# Patient Record
Sex: FEMALE | Race: WHITE | Employment: OTHER | URBAN - METROPOLITAN AREA
[De-identification: names, ages, dates, MRNs, and addresses within clinical notes are randomized per-mention and may not be internally consistent; named-entity substitution may affect disease eponyms.]

---

## 2019-03-06 ENCOUNTER — HOSPITAL ENCOUNTER (OUTPATIENT)
Age: 79
Discharge: HOME | End: 2019-03-08
Payer: MEDICARE

## 2019-03-06 ENCOUNTER — HOSPITAL ENCOUNTER (OUTPATIENT)
Dept: GENERAL RADIOLOGY | Age: 79
Discharge: HOME | End: 2019-03-08
Payer: MEDICARE

## 2019-03-06 DIAGNOSIS — I10 BENIGN HYPERTENSION: ICD-10-CM

## 2019-03-06 PROCEDURE — 71046 X-RAY EXAM CHEST 2 VIEWS: CPT

## 2019-06-12 ENCOUNTER — HOSPITAL ENCOUNTER (OUTPATIENT)
Dept: ULTRASOUND IMAGING | Age: 79
Discharge: HOME | End: 2019-06-12
Payer: MEDICARE

## 2019-06-12 ENCOUNTER — HOSPITAL ENCOUNTER (OUTPATIENT)
Age: 79
Discharge: HOME | End: 2019-06-14
Payer: MEDICARE

## 2019-06-12 ENCOUNTER — HOSPITAL ENCOUNTER (OUTPATIENT)
Dept: GENERAL RADIOLOGY | Age: 79
Discharge: HOME | End: 2019-06-14
Payer: MEDICARE

## 2019-06-12 DIAGNOSIS — R10.9 FLANK PAIN: ICD-10-CM

## 2019-06-12 DIAGNOSIS — R10.9 ABDOMINAL PAIN, UNSPECIFIED ABDOMINAL LOCATION: ICD-10-CM

## 2019-06-12 PROCEDURE — 74018 RADEX ABDOMEN 1 VIEW: CPT

## 2019-06-12 PROCEDURE — 76775 US EXAM ABDO BACK WALL LIM: CPT

## 2019-06-24 ENCOUNTER — HOSPITAL ENCOUNTER (OUTPATIENT)
Age: 79
Discharge: HOME | End: 2019-06-26

## 2019-06-24 LAB
ANION GAP SERPL CALCULATED.3IONS-SCNC: 14 MMOL/L (ref 7–16)
BUN BLDV-MCNC: 11 MG/DL (ref 8–23)
CALCIUM SERPL-MCNC: 9.4 MG/DL (ref 8.6–10.2)
CHLORIDE BLD-SCNC: 102 MMOL/L (ref 98–107)
CO2: 22 MMOL/L (ref 22–29)
CREAT SERPL-MCNC: 0.7 MG/DL (ref 0.5–1)
GFR AFRICAN AMERICAN: >60
GFR NON-AFRICAN AMERICAN: >60 ML/MIN/1.73
GLUCOSE BLD-MCNC: 94 MG/DL (ref 74–99)
HBA1C MFR BLD: 6.2 % (ref 4–5.6)
HCT VFR BLD CALC: 30.7 % (ref 34–48)
HEMOGLOBIN: 9.9 G/DL (ref 11.5–15.5)
MCH RBC QN AUTO: 30.7 PG (ref 26–35)
MCHC RBC AUTO-ENTMCNC: 32.2 % (ref 32–34.5)
MCV RBC AUTO: 95.3 FL (ref 80–99.9)
PDW BLD-RTO: 13.7 FL (ref 11.5–15)
PLATELET # BLD: 322 E9/L (ref 130–450)
PMV BLD AUTO: 9.9 FL (ref 7–12)
POTASSIUM SERPL-SCNC: 3.9 MMOL/L (ref 3.5–5)
RBC # BLD: 3.22 E12/L (ref 3.5–5.5)
SODIUM BLD-SCNC: 138 MMOL/L (ref 132–146)
WBC # BLD: 11.7 E9/L (ref 4.5–11.5)

## 2019-06-24 PROCEDURE — 85027 COMPLETE CBC AUTOMATED: CPT

## 2019-06-24 PROCEDURE — 36415 COLL VENOUS BLD VENIPUNCTURE: CPT

## 2019-06-24 PROCEDURE — 83036 HEMOGLOBIN GLYCOSYLATED A1C: CPT

## 2019-06-24 PROCEDURE — 80048 BASIC METABOLIC PNL TOTAL CA: CPT

## 2019-07-01 ENCOUNTER — CLINICAL DOCUMENTATION (OUTPATIENT)
Dept: FAMILY MEDICINE CLINIC | Age: 79
End: 2019-07-01

## 2019-07-01 ASSESSMENT — ENCOUNTER SYMPTOMS
BACK PAIN: 1
ABDOMINAL PAIN: 0
NAUSEA: 0
ALLERGIC/IMMUNOLOGIC NEGATIVE: 1
CONSTIPATION: 0
COUGH: 0
VOICE CHANGE: 0
STRIDOR: 0
COLOR CHANGE: 0
EYE ITCHING: 0
CHEST TIGHTNESS: 0
GASTROINTESTINAL NEGATIVE: 1
WHEEZING: 0
VOMITING: 0
PHOTOPHOBIA: 0
SHORTNESS OF BREATH: 0
SINUS PAIN: 0
RHINORRHEA: 0
BLOOD IN STOOL: 0
APNEA: 0
SORE THROAT: 0
DIARRHEA: 0
CHOKING: 0
ANAL BLEEDING: 0
ABDOMINAL DISTENTION: 0
EYE DISCHARGE: 0
FACIAL SWELLING: 0
RESPIRATORY NEGATIVE: 1
SINUS PRESSURE: 0
EYE REDNESS: 0
TROUBLE SWALLOWING: 0
EYE PAIN: 0
RECTAL PAIN: 0

## 2019-07-01 NOTE — PROGRESS NOTES
Lucy Vasquez is a 69 y.o. female. HPI/Chief C/O:  No chief complaint on file. Allergies   Allergen Reactions    Statins [Statins] Swelling and Rash     Muscle aches tongue swelled    Adhesive Tape      Causes reddness    Duricef [Cefadroxil]      Doesn't remember reaction    Gabapentin      Doesn't remember reaction    Oxaprozin      Possible rash    Sulfa Antibiotics Rash   I am in to admit this new patient  We will reconcile her past care planning with our treatment goals  She has no new issues today        ROS:  Review of Systems   Constitutional: Negative. Negative for activity change, appetite change, chills, diaphoresis, fatigue, fever and unexpected weight change. HENT: Negative. Negative for congestion, dental problem, drooling, ear discharge, ear pain, facial swelling, hearing loss, mouth sores, nosebleeds, postnasal drip, rhinorrhea, sinus pressure, sinus pain, sneezing, sore throat, tinnitus, trouble swallowing and voice change. Eyes: Negative for photophobia, pain, discharge, redness, itching and visual disturbance. Respiratory: Negative. Negative for apnea, cough, choking, chest tightness, shortness of breath, wheezing and stridor. Cardiovascular: Negative. Negative for chest pain, palpitations and leg swelling. Gastrointestinal: Negative. Negative for abdominal distention, abdominal pain, anal bleeding, blood in stool, constipation, diarrhea, nausea, rectal pain and vomiting. Endocrine: Negative. Negative for cold intolerance, heat intolerance, polydipsia, polyphagia and polyuria. Genitourinary: Negative. Negative for decreased urine volume, difficulty urinating, dysuria, enuresis, flank pain, frequency, genital sores, hematuria and urgency. Musculoskeletal: Positive for arthralgias, back pain, gait problem, joint swelling, myalgias and neck pain. Negative for neck stiffness. Skin: Negative. Negative for color change, pallor, rash and wound.

## 2019-07-02 ENCOUNTER — APPOINTMENT (OUTPATIENT)
Dept: GENERAL RADIOLOGY | Age: 79
DRG: 175 | End: 2019-07-02
Payer: MEDICARE

## 2019-07-02 ENCOUNTER — HOSPITAL ENCOUNTER (INPATIENT)
Age: 79
LOS: 4 days | Discharge: NURSING, REHAB OR LONG-TERM CARE | DRG: 175 | End: 2019-07-08
Attending: EMERGENCY MEDICINE | Admitting: INTERNAL MEDICINE
Payer: MEDICARE

## 2019-07-02 DIAGNOSIS — J18.9 HCAP (HEALTHCARE-ASSOCIATED PNEUMONIA): ICD-10-CM

## 2019-07-02 DIAGNOSIS — I26.99 OTHER ACUTE PULMONARY EMBOLISM WITHOUT ACUTE COR PULMONALE (HCC): ICD-10-CM

## 2019-07-02 DIAGNOSIS — R07.9 CHEST PAIN, UNSPECIFIED TYPE: Primary | ICD-10-CM

## 2019-07-02 PROBLEM — E78.5 HYPERLIPIDEMIA LDL GOAL <100: Chronic | Status: ACTIVE | Noted: 2019-07-02

## 2019-07-02 PROBLEM — I25.10 CAD (CORONARY ARTERY DISEASE), NATIVE CORONARY ARTERY: Chronic | Status: ACTIVE | Noted: 2019-07-02

## 2019-07-02 LAB
ALBUMIN SERPL-MCNC: 3.5 G/DL (ref 3.5–5.2)
ALP BLD-CCNC: 104 U/L (ref 35–104)
ALT SERPL-CCNC: 32 U/L (ref 0–32)
ANION GAP SERPL CALCULATED.3IONS-SCNC: 10 MMOL/L (ref 7–16)
AST SERPL-CCNC: 30 U/L (ref 0–31)
BASOPHILS ABSOLUTE: 0.03 E9/L (ref 0–0.2)
BASOPHILS RELATIVE PERCENT: 0.4 % (ref 0–2)
BILIRUB SERPL-MCNC: 0.5 MG/DL (ref 0–1.2)
BUN BLDV-MCNC: 9 MG/DL (ref 8–23)
CALCIUM SERPL-MCNC: 9.3 MG/DL (ref 8.6–10.2)
CHLORIDE BLD-SCNC: 104 MMOL/L (ref 98–107)
CO2: 26 MMOL/L (ref 22–29)
CREAT SERPL-MCNC: 0.7 MG/DL (ref 0.5–1)
EKG ATRIAL RATE: 95 BPM
EKG P AXIS: 72 DEGREES
EKG P-R INTERVAL: 226 MS
EKG Q-T INTERVAL: 564 MS
EKG QRS DURATION: 82 MS
EKG QTC CALCULATION (BAZETT): 708 MS
EKG R AXIS: 19 DEGREES
EKG T AXIS: 46 DEGREES
EKG VENTRICULAR RATE: 95 BPM
EOSINOPHILS ABSOLUTE: 0.11 E9/L (ref 0.05–0.5)
EOSINOPHILS RELATIVE PERCENT: 1.6 % (ref 0–6)
GFR AFRICAN AMERICAN: >60
GFR NON-AFRICAN AMERICAN: >60 ML/MIN/1.73
GLUCOSE BLD-MCNC: 115 MG/DL (ref 74–99)
HCT VFR BLD CALC: 34.6 % (ref 34–48)
HEMOGLOBIN: 10.9 G/DL (ref 11.5–15.5)
IMMATURE GRANULOCYTES #: 0.04 E9/L
IMMATURE GRANULOCYTES %: 0.6 % (ref 0–5)
LACTIC ACID: 1.1 MMOL/L (ref 0.5–2.2)
LYMPHOCYTES ABSOLUTE: 0.62 E9/L (ref 1.5–4)
LYMPHOCYTES RELATIVE PERCENT: 8.9 % (ref 20–42)
MCH RBC QN AUTO: 30.2 PG (ref 26–35)
MCHC RBC AUTO-ENTMCNC: 31.5 % (ref 32–34.5)
MCV RBC AUTO: 95.8 FL (ref 80–99.9)
MONOCYTES ABSOLUTE: 0.4 E9/L (ref 0.1–0.95)
MONOCYTES RELATIVE PERCENT: 5.7 % (ref 2–12)
NEUTROPHILS ABSOLUTE: 5.78 E9/L (ref 1.8–7.3)
NEUTROPHILS RELATIVE PERCENT: 82.8 % (ref 43–80)
PDW BLD-RTO: 13.5 FL (ref 11.5–15)
PLATELET # BLD: 405 E9/L (ref 130–450)
PMV BLD AUTO: 8.6 FL (ref 7–12)
POTASSIUM SERPL-SCNC: 4.6 MMOL/L (ref 3.5–5)
PRO-BNP: 1426 PG/ML (ref 0–450)
RBC # BLD: 3.61 E12/L (ref 3.5–5.5)
SODIUM BLD-SCNC: 140 MMOL/L (ref 132–146)
TOTAL PROTEIN: 6.8 G/DL (ref 6.4–8.3)
TROPONIN: <0.01 NG/ML (ref 0–0.03)
WBC # BLD: 7 E9/L (ref 4.5–11.5)

## 2019-07-02 PROCEDURE — 83880 ASSAY OF NATRIURETIC PEPTIDE: CPT

## 2019-07-02 PROCEDURE — 2580000003 HC RX 258: Performed by: PHYSICIAN ASSISTANT

## 2019-07-02 PROCEDURE — 84484 ASSAY OF TROPONIN QUANT: CPT

## 2019-07-02 PROCEDURE — 96366 THER/PROPH/DIAG IV INF ADDON: CPT

## 2019-07-02 PROCEDURE — G0378 HOSPITAL OBSERVATION PER HR: HCPCS

## 2019-07-02 PROCEDURE — 87040 BLOOD CULTURE FOR BACTERIA: CPT

## 2019-07-02 PROCEDURE — 6360000002 HC RX W HCPCS: Performed by: INTERNAL MEDICINE

## 2019-07-02 PROCEDURE — 80053 COMPREHEN METABOLIC PANEL: CPT

## 2019-07-02 PROCEDURE — 99285 EMERGENCY DEPT VISIT HI MDM: CPT

## 2019-07-02 PROCEDURE — 93010 ELECTROCARDIOGRAM REPORT: CPT | Performed by: INTERNAL MEDICINE

## 2019-07-02 PROCEDURE — 36415 COLL VENOUS BLD VENIPUNCTURE: CPT

## 2019-07-02 PROCEDURE — 85025 COMPLETE CBC W/AUTO DIFF WBC: CPT

## 2019-07-02 PROCEDURE — 93005 ELECTROCARDIOGRAM TRACING: CPT | Performed by: PHYSICIAN ASSISTANT

## 2019-07-02 PROCEDURE — 96372 THER/PROPH/DIAG INJ SC/IM: CPT

## 2019-07-02 PROCEDURE — 6360000002 HC RX W HCPCS: Performed by: PHYSICIAN ASSISTANT

## 2019-07-02 PROCEDURE — 71045 X-RAY EXAM CHEST 1 VIEW: CPT

## 2019-07-02 PROCEDURE — 2580000003 HC RX 258: Performed by: INTERNAL MEDICINE

## 2019-07-02 PROCEDURE — 96365 THER/PROPH/DIAG IV INF INIT: CPT

## 2019-07-02 PROCEDURE — 83605 ASSAY OF LACTIC ACID: CPT

## 2019-07-02 PROCEDURE — 6370000000 HC RX 637 (ALT 250 FOR IP): Performed by: INTERNAL MEDICINE

## 2019-07-02 RX ORDER — ASPIRIN 81 MG/1
81 TABLET, CHEWABLE ORAL DAILY
Status: DISCONTINUED | OUTPATIENT
Start: 2019-07-03 | End: 2019-07-02 | Stop reason: SDUPTHER

## 2019-07-02 RX ORDER — CETIRIZINE HYDROCHLORIDE 10 MG/1
5 TABLET ORAL DAILY
Status: DISCONTINUED | OUTPATIENT
Start: 2019-07-02 | End: 2019-07-08 | Stop reason: HOSPADM

## 2019-07-02 RX ORDER — SENNA PLUS 8.6 MG/1
2 TABLET ORAL DAILY
Status: DISCONTINUED | OUTPATIENT
Start: 2019-07-02 | End: 2019-07-08 | Stop reason: HOSPADM

## 2019-07-02 RX ORDER — SODIUM CHLORIDE 0.9 % (FLUSH) 0.9 %
10 SYRINGE (ML) INJECTION PRN
Status: DISCONTINUED | OUTPATIENT
Start: 2019-07-02 | End: 2019-07-08 | Stop reason: HOSPADM

## 2019-07-02 RX ORDER — PANTOPRAZOLE SODIUM 40 MG/1
40 TABLET, DELAYED RELEASE ORAL
Status: DISCONTINUED | OUTPATIENT
Start: 2019-07-03 | End: 2019-07-08 | Stop reason: HOSPADM

## 2019-07-02 RX ORDER — POLYETHYLENE GLYCOL 3350 17 G/17G
17 POWDER, FOR SOLUTION ORAL DAILY
Status: DISCONTINUED | OUTPATIENT
Start: 2019-07-02 | End: 2019-07-08 | Stop reason: HOSPADM

## 2019-07-02 RX ORDER — ASPIRIN 81 MG/1
81 TABLET, CHEWABLE ORAL DAILY
Status: DISCONTINUED | OUTPATIENT
Start: 2019-07-02 | End: 2019-07-08 | Stop reason: HOSPADM

## 2019-07-02 RX ORDER — GUAIFENESIN AND DEXTROMETHORPHAN HYDROBROMIDE 100; 10 MG/5ML; MG/5ML
10 SOLUTION ORAL EVERY 4 HOURS PRN
COMMUNITY
End: 2021-05-27

## 2019-07-02 RX ORDER — CETIRIZINE HYDROCHLORIDE 10 MG/1
5 TABLET ORAL DAILY
Status: DISCONTINUED | OUTPATIENT
Start: 2019-07-03 | End: 2019-07-02

## 2019-07-02 RX ORDER — ACETAMINOPHEN 325 MG/1
650 TABLET ORAL EVERY 4 HOURS PRN
COMMUNITY
End: 2020-09-22

## 2019-07-02 RX ORDER — SENNA PLUS 8.6 MG/1
2 TABLET ORAL DAILY
COMMUNITY
End: 2022-01-03

## 2019-07-02 RX ORDER — OXYCODONE HYDROCHLORIDE AND ACETAMINOPHEN 5; 325 MG/1; MG/1
1 TABLET ORAL EVERY 6 HOURS PRN
Status: ON HOLD | COMMUNITY
End: 2019-07-08 | Stop reason: SDUPTHER

## 2019-07-02 RX ORDER — NITROGLYCERIN 0.4 MG/1
0.4 TABLET SUBLINGUAL EVERY 5 MIN PRN
COMMUNITY

## 2019-07-02 RX ORDER — BISACODYL 10 MG
10 SUPPOSITORY, RECTAL RECTAL DAILY PRN
COMMUNITY
End: 2020-09-22

## 2019-07-02 RX ORDER — OXYCODONE HYDROCHLORIDE AND ACETAMINOPHEN 5; 325 MG/1; MG/1
1 TABLET ORAL EVERY 6 HOURS PRN
Status: DISCONTINUED | OUTPATIENT
Start: 2019-07-02 | End: 2019-07-08 | Stop reason: HOSPADM

## 2019-07-02 RX ORDER — ONDANSETRON 2 MG/ML
4 INJECTION INTRAMUSCULAR; INTRAVENOUS EVERY 6 HOURS PRN
Status: DISCONTINUED | OUTPATIENT
Start: 2019-07-02 | End: 2019-07-03

## 2019-07-02 RX ORDER — ACETAMINOPHEN 325 MG/1
650 TABLET ORAL EVERY 4 HOURS PRN
Status: ON HOLD | COMMUNITY
End: 2019-07-08 | Stop reason: HOSPADM

## 2019-07-02 RX ORDER — SODIUM CHLORIDE 0.9 % (FLUSH) 0.9 %
10 SYRINGE (ML) INJECTION EVERY 12 HOURS SCHEDULED
Status: DISCONTINUED | OUTPATIENT
Start: 2019-07-02 | End: 2019-07-08 | Stop reason: HOSPADM

## 2019-07-02 RX ORDER — POLYETHYLENE GLYCOL 3350 17 G/17G
17 POWDER, FOR SOLUTION ORAL DAILY
COMMUNITY
End: 2020-09-22

## 2019-07-02 RX ORDER — ACETAMINOPHEN 325 MG/1
650 TABLET ORAL EVERY 4 HOURS PRN
Status: DISCONTINUED | OUTPATIENT
Start: 2019-07-02 | End: 2019-07-08 | Stop reason: HOSPADM

## 2019-07-02 RX ADMIN — PIPERACILLIN SODIUM,TAZOBACTAM SODIUM 3.38 G: 3; .375 INJECTION, POWDER, FOR SOLUTION INTRAVENOUS at 13:15

## 2019-07-02 RX ADMIN — Medication 10 ML: at 21:51

## 2019-07-02 RX ADMIN — METOPROLOL TARTRATE 25 MG: 25 TABLET, FILM COATED ORAL at 16:07

## 2019-07-02 RX ADMIN — ENOXAPARIN SODIUM 40 MG: 40 INJECTION SUBCUTANEOUS at 16:07

## 2019-07-02 RX ADMIN — ASPIRIN 81 MG 81 MG: 81 TABLET ORAL at 16:07

## 2019-07-02 RX ADMIN — METOPROLOL TARTRATE 25 MG: 25 TABLET, FILM COATED ORAL at 21:40

## 2019-07-02 ASSESSMENT — PAIN SCALES - GENERAL: PAINLEVEL_OUTOF10: 0

## 2019-07-02 ASSESSMENT — PAIN - FUNCTIONAL ASSESSMENT: PAIN_FUNCTIONAL_ASSESSMENT: 0-10

## 2019-07-02 NOTE — ED PROVIDER NOTES
ED Attending  CC: Carrie       Department of Emergency Medicine   ED  Provider Note  Admit Date/RoomTime: 7/2/2019  8:07 AM  ED Room: 14B/14B-14  MRN: [id number]  Chief Complaint:   Chest Pain and Shortness of Breath       History of Present Illness   Source of history provided by:  patient. History/Exam Limitations: none. Cathie Zambrano is a 69 y.o. female who has a past medical history of:   Past Medical History:   Diagnosis Date    Arthritis     shoulders, hips and knees, fingers    Bronchitis     CAD (coronary artery disease)     some blockage, being treated with aspirin    Diabetes mellitus ([facility] [address]     borderlline    GERD (gastroesophageal reflux disease)     Hyperlipidemia     Hypertension     Sinus disorder     presents to the ED by ambulance for chest pain and shortness of breath, beginning at 0300 and are unchanged since that time. The complaint has been persistent, moderate in severity. States she believes she had an allergic reaction. Denies any new foods, fragrances, lotions or detergents. Did take robitussin for sinus issues. States she believes she has taken before without reaction. At that time she developed chest pressure and shortness of breath. Patient is 2 weeks post quadruple bypass by Dr. Ledy Landrum at [place]. States she has worn oxygen intermittently since her surgery. Denies any fever, chills, nausea, vomiting, tongue or lip swelling, difficulty swallowing. Given benadryl pta with improvement. ROS   Pertinent positives and negatives are stated within HPI, all other systems reviewed and are negative. Past Surgical History:   Procedure Laterality Date    APPENDECTOMY      BLADDER SUSPENSION      CARDIAC SURGERY      CHOLECYSTECTOMY      COLONOSCOPY  11/29/12    diverticulosis, hemorrhoid    COLONOSCOPY  september 2015    CYST REMOVAL      back    DILATION AND CURETTAGE OF UTERUS      HYSTERECTOMY     Social History:  reports that she has quit smoking.  She has never 32.0 - 34.5 %    RDW 13.5 11.5 - 15.0 fL    Platelets 047 939 - 567 E9/L    MPV 8.6 7.0 - 12.0 fL    Neutrophils % 82.8 (H) 43.0 - 80.0 %    Immature Granulocytes % 0.6 0.0 - 5.0 %    Lymphocytes % 8.9 (L) 20.0 - 42.0 %    Monocytes % 5.7 2.0 - 12.0 %    Eosinophils % 1.6 0.0 - 6.0 %    Basophils % 0.4 0.0 - 2.0 %    Neutrophils # 5.78 1.80 - 7.30 E9/L    Immature Granulocytes # 0.04 E9/L    Lymphocytes # 0.62 (L) 1.50 - 4.00 E9/L    Monocytes # 0.40 0.10 - 0.95 E9/L    Eosinophils # 0.11 0.05 - 0.50 E9/L    Basophils # 0.03 0.00 - 0.20 E9/L   Comprehensive Metabolic Panel   Result Value Ref Range    Sodium 140 132 - 146 mmol/L    Potassium 4.6 3.5 - 5.0 mmol/L    Chloride 104 98 - 107 mmol/L    CO2 26 22 - 29 mmol/L    Anion Gap 10 7 - 16 mmol/L    Glucose 115 (H) 74 - 99 mg/dL    BUN 9 8 - 23 mg/dL    CREATININE 0.7 0.5 - 1.0 mg/dL    GFR Non-African American >60 >=60 mL/min/1.73    GFR African American >60     Calcium 9.3 8.6 - 10.2 mg/dL    Total Protein 6.8 6.4 - 8.3 g/dL    Alb 3.5 3.5 - 5.2 g/dL    Total Bilirubin 0.5 0.0 - 1.2 mg/dL    Alkaline Phosphatase 104 35 - 104 U/L    ALT 32 0 - 32 U/L    AST 30 0 - 31 U/L   Troponin   Result Value Ref Range    Troponin <0.01 0.00 - 0.03 ng/mL   Brain Natriuretic Peptide   Result Value Ref Range    Pro-BNP 1,426 (H) 0 - 450 pg/mL   Lactic Acid, Plasma   Result Value Ref Range    Lactic Acid 1.1 0.5 - 2.2 mmol/L   EKG 12 Lead   Result Value Ref Range    Ventricular Rate 95 BPM    Atrial Rate 95 BPM    P-R Interval 226 ms    QRS Duration 82 ms    Q-T Interval 564 ms    QTc Calculation (Bazett) 708 ms    P Axis 72 degrees    R Axis 19 degrees    T Axis 46 degrees     Imaging: All Radiology results interpreted by Radiologist unless otherwise noted. XR CHEST PORTABLE   Final Result   1. Stable, enlarged cardiomediastinal silhouette with thoracic aortic   vascular calcifications.    2. Bibasilar airspace disease, nonspecific finding, there is since   previous exam, left

## 2019-07-02 NOTE — PROGRESS NOTES
Call received from [facility] in [facility], patient stress test tomorrow  (7/3/19) at 1400 with Dr. Afsaneh Sorensen

## 2019-07-03 PROBLEM — E11.9 DIABETES MELLITUS (HCC): Status: ACTIVE | Noted: 2019-07-03

## 2019-07-03 PROBLEM — I10 HYPERTENSION: Status: ACTIVE | Noted: 2019-07-03

## 2019-07-03 PROBLEM — K21.9 GERD (GASTROESOPHAGEAL REFLUX DISEASE): Status: ACTIVE | Noted: 2019-07-03

## 2019-07-03 LAB
C-REACTIVE PROTEIN: 4.1 MG/DL (ref 0–0.4)
CHOLESTEROL, TOTAL: 155 MG/DL (ref 0–199)
D DIMER: 4229 NG/ML DDU
HDLC SERPL-MCNC: 26 MG/DL
LDL CHOLESTEROL CALCULATED: 102 MG/DL (ref 0–99)
LV EF: 67 %
LVEF MODALITY: NORMAL
SEDIMENTATION RATE, ERYTHROCYTE: 55 MM/HR (ref 0–20)
TRIGL SERPL-MCNC: 137 MG/DL (ref 0–149)
VLDLC SERPL CALC-MCNC: 27 MG/DL

## 2019-07-03 PROCEDURE — 97161 PT EVAL LOW COMPLEX 20 MIN: CPT

## 2019-07-03 PROCEDURE — G0378 HOSPITAL OBSERVATION PER HR: HCPCS

## 2019-07-03 PROCEDURE — 80061 LIPID PANEL: CPT

## 2019-07-03 PROCEDURE — 2580000003 HC RX 258: Performed by: INTERNAL MEDICINE

## 2019-07-03 PROCEDURE — 96372 THER/PROPH/DIAG INJ SC/IM: CPT

## 2019-07-03 PROCEDURE — 85378 FIBRIN DEGRADE SEMIQUANT: CPT

## 2019-07-03 PROCEDURE — 6370000000 HC RX 637 (ALT 250 FOR IP): Performed by: INTERNAL MEDICINE

## 2019-07-03 PROCEDURE — 6360000002 HC RX W HCPCS: Performed by: INTERNAL MEDICINE

## 2019-07-03 PROCEDURE — 36415 COLL VENOUS BLD VENIPUNCTURE: CPT

## 2019-07-03 PROCEDURE — 96366 THER/PROPH/DIAG IV INF ADDON: CPT

## 2019-07-03 PROCEDURE — 93306 TTE W/DOPPLER COMPLETE: CPT

## 2019-07-03 PROCEDURE — 96367 TX/PROPH/DG ADDL SEQ IV INF: CPT

## 2019-07-03 PROCEDURE — 87040 BLOOD CULTURE FOR BACTERIA: CPT

## 2019-07-03 PROCEDURE — 85651 RBC SED RATE NONAUTOMATED: CPT

## 2019-07-03 PROCEDURE — 97530 THERAPEUTIC ACTIVITIES: CPT

## 2019-07-03 PROCEDURE — 97165 OT EVAL LOW COMPLEX 30 MIN: CPT

## 2019-07-03 PROCEDURE — 97535 SELF CARE MNGMENT TRAINING: CPT

## 2019-07-03 PROCEDURE — 86140 C-REACTIVE PROTEIN: CPT

## 2019-07-03 RX ORDER — COLCHICINE 0.6 MG/1
0.6 TABLET ORAL 2 TIMES DAILY
Status: DISCONTINUED | OUTPATIENT
Start: 2019-07-03 | End: 2019-07-08 | Stop reason: HOSPADM

## 2019-07-03 RX ORDER — GUAIFENESIN 400 MG/1
400 TABLET ORAL 3 TIMES DAILY
Status: DISCONTINUED | OUTPATIENT
Start: 2019-07-03 | End: 2019-07-08 | Stop reason: HOSPADM

## 2019-07-03 RX ADMIN — METOPROLOL TARTRATE 25 MG: 25 TABLET, FILM COATED ORAL at 22:00

## 2019-07-03 RX ADMIN — METOPROLOL TARTRATE 25 MG: 25 TABLET, FILM COATED ORAL at 08:08

## 2019-07-03 RX ADMIN — VANCOMYCIN HYDROCHLORIDE 1.5 G: 10 INJECTION, POWDER, LYOPHILIZED, FOR SOLUTION INTRAVENOUS at 22:00

## 2019-07-03 RX ADMIN — SENNOSIDES 17.2 MG: 8.6 TABLET, FILM COATED ORAL at 08:08

## 2019-07-03 RX ADMIN — POLYETHYLENE GLYCOL 3350 17 G: 17 POWDER, FOR SOLUTION ORAL at 08:08

## 2019-07-03 RX ADMIN — ASPIRIN 81 MG 81 MG: 81 TABLET ORAL at 08:08

## 2019-07-03 RX ADMIN — Medication 10 ML: at 22:00

## 2019-07-03 RX ADMIN — Medication 10 ML: at 08:09

## 2019-07-03 RX ADMIN — COLCHICINE 0.6 MG: 0.6 TABLET, FILM COATED ORAL at 22:00

## 2019-07-03 RX ADMIN — GUAIFENESIN 400 MG: 400 TABLET ORAL at 22:00

## 2019-07-03 RX ADMIN — CETIRIZINE HYDROCHLORIDE 5 MG: 10 TABLET, FILM COATED ORAL at 08:08

## 2019-07-03 RX ADMIN — ENOXAPARIN SODIUM 40 MG: 40 INJECTION SUBCUTANEOUS at 08:08

## 2019-07-03 ASSESSMENT — PAIN SCALES - GENERAL
PAINLEVEL_OUTOF10: 0

## 2019-07-03 NOTE — H&P
[address] [facility]  History and Physical      CHIEF COMPLAINT: Pain      HISTORY OF PRESENT ILLNESS:      The patient is a 69 y.o. female patient of dr wilson who presents with chest pain. History of CAD diabetes GERD with chest pain and shortness of breath. Pain started at 3:00 yesterday and lasted for minutes associated with shortness of breath. Occurred at rest.  Associated with diaphoresis. He also notes a reddish splotchy rash at the time. Denies exacerbation relief. She had CABG 2 weeks ago. She denies any new medications.     Past Medical History:    Past Medical History:   Diagnosis Date    Arthritis     shoulders, hips and knees, fingers    Bronchitis     CAD (coronary artery disease)     some blockage, being treated with aspirin    Diabetes mellitus (HCC)     borderlline    GERD (gastroesophageal reflux disease)     Hyperlipidemia     Hypertension     Sinus disorder        Past Surgical History:    Past Surgical History:   Procedure Laterality Date    APPENDECTOMY      BLADDER SUSPENSION      CARDIAC SURGERY      CHOLECYSTECTOMY      COLONOSCOPY  11/29/12    diverticulosis, hemorrhoid    COLONOSCOPY  september 2015    CYST REMOVAL      back    DILATION AND CURETTAGE OF UTERUS      HYSTERECTOMY         Medications Prior to Admission:    Medications Prior to Admission: acetaminophen (TYLENOL) 325 MG tablet, Take 650 mg by mouth every 4 hours as needed for Pain  acetaminophen (TYLENOL) 325 MG tablet, Take 650 mg by mouth every 4 hours as needed for Pain or Fever  bisacodyl (DULCOLAX) 10 MG suppository, Place 10 mg rectally daily as needed for Constipation  polyethylene glycol (GLYCOLAX) powder, Take 17 g by mouth daily  senna (SENOKOT) 8.6 MG tablet, Take 2 tablets by mouth daily  magnesium hydroxide (MILK OF MAGNESIA) 400 MG/5ML suspension, Take 30 mLs by mouth daily as needed for Constipation  nitroGLYCERIN (NITROSTAT) 0.4 MG SL tablet, Place 0.4 mg under the tongue every 5 minutes as needed for Chest pain up to max of 3 total doses. If no relief after 1 dose, call 911. oxyCODONE-acetaminophen (PERCOCET) 5-325 MG per tablet, Take 1 tablet by mouth every 6 hours as needed for Pain. Dextromethorphan-guaiFENesin  MG/5ML SYRP, Take 10 mLs by mouth every 4 hours as needed for Cough  metoprolol tartrate (LOPRESSOR) 25 MG tablet, Take 25 mg by mouth 2 times daily  aspirin 81 MG tablet, Take 81 mg by mouth daily Last dose 9/4 2015  omeprazole (PRILOSEC) 10 MG capsule, Take 20 mg by mouth daily   [DISCONTINUED] metoprolol (TOPROL-XL) 25 MG XL tablet, Take 25 mg by mouth 2 times daily     Allergies:    Statins [statins]; Adhesive tape; Duricef [cefadroxil]; Gabapentin; Oxaprozin; and Sulfa antibiotics    Social History:    reports that she has quit smoking. She has never used smokeless tobacco. She reports that she does not drink alcohol or use drugs. Family History:   pt denies contributory history     REVIEW OF SYSTEMS:  As above in the HPI, otherwise negative    PHYSICAL EXAM:    Vitals:  BP (!) 123/58   Pulse 81   Temp 98.5 °F (36.9 °C) (Temporal)   Resp 18   Ht 5' 3\" (1.6 m)   Wt 220 lb (99.8 kg)   SpO2 95%   BMI 38.97 kg/m²     General:  Awake, alert, oriented X 3. Well developed, well nourished, well groomed. No apparent distress. HEENT:  Normocephalic, atraumatic. Pupils equal, round, reactive to light. No scleral icterus. No conjunctival injection. Normal lips, teeth, and gums. No nasal discharge. Neck:  Supple  Heart:  RRR, no murmurs, gallops, rubs  Lungs:  CTA bilaterally, bilat symmetrical expansion, no wheeze, rales, or rhonchi  Abdomen:   Bowel sounds present, soft, nontender, no masses, no organomegaly, no peritoneal signs  Extremities:  No clubbing, cyanosis, or edema  Skin:  Warm and dry, no open lesions or rash  Neuro:  Cranial nerves 2-12 intact, no focal deficits  Breast: deferred  Rectal: deferred  Genitalia:  deferred    LABS:    CBC with

## 2019-07-03 NOTE — PROGRESS NOTES
Visual/  Perceptual Glasses: yes                    Hand dominance: right      Strength ROM Additional Info:    RUE  Wfl, formal MMT deferred due to sternal precautions  wfl (within sternal precautions) good  and wfl FMC/dexterity noted during ADL tasks     LUE Wfl, formal MMT deferred due to sternal precautions  wfl (within sternal precautions) good  and wfl FMC/dexterity noted during ADL tasks     Sensation:wfl  Tone: wfl  Edema:none noted                              Comments/Treatment: Upon arrival, patient supine in bed and agreeable to OT Session with PT collaboration. Therapist educated pt on role of OT and sternal precautions. Therapist facilitated bed mobility (log roll technique), functional transfers (various surfaces; bed, chair and toilet), standing tolerance tasks (addressing posture, balance and activity tolerance) and functional ambulation task in room and hallway (with and without w/w: cuing on sternal precautions within w/w management; light steadying assist allowed) - skilled cuing on sternal precautions, use of sternal pillow, body mechanics and safety. Therapist facilitated self-care retraining: education regarding UB/LB self-care tasks (modified techniques and limitations with precautions), toileting task and standing grooming task at sink educating pt on modified techniques, posture, safety and energy conservation techniques. Skilled monitoring of HR, O2 sats and pts response to treatment - cuing on pursed lip breathing. Pt demonstrating fair understanding of education/techniques, requiring additional training / education. At end of session, patient seated in chair with call light and phone within reach. Pt would benefit from continued skilled OT to increase functional independence and quality of life.     Eval Complexity: Low    (Evaluation time includes thorough review of current medical information, gathering information on past medical history/social history and prior level of function, completion of standardized testing/informal observation of tasks, assessment of data, and development of POC/Goals.)      Assessment of current deficits   Functional mobility [x]  ADLs [x] Strength [x]  Cognition []  Functional transfers  [x] IADLs [x] Safety Awareness [x]  Endurance [x]  Fine Motor Coordination [] Balance [x] Vision/perception [] Sensation []   Gross Motor Coordination [] ROM [] Delirium []                  Motor Control []    Plan of Care:   ADL retraining [x]   Equipment needs [x]   Neuromuscular re-education [x] Energy Conservation Techniques [x]  Functional Transfer training [x] Patient and/or Family Education [x]  Functional Mobility training [x]  Environmental Modifications [x]  Cognitive re-training []   Compensatory techniques for ADLs [x]  Splinting Needs []   Positioning to improve overall function [x]   Therapeutic Activity [x]  Therapeutic Exercise  [x]  Visual/Perceptual: []    Delirium prevention/treatment  []   Other:  []    Rehab Potential: Good for established goals     Patient / Family Goal:  Not stated     Patient and/or family were instructed diagnosis, prognosis/goals and plan of care. Demonstrated fair understanding. [] Malnutrition indicators have been identified and nursing has been notified to ensure a dietitian consult is ordered.        AM-PAC Daily Activity Inpatient   How much help for putting on and taking off regular lower body clothing?: A Lot  How much help for Bathing?: A Lot  How much help for Toileting?: A Little  How much help for putting on and taking off regular upper body clothing?: A Little  How much help for taking care of personal grooming?: A Little  How much help for eating meals?: None  AM-[facility] Inpatient Daily Activity Raw Score: 17  AM-PAC Inpatient ADL T-Scale Score : 37.26  ADL Inpatient CMS 0-100% Score: 50.11  ADL Inpatient CMS G-Code Modifier : CK       low Evaluation + 12 timed treatment minutes  Tx Time in: 1108  Tx Time out: [address] OTR/L #[id number]

## 2019-07-03 NOTE — CONSULTS
CARDIOLOGY CONSULTATION    Patient Name:  Eden Neves    :  1940    Reason for Consultation:   Chest discomfort; shortness of breath    History of Present Illness:   Eden Neves presents to [facility] following the sudden onset of pressure-like discomfort in the right chest as well as dyspnea yesterday shortly prior to and following her taking a shower. Additionally she noted profound erythematous change in the color of her skin on her body. The past she has had rashes associated with statin drugs and does not take any presently secondary to either rash or severe myalgia. Her discomfort is not specifically related to exertion she does feel dyspneic after she exerts. He has no hemoptysis. She notes she felt similar to this she has her sinuses \"acting up\". Mucinex seems to help this. Past Medical History:   has a past medical history of Arthritis, Bronchitis, CAD (coronary artery disease), Diabetes mellitus ([facility] [address] GERD (gastroesophageal reflux disease), Hyperlipidemia, Hypertension, and Sinus disorder. Surgical History:   has a past surgical history that includes Hysterectomy; Appendectomy; Dilation and curettage of uterus; bladder suspension; Cholecystectomy; Colonoscopy (12); cyst removal; Colonoscopy (2015); and Cardiac surgery. Social History:   reports that she has quit smoking. She has never used smokeless tobacco. She reports that she does not drink alcohol or use drugs. Family History:  family history is remarkable for mother  age 54 carcinoma of the lung; father  age 61 myocardial infarction; brother  age 67 heart disease. Medications:  Prior to Admission medications    Medication Sig Start Date End Date Taking?  Authorizing Provider   acetaminophen (TYLENOL) 325 MG tablet Take 650 mg by mouth every 4 hours as needed for Pain   Yes Historical Provider, MD   acetaminophen (TYLENOL) 325 MG tablet Take 650 mg by mouth every 4 hours as needed for Pain or Fever   Yes Historical Provider, MD   bisacodyl (DULCOLAX) 10 MG suppository Place 10 mg rectally daily as needed for Constipation   Yes Historical Provider, MD   polyethylene glycol (GLYCOLAX) powder Take 17 g by mouth daily   Yes Historical Provider, MD   senna (SENOKOT) 8.6 MG tablet Take 2 tablets by mouth daily   Yes Historical Provider, MD   magnesium hydroxide (MILK OF MAGNESIA) 400 MG/5ML suspension Take 30 mLs by mouth daily as needed for Constipation   Yes Historical Provider, MD   nitroGLYCERIN (NITROSTAT) 0.4 MG SL tablet Place 0.4 mg under the tongue every 5 minutes as needed for Chest pain up to max of 3 total doses. If no relief after 1 dose, call 911. Yes Historical Provider, MD   oxyCODONE-acetaminophen (PERCOCET) 5-325 MG per tablet Take 1 tablet by mouth every 6 hours as needed for Pain. Yes Historical Provider, MD   Dextromethorphan-guaiFENesin  MG/5ML SYRP Take 10 mLs by mouth every 4 hours as needed for Cough   Yes Historical Provider, MD   metoprolol tartrate (LOPRESSOR) 25 MG tablet Take 25 mg by mouth 2 times daily   Yes Historical Provider, MD   aspirin 81 MG tablet Take 81 mg by mouth daily Last dose 9/4 2015   Yes Historical Provider, MD   omeprazole (PRILOSEC) 10 MG capsule Take 20 mg by mouth daily    Yes Historical Provider, MD       Allergies:  Statins [statins]; Adhesive tape; Duricef [cefadroxil]; Gabapentin; Oxaprozin; and Sulfa antibiotics     Review of Systems:   · Constitutional: there has been no unanticipated weight loss. There's been no significant change in energy level, sleep pattern or activity level. No fever chills or rigors. · Eyes: No visual changes or diplopia. No scleral icterus. · ENT: No Headaches, hearing loss or vertigo. No mouth sores or sore throat. No change in taste or smell.   · Cardiovascular: + Right-sided chest discomfort, dyspnea on exertion, denies palpitations, loss of consciousness, no

## 2019-07-04 ENCOUNTER — APPOINTMENT (OUTPATIENT)
Dept: CT IMAGING | Age: 79
DRG: 175 | End: 2019-07-04
Payer: MEDICARE

## 2019-07-04 PROBLEM — I26.99 PULMONARY EMBOLISM (HCC): Status: ACTIVE | Noted: 2019-07-04

## 2019-07-04 PROBLEM — I50.31 DIASTOLIC CHF, ACUTE (HCC): Status: ACTIVE | Noted: 2019-07-04

## 2019-07-04 PROCEDURE — 2700000000 HC OXYGEN THERAPY PER DAY

## 2019-07-04 PROCEDURE — 6370000000 HC RX 637 (ALT 250 FOR IP): Performed by: INTERNAL MEDICINE

## 2019-07-04 PROCEDURE — 6360000004 HC RX CONTRAST MEDICATION: Performed by: RADIOLOGY

## 2019-07-04 PROCEDURE — 6360000002 HC RX W HCPCS: Performed by: INTERNAL MEDICINE

## 2019-07-04 PROCEDURE — 2140000000 HC CCU INTERMEDIATE R&B

## 2019-07-04 PROCEDURE — 2580000003 HC RX 258: Performed by: RADIOLOGY

## 2019-07-04 PROCEDURE — 71275 CT ANGIOGRAPHY CHEST: CPT

## 2019-07-04 PROCEDURE — 2580000003 HC RX 258: Performed by: INTERNAL MEDICINE

## 2019-07-04 RX ORDER — SODIUM CHLORIDE 0.9 % (FLUSH) 0.9 %
10 SYRINGE (ML) INJECTION ONCE
Status: COMPLETED | OUTPATIENT
Start: 2019-07-04 | End: 2019-07-04

## 2019-07-04 RX ORDER — FUROSEMIDE 10 MG/ML
20 INJECTION INTRAMUSCULAR; INTRAVENOUS 2 TIMES DAILY
Status: DISCONTINUED | OUTPATIENT
Start: 2019-07-04 | End: 2019-07-08 | Stop reason: HOSPADM

## 2019-07-04 RX ADMIN — CETIRIZINE HYDROCHLORIDE 5 MG: 10 TABLET, FILM COATED ORAL at 09:06

## 2019-07-04 RX ADMIN — FUROSEMIDE 20 MG: 10 INJECTION, SOLUTION INTRAMUSCULAR; INTRAVENOUS at 12:36

## 2019-07-04 RX ADMIN — APIXABAN 10 MG: 5 TABLET, FILM COATED ORAL at 19:52

## 2019-07-04 RX ADMIN — IOPAMIDOL 75 ML: 755 INJECTION, SOLUTION INTRAVENOUS at 12:03

## 2019-07-04 RX ADMIN — ENOXAPARIN SODIUM 40 MG: 40 INJECTION SUBCUTANEOUS at 09:08

## 2019-07-04 RX ADMIN — METOPROLOL TARTRATE 25 MG: 25 TABLET, FILM COATED ORAL at 09:02

## 2019-07-04 RX ADMIN — FUROSEMIDE 20 MG: 10 INJECTION, SOLUTION INTRAMUSCULAR; INTRAVENOUS at 17:50

## 2019-07-04 RX ADMIN — COLCHICINE 0.6 MG: 0.6 TABLET, FILM COATED ORAL at 09:04

## 2019-07-04 RX ADMIN — GUAIFENESIN 400 MG: 400 TABLET ORAL at 23:19

## 2019-07-04 RX ADMIN — Medication 10 ML: at 12:04

## 2019-07-04 RX ADMIN — COLCHICINE 0.6 MG: 0.6 TABLET, FILM COATED ORAL at 23:18

## 2019-07-04 RX ADMIN — PANTOPRAZOLE SODIUM 40 MG: 40 TABLET, DELAYED RELEASE ORAL at 06:11

## 2019-07-04 RX ADMIN — ASPIRIN 81 MG 81 MG: 81 TABLET ORAL at 09:05

## 2019-07-04 RX ADMIN — GUAIFENESIN 400 MG: 400 TABLET ORAL at 09:02

## 2019-07-04 RX ADMIN — ACETAMINOPHEN 650 MG: 325 TABLET, FILM COATED ORAL at 19:52

## 2019-07-04 RX ADMIN — Medication 10 ML: at 09:08

## 2019-07-04 RX ADMIN — METOPROLOL TARTRATE 25 MG: 25 TABLET, FILM COATED ORAL at 19:52

## 2019-07-04 ASSESSMENT — PAIN SCALES - GENERAL
PAINLEVEL_OUTOF10: 4
PAINLEVEL_OUTOF10: 0
PAINLEVEL_OUTOF10: 0
PAINLEVEL_OUTOF10: 10
PAINLEVEL_OUTOF10: 0
PAINLEVEL_OUTOF10: 0
PAINLEVEL_OUTOF10: 4
PAINLEVEL_OUTOF10: 0

## 2019-07-04 NOTE — PROGRESS NOTES
anterior ascending aorta that are each occluded approximately 1 cm from the aorta. These results were communicated to the nurse taking care of the patient at 1428 hours on 2019. Result Date: 2019  Patient MRN:  [id number] : 1940 Age: 69 years Gender: Female Order Date:  2019 11:00 AM EXAM: CTA CHEST W CONTRAST NUMBER OF IMAGES:  449 INDICATION: Chest pain COMPARISON: None TECHNIQUE: Contiguous spiral images were obtained in the axial plane, following the administration of intravenous contrast using CT angiographic protocol. Sagittal and coronal images were reconstructed from the axial plane acquisition. Addition MIP reconstructions were presented to aid in the interpretation of this study. A total of 75 mL of Isovue-370 contrast was utilized. Low-dose CT  acquisition technique included one of following options; 1 . Automated exposure control, 2. Adjustment of MA and or KV according to patient's size or 3. Use of iterative reconstruction. FINDINGS: LUNGS: Multiple segmental and sulci are segmental pulmonary emboli are identified within the right lower lobe. The left lower lobe is partially collapsed. A moderate left-sided pleural effusion is present. A 0.6 cm solid nodule is identified within the right upper lobe. HEART: A trace pericardial effusion is present. AORTA: Unremarkable. MEDIASTINUM: Unremarkable. UPPER ABDOMEN: The gallbladder is surgically absent. OTHER: None. 1. Segmental and subsegmental pulmonary emboli within the right lower lobe. 2. Moderate left-sided pleural effusion. 3. Solid right upper lobe pulmonary nodule for which a 6-12 month follow-up chest CT without contrast is recommended per the Fleischner Society guidelines listed below. ALERT:  THIS IS AN ABNORMAL REPORT The findings were discussed by Dr. Breana Valenzuela with the ordering provider, Dr. Eriberto Beckett, at 1330 hours on 2019.  Guidelines for Management of Incidental Pulmonary Nodules Detected on CT

## 2019-07-05 LAB
ANION GAP SERPL CALCULATED.3IONS-SCNC: 13 MMOL/L (ref 7–16)
BUN BLDV-MCNC: 11 MG/DL (ref 8–23)
CALCIUM SERPL-MCNC: 9.5 MG/DL (ref 8.6–10.2)
CHLORIDE BLD-SCNC: 100 MMOL/L (ref 98–107)
CO2: 26 MMOL/L (ref 22–29)
CREAT SERPL-MCNC: 0.7 MG/DL (ref 0.5–1)
GFR AFRICAN AMERICAN: >60
GFR NON-AFRICAN AMERICAN: >60 ML/MIN/1.73
GLUCOSE BLD-MCNC: 116 MG/DL (ref 74–99)
HCT VFR BLD CALC: 34 % (ref 34–48)
HEMOGLOBIN: 10.8 G/DL (ref 11.5–15.5)
MCH RBC QN AUTO: 30 PG (ref 26–35)
MCHC RBC AUTO-ENTMCNC: 31.8 % (ref 32–34.5)
MCV RBC AUTO: 94.4 FL (ref 80–99.9)
PDW BLD-RTO: 14.1 FL (ref 11.5–15)
PLATELET # BLD: 354 E9/L (ref 130–450)
PMV BLD AUTO: 8.9 FL (ref 7–12)
POTASSIUM SERPL-SCNC: 3.7 MMOL/L (ref 3.5–5)
RBC # BLD: 3.6 E12/L (ref 3.5–5.5)
SODIUM BLD-SCNC: 139 MMOL/L (ref 132–146)
WBC # BLD: 6.6 E9/L (ref 4.5–11.5)

## 2019-07-05 PROCEDURE — 97535 SELF CARE MNGMENT TRAINING: CPT

## 2019-07-05 PROCEDURE — 2580000003 HC RX 258: Performed by: INTERNAL MEDICINE

## 2019-07-05 PROCEDURE — 85027 COMPLETE CBC AUTOMATED: CPT

## 2019-07-05 PROCEDURE — 6360000002 HC RX W HCPCS: Performed by: INTERNAL MEDICINE

## 2019-07-05 PROCEDURE — 6370000000 HC RX 637 (ALT 250 FOR IP): Performed by: INTERNAL MEDICINE

## 2019-07-05 PROCEDURE — 2700000000 HC OXYGEN THERAPY PER DAY

## 2019-07-05 PROCEDURE — 36415 COLL VENOUS BLD VENIPUNCTURE: CPT

## 2019-07-05 PROCEDURE — 2140000000 HC CCU INTERMEDIATE R&B

## 2019-07-05 PROCEDURE — 80048 BASIC METABOLIC PNL TOTAL CA: CPT

## 2019-07-05 PROCEDURE — 97530 THERAPEUTIC ACTIVITIES: CPT

## 2019-07-05 RX ADMIN — ACETAMINOPHEN 650 MG: 325 TABLET, FILM COATED ORAL at 21:07

## 2019-07-05 RX ADMIN — CETIRIZINE HYDROCHLORIDE 5 MG: 10 TABLET, FILM COATED ORAL at 09:50

## 2019-07-05 RX ADMIN — FUROSEMIDE 20 MG: 10 INJECTION, SOLUTION INTRAMUSCULAR; INTRAVENOUS at 19:10

## 2019-07-05 RX ADMIN — METOPROLOL TARTRATE 25 MG: 25 TABLET, FILM COATED ORAL at 21:08

## 2019-07-05 RX ADMIN — Medication 10 ML: at 09:50

## 2019-07-05 RX ADMIN — TRIMETHOBENZAMIDE HYDROCHLORIDE 200 MG: 100 INJECTION INTRAMUSCULAR at 19:09

## 2019-07-05 RX ADMIN — GUAIFENESIN 400 MG: 400 TABLET ORAL at 21:07

## 2019-07-05 RX ADMIN — APIXABAN 10 MG: 5 TABLET, FILM COATED ORAL at 21:07

## 2019-07-05 RX ADMIN — ASPIRIN 81 MG 81 MG: 81 TABLET ORAL at 09:49

## 2019-07-05 RX ADMIN — COLCHICINE 0.6 MG: 0.6 TABLET, FILM COATED ORAL at 09:49

## 2019-07-05 RX ADMIN — APIXABAN 10 MG: 5 TABLET, FILM COATED ORAL at 09:49

## 2019-07-05 RX ADMIN — METOPROLOL TARTRATE 25 MG: 25 TABLET, FILM COATED ORAL at 09:49

## 2019-07-05 RX ADMIN — GUAIFENESIN 400 MG: 400 TABLET ORAL at 09:49

## 2019-07-05 RX ADMIN — PANTOPRAZOLE SODIUM 40 MG: 40 TABLET, DELAYED RELEASE ORAL at 05:06

## 2019-07-05 RX ADMIN — Medication 10 ML: at 19:10

## 2019-07-05 RX ADMIN — COLCHICINE 0.6 MG: 0.6 TABLET, FILM COATED ORAL at 21:07

## 2019-07-05 RX ADMIN — ACETAMINOPHEN 650 MG: 325 TABLET, FILM COATED ORAL at 09:51

## 2019-07-05 RX ADMIN — GUAIFENESIN 400 MG: 400 TABLET ORAL at 15:12

## 2019-07-05 RX ADMIN — FUROSEMIDE 20 MG: 10 INJECTION, SOLUTION INTRAMUSCULAR; INTRAVENOUS at 09:50

## 2019-07-05 ASSESSMENT — PAIN DESCRIPTION - LOCATION: LOCATION: CHEST;INCISION

## 2019-07-05 ASSESSMENT — PAIN SCALES - GENERAL
PAINLEVEL_OUTOF10: 5
PAINLEVEL_OUTOF10: 5
PAINLEVEL_OUTOF10: 0
PAINLEVEL_OUTOF10: 5
PAINLEVEL_OUTOF10: 0

## 2019-07-05 ASSESSMENT — PAIN DESCRIPTION - PROGRESSION
CLINICAL_PROGRESSION: GRADUALLY WORSENING
CLINICAL_PROGRESSION: GRADUALLY WORSENING

## 2019-07-05 ASSESSMENT — PAIN DESCRIPTION - DESCRIPTORS: DESCRIPTORS: ACHING;DULL;DISCOMFORT

## 2019-07-05 ASSESSMENT — PAIN DESCRIPTION - ONSET: ONSET: GRADUAL

## 2019-07-05 ASSESSMENT — PAIN - FUNCTIONAL ASSESSMENT: PAIN_FUNCTIONAL_ASSESSMENT: PREVENTS OR INTERFERES SOME ACTIVE ACTIVITIES AND ADLS

## 2019-07-05 ASSESSMENT — PAIN DESCRIPTION - PAIN TYPE: TYPE: ACUTE PAIN;SURGICAL PAIN

## 2019-07-05 ASSESSMENT — PAIN DESCRIPTION - FREQUENCY: FREQUENCY: INTERMITTENT

## 2019-07-05 NOTE — PROGRESS NOTES
Physical Therapy    Facility/Department: [address] CD[facility]  Treatment notes    Name: Lulu Jeffery  : 1940  MRN: [id number]    Date of Service: 2019    Evaluating PT:  Cathryn Bertrand PT, DPT [id number]        Room #:  3335/8159-C  Diagnosis:  Chest Pain  PMHx:  CABG x 4 (2 weeks ago at outside hospital), HTN, DM, GERD, CAD  Precautions:  Sternal, O2, Falls  Procedures: CABG x 4 (2 weeks ago at outside hospital)  Equipment Recommended:    Pt admitted from [facility] after CABG x 4. Prior to surgery patient lived alone in a condo with 4 stairs to enter with partial rail. Pt ambulated with no AD and was independent prior to surgery. Patient reports at [facility], she was ambulating 25' at PT. Other equipment patient owns: None     Initial Evaluation  Date: 7/3/19 Treatment   Short Term/ Long Term   Goals   AM-PAC 6 Clicks  65/43    Does pt have pain?  No c/o pain no    Bed Mobility  Rolling: Lea  Supine to sit: Lea  Sit to supine: NT  Scooting: Lea Rolling: supervision  Supine to sit: sup  Sit to supine: NT  Scooting: sup Rolling: SBA  Supine to sit: SBA  Sit to supine: SBA  Scooting: SBA   Transfers Sit to stand: Lea using [employer]   Stand to sit: Lea using [employer]  Stand pivot: Lea using [employer] Sit to stand: SBA  Stand to sit: SBA  Stand pivot: Min A with no AD Sit to stand: SBA  Stand to sit: SBA  Stand pivot: SBA   Ambulation    15  feet with Lea using [employer]  25 feet with Lea using [employer]  25 feet with Lea no AD 50 ft with Min A with no AD >200 feet with SBA   Stair negotiation: ascended and descended  NT NT >4 steps with no rail SBA   ROM BLE:  WFL     Strength BLE:  4/5 grossly  Improve strength 1/3 MMT grade   Balance Sitting EOB:  SBA  Dynamic Standing:  Lea using [employer] Sitting EOB: SBA  Dynamic standing: Min a using ww Sitting EOB:  Independent    Dynamic Standing:  SBA       Patient education  Pt educated on sternal precautions    Patient response to education:   Pt verbalized understanding Pt demonstrated skill Pt requires further education in this area   x x x     Comments:  Pt  supine in bed and agreeable to treatment. Pt able to repeat and demonstrate sternal precautions. SpO2 99% seated EOB with 3L O2. PT able to sit EOB and ambulate maintaining precautions. Slightly impulsive once eob standing prior to direction given. C/o SOB with ambulation, SpO2 remained %. PT seated in bedside chair with tray and call light within reach and all needs met. Pt left with call bell in hand and all needs in reach.        Time in  1307  Time out  [address], [address] PTA [id number]

## 2019-07-05 NOTE — DISCHARGE INSTR - COC
Continuity of Care Form    Patient Name: Nela Tobin   :  1940  MRN:  [id number]    Admit date:  2019  Discharge date:  ***    Code Status Order: Full Code   Advance Directives:   [facility] Documentation     Date/Time Healthcare Directive Type of Healthcare Directive Copy in [address] Agent's Name Healthcare Agent's Phone Number    [phone]  Other (Comment) not sure son will bring in  -- -- -- -- --          Admitting Physician:  Spencer Salinas MD  PCP: Frankey Batman, MD    Discharging Nurse: [facility] Unit/Room#: 3364/3495-D  Discharging Unit Phone Number: ***    Emergency Contact:   Extended Emergency Contact Information  Primary Emergency Contact: Narinder Ulrich [facility] [address] Phone: [phone]  Work Phone: [phone]  Relation: Child  Secondary Emergency Contact: Toribio Elizabeth  [place] Phone: [phone]  Relation: Other    Past Surgical History:  Past Surgical History:   Procedure Laterality Date    APPENDECTOMY      BLADDER SUSPENSION      CARDIAC SURGERY      CHOLECYSTECTOMY      COLONOSCOPY  12    diverticulosis, hemorrhoid    COLONOSCOPY  2015    CYST REMOVAL      back    DILATION AND CURETTAGE OF UTERUS      HYSTERECTOMY         Immunization History: There is no immunization history on file for this patient.     Active Problems:  Patient Active Problem List   Diagnosis Code    Chest pain R07.9    CAD (coronary artery disease), native coronary artery I25.10    Hyperlipidemia LDL goal <100 E78.5    Diabetes mellitus ([facility] [address] E11.9    Hypertension I10    GERD (gastroesophageal reflux disease) I90.4    Diastolic CHF, acute (HCC) I50.31    Pulmonary embolism (HCC) I26.99       Isolation/Infection:   Isolation          No Isolation            Nurse Assessment:  Last Vital Signs: /72   Pulse 103   Temp 97.3 °F (36.3 °C) (Temporal) Unplanned Readmission:        10           Discharging to Facility/ Agency   · [facility]: [facility]   · Address:  · Phone:  · Fax:    Dialysis Facility (if applicable)   · Name:  · Address:  · Dialysis Schedule:  · Phone:  · Fax:    / signature: Electronically signed by JANUSZ Richardson on 7/5/2019 at 10:02 AM      PHYSICIAN SECTION    Prognosis: {Prognosis:3974563756}    Condition at Discharge: [facility] Patient Condition:620603264}    Rehab Potential (if transferring to Rehab): {Prognosis:0061203328}    Recommended Labs or Other Treatments After Discharge: ***    Physician Certification: I certify the above information and transfer of Jacqui Thomas  is necessary for the continuing treatment of the diagnosis listed and that she requires [place] for less 30 days.      Update Admission H&P: {CHP DME Changes in RHLDY:483401933}    PHYSICIAN SIGNATURE:  Electronically signed by Devika Collier MD on 7/8/19 at 2:34 PM

## 2019-07-05 NOTE — PROGRESS NOTES
pleural effusion. Assessment    Principal Problem:    Chest pain  Active Problems:    CAD (coronary artery disease), native coronary artery    Hyperlipidemia LDL goal <100    Hypertension    GERD (gastroesophageal reflux disease)    Diastolic CHF, acute (HCC)    Pulmonary embolism (HCC)  Resolved Problems:    * No resolved hospital problems. *      Plan:  Post recent CABG 2 weeks ago presents with chest pain found to have PE  Admitted to monitored bed  1 of 2 blood cultures positive for GPC's-contaminant CNS  Vanco IV x1  assess for ACS troponins negative  ASA  statin  cycle troponin negative  Fasting lipid panel  serial EKG  Echo  Cardiology consult  D-dimer elevated  CTA with suspected right segmental PE  -Also some concern for bypass graft occlusion on CT poor study to assess this  -Discussed with Dr. Ifrah Kyle started  Medications for other co morbidities cont as appropriate w dosage adjustments as necessary    DVT PPx  DC planning converted to full admit  Extensive discussion with family regarding diagnosis, prognosis and plan of care   Plan for DC back to SNF when bed available and follow-up with cardiology as an outpatient closely. Recommend at least 3 months of full anticoagulation for provoked PE.         Electronically signed by Heaven Josue MD on 7/5/2019 at 9:12 AM

## 2019-07-06 LAB
ANION GAP SERPL CALCULATED.3IONS-SCNC: 15 MMOL/L (ref 7–16)
BUN BLDV-MCNC: 11 MG/DL (ref 8–23)
CALCIUM SERPL-MCNC: 9.7 MG/DL (ref 8.6–10.2)
CHLORIDE BLD-SCNC: 98 MMOL/L (ref 98–107)
CO2: 27 MMOL/L (ref 22–29)
CREAT SERPL-MCNC: 0.7 MG/DL (ref 0.5–1)
GFR AFRICAN AMERICAN: >60
GFR NON-AFRICAN AMERICAN: >60 ML/MIN/1.73
GLUCOSE BLD-MCNC: 114 MG/DL (ref 74–99)
POTASSIUM SERPL-SCNC: 3.5 MMOL/L (ref 3.5–5)
SODIUM BLD-SCNC: 140 MMOL/L (ref 132–146)

## 2019-07-06 PROCEDURE — 6370000000 HC RX 637 (ALT 250 FOR IP): Performed by: INTERNAL MEDICINE

## 2019-07-06 PROCEDURE — 80048 BASIC METABOLIC PNL TOTAL CA: CPT

## 2019-07-06 PROCEDURE — 36415 COLL VENOUS BLD VENIPUNCTURE: CPT

## 2019-07-06 PROCEDURE — 2700000000 HC OXYGEN THERAPY PER DAY

## 2019-07-06 PROCEDURE — 2140000000 HC CCU INTERMEDIATE R&B

## 2019-07-06 PROCEDURE — 6360000002 HC RX W HCPCS: Performed by: INTERNAL MEDICINE

## 2019-07-06 PROCEDURE — 2580000003 HC RX 258: Performed by: INTERNAL MEDICINE

## 2019-07-06 RX ADMIN — GUAIFENESIN 400 MG: 400 TABLET ORAL at 14:56

## 2019-07-06 RX ADMIN — CETIRIZINE HYDROCHLORIDE 5 MG: 10 TABLET, FILM COATED ORAL at 09:07

## 2019-07-06 RX ADMIN — GUAIFENESIN 400 MG: 400 TABLET ORAL at 09:07

## 2019-07-06 RX ADMIN — Medication 10 ML: at 20:30

## 2019-07-06 RX ADMIN — COLCHICINE 0.6 MG: 0.6 TABLET, FILM COATED ORAL at 09:07

## 2019-07-06 RX ADMIN — FUROSEMIDE 20 MG: 10 INJECTION, SOLUTION INTRAMUSCULAR; INTRAVENOUS at 09:08

## 2019-07-06 RX ADMIN — APIXABAN 10 MG: 5 TABLET, FILM COATED ORAL at 20:30

## 2019-07-06 RX ADMIN — ACETAMINOPHEN 650 MG: 325 TABLET, FILM COATED ORAL at 07:10

## 2019-07-06 RX ADMIN — ACETAMINOPHEN 650 MG: 325 TABLET, FILM COATED ORAL at 20:30

## 2019-07-06 RX ADMIN — GUAIFENESIN 400 MG: 400 TABLET ORAL at 20:30

## 2019-07-06 RX ADMIN — METOPROLOL TARTRATE 25 MG: 25 TABLET, FILM COATED ORAL at 20:30

## 2019-07-06 RX ADMIN — ASPIRIN 81 MG 81 MG: 81 TABLET ORAL at 09:07

## 2019-07-06 RX ADMIN — COLCHICINE 0.6 MG: 0.6 TABLET, FILM COATED ORAL at 20:30

## 2019-07-06 RX ADMIN — Medication 10 ML: at 09:08

## 2019-07-06 RX ADMIN — ACETAMINOPHEN 650 MG: 325 TABLET, FILM COATED ORAL at 14:56

## 2019-07-06 RX ADMIN — FUROSEMIDE 20 MG: 10 INJECTION, SOLUTION INTRAMUSCULAR; INTRAVENOUS at 17:21

## 2019-07-06 RX ADMIN — PANTOPRAZOLE SODIUM 40 MG: 40 TABLET, DELAYED RELEASE ORAL at 07:10

## 2019-07-06 RX ADMIN — METOPROLOL TARTRATE 25 MG: 25 TABLET, FILM COATED ORAL at 09:06

## 2019-07-06 RX ADMIN — APIXABAN 10 MG: 5 TABLET, FILM COATED ORAL at 09:06

## 2019-07-06 ASSESSMENT — PAIN DESCRIPTION - PROGRESSION
CLINICAL_PROGRESSION: GRADUALLY WORSENING
CLINICAL_PROGRESSION: GRADUALLY WORSENING
CLINICAL_PROGRESSION: NOT CHANGED
CLINICAL_PROGRESSION: GRADUALLY WORSENING

## 2019-07-06 ASSESSMENT — PAIN DESCRIPTION - LOCATION
LOCATION: CHEST;INCISION
LOCATION: CHEST;RIB CAGE

## 2019-07-06 ASSESSMENT — PAIN SCALES - GENERAL
PAINLEVEL_OUTOF10: 6
PAINLEVEL_OUTOF10: 0
PAINLEVEL_OUTOF10: 3
PAINLEVEL_OUTOF10: 5
PAINLEVEL_OUTOF10: 0

## 2019-07-06 ASSESSMENT — PAIN DESCRIPTION - ORIENTATION: ORIENTATION: MID

## 2019-07-06 ASSESSMENT — PAIN DESCRIPTION - ONSET: ONSET: ON-GOING

## 2019-07-06 ASSESSMENT — PAIN DESCRIPTION - PAIN TYPE
TYPE: ACUTE PAIN;SURGICAL PAIN
TYPE: ACUTE PAIN;SURGICAL PAIN

## 2019-07-06 ASSESSMENT — PAIN DESCRIPTION - FREQUENCY: FREQUENCY: INTERMITTENT

## 2019-07-06 ASSESSMENT — PAIN DESCRIPTION - DESCRIPTORS
DESCRIPTORS: ACHING;DULL;DISCOMFORT
DESCRIPTORS: ACHING;SORE;DISCOMFORT

## 2019-07-07 LAB — BLOOD CULTURE, ROUTINE: NORMAL

## 2019-07-07 PROCEDURE — 6360000002 HC RX W HCPCS: Performed by: INTERNAL MEDICINE

## 2019-07-07 PROCEDURE — 2580000003 HC RX 258: Performed by: INTERNAL MEDICINE

## 2019-07-07 PROCEDURE — 2140000000 HC CCU INTERMEDIATE R&B

## 2019-07-07 PROCEDURE — 6370000000 HC RX 637 (ALT 250 FOR IP): Performed by: INTERNAL MEDICINE

## 2019-07-07 PROCEDURE — 2700000000 HC OXYGEN THERAPY PER DAY

## 2019-07-07 PROCEDURE — 97530 THERAPEUTIC ACTIVITIES: CPT

## 2019-07-07 PROCEDURE — 97535 SELF CARE MNGMENT TRAINING: CPT

## 2019-07-07 RX ADMIN — ASPIRIN 81 MG 81 MG: 81 TABLET ORAL at 09:00

## 2019-07-07 RX ADMIN — OXYCODONE HYDROCHLORIDE AND ACETAMINOPHEN 1 TABLET: 5; 325 TABLET ORAL at 20:44

## 2019-07-07 RX ADMIN — GUAIFENESIN 400 MG: 400 TABLET ORAL at 09:00

## 2019-07-07 RX ADMIN — CETIRIZINE HYDROCHLORIDE 5 MG: 10 TABLET, FILM COATED ORAL at 09:00

## 2019-07-07 RX ADMIN — FUROSEMIDE 20 MG: 10 INJECTION, SOLUTION INTRAMUSCULAR; INTRAVENOUS at 18:45

## 2019-07-07 RX ADMIN — PANTOPRAZOLE SODIUM 40 MG: 40 TABLET, DELAYED RELEASE ORAL at 06:19

## 2019-07-07 RX ADMIN — METOPROLOL TARTRATE 25 MG: 25 TABLET, FILM COATED ORAL at 09:00

## 2019-07-07 RX ADMIN — Medication 10 ML: at 20:45

## 2019-07-07 RX ADMIN — FUROSEMIDE 20 MG: 10 INJECTION, SOLUTION INTRAMUSCULAR; INTRAVENOUS at 09:00

## 2019-07-07 RX ADMIN — GUAIFENESIN 400 MG: 400 TABLET ORAL at 15:11

## 2019-07-07 RX ADMIN — METOPROLOL TARTRATE 25 MG: 25 TABLET, FILM COATED ORAL at 20:44

## 2019-07-07 RX ADMIN — APIXABAN 10 MG: 5 TABLET, FILM COATED ORAL at 20:44

## 2019-07-07 RX ADMIN — GUAIFENESIN 400 MG: 400 TABLET ORAL at 20:44

## 2019-07-07 RX ADMIN — APIXABAN 10 MG: 5 TABLET, FILM COATED ORAL at 09:02

## 2019-07-07 RX ADMIN — Medication 10 ML: at 09:00

## 2019-07-07 RX ADMIN — Medication 10 ML: at 18:45

## 2019-07-07 RX ADMIN — OXYCODONE HYDROCHLORIDE AND ACETAMINOPHEN 1 TABLET: 5; 325 TABLET ORAL at 11:07

## 2019-07-07 ASSESSMENT — PAIN SCALES - GENERAL
PAINLEVEL_OUTOF10: 0
PAINLEVEL_OUTOF10: 0
PAINLEVEL_OUTOF10: 5
PAINLEVEL_OUTOF10: 0
PAINLEVEL_OUTOF10: 0
PAINLEVEL_OUTOF10: 5
PAINLEVEL_OUTOF10: 7

## 2019-07-07 ASSESSMENT — PAIN DESCRIPTION - ONSET: ONSET: GRADUAL

## 2019-07-07 ASSESSMENT — PAIN DESCRIPTION - DESCRIPTORS
DESCRIPTORS: SORE;DISCOMFORT
DESCRIPTORS: DISCOMFORT

## 2019-07-07 ASSESSMENT — PAIN DESCRIPTION - LOCATION
LOCATION: CHEST
LOCATION: CHEST

## 2019-07-07 ASSESSMENT — PAIN DESCRIPTION - PAIN TYPE
TYPE: ACUTE PAIN
TYPE: ACUTE PAIN

## 2019-07-07 ASSESSMENT — PAIN DESCRIPTION - FREQUENCY
FREQUENCY: INTERMITTENT
FREQUENCY: INTERMITTENT

## 2019-07-07 ASSESSMENT — PAIN DESCRIPTION - PROGRESSION: CLINICAL_PROGRESSION: GRADUALLY WORSENING

## 2019-07-07 ASSESSMENT — PAIN DESCRIPTION - ORIENTATION
ORIENTATION: MID
ORIENTATION: UPPER

## 2019-07-07 NOTE — PROGRESS NOTES
OT BEDSIDE TREATMENT NOTE      Date:2019  Patient Name: Cathie Zambrano  MRN: [id number]  : 1940  Room: [address] [place], [place] [address]     Evaluating OT: Crispin Molina OTR/L #[id number]      AM-PAC Daily Activity Raw Score:      Recommended Adaptive Equipment:  TBD      Diagnosis: Chest Pain   Patient presented to ED for chest pain and SOB; 2 weeks post CABG x4     Surgery: CABG x4 (19)   Pertinent Medical History: Arthritis, CAD, DM, HTN      Precautions:  Falls, Sternal precautions, O2    Per OT Eval:   Home Living: [facility] admitted from [facility]. Pt lives alone in a 1 story condo with 4 DERRICK and 1 hand rail   Bathroom setup: walk-in shower   Equipment owned: none     Prior Level of Function: Independent with ADLs , Independent with IADLs; ambulated without AD  Driving: yes  Occupation: na    Pain: Denied any pain     Cognition: A&O: 4/4; Follows 2 step directions              Memory:  good              Sequencing:  good              Problem solving:  good              Judgement/safety:  Good- (good recall of sternal precautions)    Functional Assessment:    Initial Eval Status  Date: 7/3/19 Treatment Status  Date:  19 Short Term Goals  Treatment frequency: PRN   Feeding Indep   Independent     Grooming Minimal Assist   Standing at sink Set up seated  Standing CGA Modified [place]    UB Dressing Minimal Assist   Educated on modified techniques  Min A  Good recall of sternal precautions  Modified [place]    LB Dressing Maximal Assist   Min A  Simulated task Minimal Assist    Bathing Moderate Assist  Per Eval  Will continue to assess.  Minimal Assist    Toileting Minimal Assist   Min A  Declined need at this time  Modified [place]    Bed Mobility  Supine to sit: Minimal Assist   Sit to supine: NT     Log roll technique   NT  SBA prior level Supine to sit: Stand by Assist   Sit to supine: Stand by Assist    Functional Transfers Minimal Assist  Min A  Good recall of technique  Stand by Assist Functional Mobility Minimal Assist      Ambulated in room and hallway; trialled use of w/w for light steadying assist (cuing on sternal precautions)  Min A  Slow & steady pace for short distance mobility  Stand by Assist    Balance Sitting:     Static:  Supervision    Dynamic:SBA  Standing: min A Sitting:     Static: Independent    Dynamic:Supervision  Standing: Min A  No AD     Activity Tolerance F-  Fair  Mild SOB with exertion, O2 98% throughout treatment on  3L O2 via NC. F+   Visual/  Perceptual Glasses: yes                         Comments: Upon arrival pt was seated in chair & agreeable for therapy. Pt educated on transfer/mobility safety, precautions, maintaining sternal precautions during functional activities, safety and benefits of increasing activity. Educated pt on shoulder shrugs, rolls & B UE therapeutic exercises to complete within precautions with Good results. Pt verbalized/demonstrated Good understanding, recommend continued education to ensure follow through. At end of session pt left seated in bedside chair with all lines and tubes intact and call light within reach. · Pt has made fair+ progress towards set goals. · Continue with current plan of care    Time in: 9:10am  Time out: 9:20am  Total Tx Time: 11 Minutes    Cele ELAINE  [address] [place] [address], [address] [place] [address]

## 2019-07-07 NOTE — PROGRESS NOTES
Problem:    Chest pain  Active Problems:    CAD (coronary artery disease), native coronary artery    Hyperlipidemia LDL goal <100    Hypertension    GERD (gastroesophageal reflux disease)    Diastolic CHF, acute (HCC)    Pulmonary embolism (HCC)  Resolved Problems:    * No resolved hospital problems.  *      Plan:  Post recent CABG 2 weeks ago presents with chest pain found to have PE  Admitted to monitored bed  1 of 2 blood cultures positive for GPC's-contaminant CNS  Vanco IV x1  assess for ACS troponins negative  ASA  statin  cycle troponin negative  Fasting lipid panel  serial EKG  Echo  Cardiology consult  D-dimer elevated  CTA with suspected right segmental PE  -Also some concern for bypass graft occlusion on CT poor study to assess this  -Discussed with Dr. Longoria Scripture started  Medications for other co morbidities cont as appropriate w dosage adjustments as necessary    DVT PPx  DC planning converted to full admit  Extensive discussion with family regarding diagnosis, prognosis and plan of care     CINDY soon        Electronically signed by Franko Soto MD on 7/7/2019 at 2:16 PM

## 2019-07-07 NOTE — PROGRESS NOTES
PROGRESS NOTE       PATIENT PROBLEM LIST:  Principal Problem:    Chest pain  Active Problems:    CAD (coronary artery disease), native coronary artery    Hyperlipidemia LDL goal <100    Hypertension    GERD (gastroesophageal reflux disease)    Diastolic CHF, acute (HCC)    Pulmonary embolism (HCC)  Resolved Problems:    * No resolved hospital problems. *      SUBJECTIVE:  Raheem Elizabeth states she feels somewhat tired today and did not ambulate much at all and also complained of mild dyspnea. REVIEW OF SYSTEMS:  General ROS: positive for - fatigue. Psychological ROS: negative for - anxiety , depression  Ophthalmic ROS: negative for - decreased vision or visual distortion. ENT ROS: negative  Allergy and Immunology ROS: negative  Hematological and Lymphatic ROS: negative  Endocrine: no heat or cold intolerance and no polyphagia, polydipsia, or polyuria  Respiratory ROS: positive for - shortness of breath  Cardiovascular ROS: positive for - chest pain and dyspnea on exertion. Gastrointestinal ROS: no abdominal pain, change in bowel habits, or black or bloody stools  Genito-Urinary ROS: no nocturia, dysuria, trouble voiding, frequency or hematuria  Musculoskeletal ROS: negative for- myalgias, arthralgias, or claudication  Neurological ROS: no TIA or stroke symptoms otherwise no significant change in symptoms or problems since yesterday as documented in previous progress notes.     SCHEDULED MEDICATIONS:   furosemide  20 mg Intravenous BID    apixaban  10 mg Oral BID    colchicine  0.6 mg Oral BID    guaiFENesin  400 mg Oral TID    aspirin  81 mg Oral Daily    metoprolol tartrate  25 mg Oral BID    pantoprazole  40 mg Oral QAM AC    polyethylene glycol  17 g Oral Daily    senna  2 tablet Oral Daily    sodium chloride flush  10 mL Intravenous 2 times per day    cetirizine  5 mg Oral Daily       VITAL SIGNS: Profile: No results for input(s): CKTOTAL, CKMB, TROPONINI in the last 72 hours. Lipid Profile:   Lab Results   Component Value Date    TRIG 137 2019    HDL 26 2019    LDLCALC 102 2019    CHOL 155 2019      Hemoglobin A1C: No components found for: HGBA1C     RAD:   Xr Abdomen (kub) (single Ap View)    Result Date: 2019  Reading location: 200 HISTORY: Abdominal pain. TECHNIQUE: 2 views of the abdomen were obtained. FINDINGS: There is a nonobstructive bowel gas pattern. No abnormal air fluid levels are noted. There is a normal amount of stool seen within the colon. There are no findings to suggest constipation. The gallbladder is surgically absent. Gross free air is not appreciated. 1. There is no evidence of bowel obstruction. Xr Chest Portable    Result Date: 2019  Patient MRN: [id number] : 1940 Age:  69 years Gender: Female Order Date: 2019 8:30 AM Exam: XR CHEST PORTABLE Number of Views: 1 Indication:   Chest pain. Comparison: 3/6/2019 Findings: There is a stable, enlarged cardiomediastinal silhouette with bibasilar airspace disease, left greater the right, with left pleural effusion. Vascular calcifications thoracic aorta. . No pneumothorax. .     1. Stable, enlarged cardiomediastinal silhouette with thoracic aortic vascular calcifications. 2. Bibasilar airspace disease, nonspecific finding, there is since previous exam, left greater right, concerning for a bibasilar infiltrate/pneumonia left pleural effusion. Other etiologies are not excluded as this is a nonspecific finding. Left pleural effusion. Cta Chest W Contrast    Addendum Date: 2019    ADDENDUM: Upon further review, the patient has 2 venous bypass grafts arising from the anterior ascending aorta that are each occluded approximately 1 cm from the aorta. These results were communicated to the nurse taking care of the patient at 1428 hours on 2019.     Result Date: 2019  Patient MRN: consider CT at 6-12 months >6 mm (>0.1 ml)             Single:  Ground Glass              CT at 6-12 months                                             Single:  Part Solid              CT at 3-6 months to confirm                                                                                        persistence. If unchanged                                                                                       and solid component remains                                                                                       <6 mm, annual CT should be                                                                                       performed for 5 years                                              Multiple                                 CT at 3-6 months. Subsequent management                                                                                       based on the most                                                                                       suspicious nodule(s) __________________________________________________________________ Note - Newly detected indeterminate nodule in persons 28years of age or older. *   Average of length and width. t   Minimal or absent history of smoking and of other known risk factors. #  History of smoking or of other known risk factors. Us Retroperitoneal Limited    Result Date: 6/12/2019  Reading Location: 200 Indication: Flank pain Comparison: None available. Technique: Real-time grayscale and color Doppler ultrasound of the kidneys and bladder was obtained. Findings: The right kidney is normal in morphology with normal cortical thickness and echogenicity. The right kidney measures 12.5 cm in length. There is no renal calculus or hydronephrosis.  The left kidney is normal in morphology with normal cortical thickness and echogenicity. The left kidney measures 10.3 cm in length. There is no renal calculus or hydronephrosis. The urinary bladder is partially distended and grossly unremarkable. Unremarkable renal ultrasound. EKG: See Report  Echo: 7/3/2019  Summary   Left ventricle grossly normal in size.   Normal LV segmental wall motion.   Normal left ventricular wall thickness.   Estimated left ventricular ejection fraction is 67±5%.   <50% criteria for diastolic dysfunction.   The LAESV Index is <34ml/m2.   Normal right ventricular size and function   Physiologic and/or trace mitral regurgitation is present.   The tricuspid valve appears structurally normal.   RVSP is 30 mmHg.   Physiologic and/or trace tricuspid regurgitation.  Sruthi Hick is a small pericardial effusion.   Left pleural effusion.   Technically fair quality study. IMPRESSIONS:  Principal Problem:    Chest pain  Active Problems:    CAD (coronary artery disease), native coronary artery    Hyperlipidemia LDL goal <100    Hypertension    GERD (gastroesophageal reflux disease)    Diastolic CHF, acute (HCC)    Pulmonary embolism (HCC)  Resolved Problems:    * No resolved hospital problems. *      RECOMMENDATIONS:  Continue present drug regimen and increase ambulation as tolerated. I have urged Mrs. Elizabeth to return to her primary cardiologist Dr. Janell Ramos to review her present findings and perhaps consider repeat cardiac catheterization and potential intervention if clinically warranted. This is based on the suggestion from her pulmonary CT angiogram that two bypass grafts may be occluded. I have spent more than 6 minutes face to face with [address] [place] [address] and reviewing notes and laboratory data, with greater than 50% of this time instructing and counseling the patient face to face regarding my findings and recommendations and I have answered all questions as posed to me by Ms. Miguelina Peralta, DO FACP,FACC,[facility]      NOTE: This report was transcribed using voice recognition software.   Every effort was made to ensure accuracy; however, inadvertent computerized transcription errors may be present

## 2019-07-08 VITALS
OXYGEN SATURATION: 98 % | BODY MASS INDEX: 36.79 KG/M2 | WEIGHT: 207.6 LBS | HEIGHT: 63 IN | TEMPERATURE: 98.4 F | SYSTOLIC BLOOD PRESSURE: 123 MMHG | HEART RATE: 84 BPM | DIASTOLIC BLOOD PRESSURE: 62 MMHG | RESPIRATION RATE: 16 BRPM

## 2019-07-08 LAB
BLOOD CULTURE, ROUTINE: NORMAL
CULTURE, BLOOD 2: ABNORMAL
CULTURE, BLOOD 2: ABNORMAL
ORGANISM: ABNORMAL

## 2019-07-08 PROCEDURE — 6360000002 HC RX W HCPCS: Performed by: INTERNAL MEDICINE

## 2019-07-08 PROCEDURE — 2580000003 HC RX 258: Performed by: INTERNAL MEDICINE

## 2019-07-08 PROCEDURE — 6370000000 HC RX 637 (ALT 250 FOR IP): Performed by: INTERNAL MEDICINE

## 2019-07-08 PROCEDURE — 2700000000 HC OXYGEN THERAPY PER DAY

## 2019-07-08 RX ORDER — OXYCODONE HYDROCHLORIDE AND ACETAMINOPHEN 5; 325 MG/1; MG/1
1 TABLET ORAL EVERY 6 HOURS PRN
Qty: 3 TABLET | Refills: 0 | Status: SHIPPED | OUTPATIENT
Start: 2019-07-08 | End: 2019-07-11

## 2019-07-08 RX ORDER — FUROSEMIDE 20 MG/1
20 TABLET ORAL DAILY
Qty: 60 TABLET | Refills: 3 | DISCHARGE
Start: 2019-07-08 | End: 2021-05-11 | Stop reason: SDUPTHER

## 2019-07-08 RX ADMIN — GUAIFENESIN 400 MG: 400 TABLET ORAL at 14:15

## 2019-07-08 RX ADMIN — ASPIRIN 81 MG 81 MG: 81 TABLET ORAL at 09:11

## 2019-07-08 RX ADMIN — COLCHICINE 0.6 MG: 0.6 TABLET, FILM COATED ORAL at 09:11

## 2019-07-08 RX ADMIN — CETIRIZINE HYDROCHLORIDE 5 MG: 10 TABLET, FILM COATED ORAL at 09:11

## 2019-07-08 RX ADMIN — GUAIFENESIN 400 MG: 400 TABLET ORAL at 09:11

## 2019-07-08 RX ADMIN — PANTOPRAZOLE SODIUM 40 MG: 40 TABLET, DELAYED RELEASE ORAL at 05:48

## 2019-07-08 RX ADMIN — APIXABAN 10 MG: 5 TABLET, FILM COATED ORAL at 09:11

## 2019-07-08 RX ADMIN — METOPROLOL TARTRATE 25 MG: 25 TABLET, FILM COATED ORAL at 09:11

## 2019-07-08 RX ADMIN — FUROSEMIDE 20 MG: 10 INJECTION, SOLUTION INTRAMUSCULAR; INTRAVENOUS at 09:12

## 2019-07-08 RX ADMIN — Medication 10 ML: at 09:12

## 2019-07-08 ASSESSMENT — PAIN SCALES - GENERAL
PAINLEVEL_OUTOF10: 0

## 2019-07-08 NOTE — PLAN OF CARE
Problem: Falls - Risk of:  Goal: Will remain free from falls  Description  Will remain free from falls  7/3/2019 0930 by Maddy Joyce RN  Outcome: Met This Shift  7/2/2019 2306 by Sujey Mixon RN  Outcome: Met This Shift  Goal: Absence of physical injury  Description  Absence of physical injury  Outcome: Met This Shift     Problem: Risk for Impaired Skin Integrity  Goal: Tissue integrity - skin and mucous membranes  Description  Structural intactness and normal physiological function of skin and  mucous membranes.   7/3/2019 0930 by Maddy Joyce RN  Outcome: Met This Shift  7/2/2019 2306 by Sujey Mixon RN  Outcome: Met This Shift     Problem: Cardiac Output - Decreased:  Goal: Hemodynamic stability will improve  Description  Hemodynamic stability will improve  Outcome: Met This Shift     Problem: Tissue Perfusion - Cardiopulmonary, Altered:  Goal: Absence of angina  Description  Absence of angina  7/3/2019 0930 by Maddy Joyce RN  Outcome: Met This Shift  7/2/2019 2306 by Sujey Mixon RN  Outcome: Met This Shift
Problem: Falls - Risk of:  Goal: Will remain free from falls  Description  Will remain free from falls  7/4/2019 2216 by Merritt Hughes RN  Outcome: Met This Shift     Problem: Pain:  Description  Pain management should include both nonpharmacologic and pharmacologic interventions. Goal: Pain level will decrease  Description  Pain level will decrease  Outcome: Met This Shift     Problem: Musculor/Skeletal Functional Status  Goal: Absence of falls  Outcome: Met This Shift     Problem: Pain:  Description  Pain management should include both nonpharmacologic and pharmacologic interventions.   Goal: Pain level will decrease  Description  Pain level will decrease  Outcome: Met This Shift
Problem: Falls - Risk of:  Goal: Will remain free from falls  Description  Will remain free from falls  Outcome: Met This Shift
Problem: Falls - Risk of:  Goal: Will remain free from falls  Description  Will remain free from falls  Outcome: Met This Shift  Goal: Absence of physical injury  Description  Absence of physical injury  Outcome: Met This Shift     Problem: Risk for Impaired Skin Integrity  Goal: Tissue integrity - skin and mucous membranes  Description  Structural intactness and normal physiological function of skin and  mucous membranes.   Outcome: Met This Shift     Problem: Discharge Planning:  Goal: Discharged to appropriate level of care  Description  Discharged to appropriate level of care  Outcome: Met This Shift     Problem: Cardiac Output - Decreased:  Goal: Hemodynamic stability will improve  Description  Hemodynamic stability will improve  Outcome: Met This Shift     Problem: Serum Glucose Level - Abnormal:  Goal: Ability to maintain appropriate glucose levels will improve  Description  Ability to maintain appropriate glucose levels will improve  Outcome: Met This Shift     Problem: Pain:  Goal: Pain level will decrease  Description  Pain level will decrease  Outcome: Met This Shift  Goal: Control of acute pain  Description  Control of acute pain  Outcome: Met This Shift  Goal: Control of chronic pain  Description  Control of chronic pain  Outcome: Met This Shift     Problem: Tissue Perfusion - Cardiopulmonary, Altered:  Goal: Absence of angina  Description  Absence of angina  Outcome: Met This Shift  Goal: Circulatory function within specified parameters  Description  Circulatory function within specified parameters  Outcome: Met This Shift  Goal: Hemodynamic stability will improve  Description  Hemodynamic stability will improve  Outcome: Met This Shift     Problem: Tobacco Use:  Goal: Will participate in inpatient tobacco-use cessation counseling  Description  Will participate in inpatient tobacco-use cessation counseling  Outcome: Met This Shift     Problem: Musculor/Skeletal Functional Status  Goal: Highest
chronic pain  7/7/2019 1525 by Sukh Roberts RN  Outcome: Met This Shift     Problem: Tissue Perfusion - Cardiopulmonary, Altered:  Goal: Absence of angina  Description  Absence of angina  7/7/2019 1525 by Sukh Roberts RN  Outcome: Met This Shift  Goal: Circulatory function within specified parameters  Description  Circulatory function within specified parameters  7/7/2019 1525 by Sukh Roberts RN  Outcome: Met This Shift  Goal: Hemodynamic stability will improve  Description  Hemodynamic stability will improve  7/7/2019 1525 by Sukh oRberts RN  Outcome: Met This Shift     Problem: Tobacco Use:  Goal: Will participate in inpatient tobacco-use cessation counseling  Description  Will participate in inpatient tobacco-use cessation counseling  7/7/2019 1525 by Sukh Roberts RN  Outcome: Met This Shift     Problem: Musculor/Skeletal Functional Status  Goal: Highest potential functional level  7/7/2019 1525 by Sukh Roberts RN  Outcome: Met This Shift  Goal: Absence of falls  7/7/2019 1525 by Sukh Roberts RN  Outcome: Met This Shift     Problem: Pain:  Goal: Pain level will decrease  Description  Pain level will decrease  7/7/2019 2123 by Alexis Miles RN  Outcome: Met This Shift  7/7/2019 1525 by Sukh Roberts RN  Outcome: Met This Shift  Goal: Control of acute pain  Description  Control of acute pain  7/7/2019 1525 by Sukh Roberts RN  Outcome: Met This Shift  Goal: Control of chronic pain  Description  Control of chronic pain  7/7/2019 1525 by Sukh Roberts RN  Outcome: Met This Shift

## 2019-07-08 NOTE — PROGRESS NOTES
Subjective:  Feeling better No CP,  F, V, D, P   Still somewhat short of breath. No chest pain  Objective:    /64   Pulse 91   Temp 96.7 °F (35.9 °C) (Temporal)   Resp 16   Ht 5' 3\" (1.6 m)   Wt 207 lb 9.6 oz (94.2 kg)   SpO2 94%   BMI 36.77 kg/m²     24HR INTAKE/OUTPUT:      Intake/Output Summary (Last 24 hours) at 7/8/2019 1050  Last data filed at 7/8/2019 0600  Gross per 24 hour   Intake 730 ml   Output 1100 ml   Net -370 ml     NAD  Heart:  RRR, no murmurs, gallops, or rubs. Lungs:  CTA bilaterally, no wheeze, rales or rhonchi  Abd: bowel sounds present, nontender, nondistended, no masses  Extrem:  No clubbing, cyanosis, or edema    Most Recent Labs  Lab Results   Component Value Date    WBC 6.6 07/05/2019    HGB 10.8 (L) 07/05/2019    HCT 34.0 07/05/2019     07/05/2019     07/06/2019    K 3.5 07/06/2019    CL 98 07/06/2019    CREATININE 0.7 07/06/2019    BUN 11 07/06/2019    CO2 27 07/06/2019    GLUCOSE 114 (H) 07/06/2019    ALT 32 07/02/2019    AST 30 07/02/2019    INR 0.9 10/26/2010    TSH 1.523 10/26/2010    LABA1C 6.2 (H) 06/24/2019     No results for input(s): MG in the last 72 hours. Lab Results   Component Value Date    CALCIUM 9.7 07/06/2019        CTA CHEST W CONTRAST   Final Result   Addendum 1 of 1   ADDENDUM: Upon further review, the patient has 2 venous bypass grafts   arising from the anterior ascending aorta that are each occluded   approximately 1 cm from the aorta. These results were communicated to   the nurse taking care of the patient at 1428 hours on 7/4/2019. Final      XR CHEST PORTABLE   Final Result   1. Stable, enlarged cardiomediastinal silhouette with thoracic aortic   vascular calcifications. 2. Bibasilar airspace disease, nonspecific finding, there is since   previous exam, left greater right, concerning for a bibasilar   infiltrate/pneumonia left pleural effusion. Other etiologies are not   excluded as this is a nonspecific finding.  Left pleural

## 2019-07-08 NOTE — DISCHARGE SUMMARY
Physician Discharge Summary     Patient ID:  Eliana Black  [id number]  47 y.o.  1940    Admit date: 7/2/2019    Discharge date and time:  7/8/2019    Admission Diagnoses:   Patient Active Problem List   Diagnosis    Chest pain    CAD (coronary artery disease), native coronary artery    Hyperlipidemia LDL goal <100    Diabetes mellitus ([address]    Hypertension    GERD (gastroesophageal reflux disease)    Diastolic CHF, acute ([address]    Pulmonary embolism ([address]       Discharge Diagnoses: Principal Problem:    Chest pain  Active Problems:    CAD (coronary artery disease), native coronary artery    Hyperlipidemia LDL goal <100    Hypertension    GERD (gastroesophageal reflux disease)    Diastolic CHF, acute ([address]    Pulmonary embolism (HCC)  Resolved Problems:    * No resolved hospital problems. *      Consults: IP CONSULT TO HOSPITALIST  IP CONSULT TO CARDIOLOGY    Procedures: na    Hospital Course: Post recent CABG 2 weeks ago presents with chest pain found to have PE  Admitted to monitored bed  1 of 2 blood cultures positive for GPC's-contaminant CNS  Vanco IV x1  assess for ACS troponins negative  ASA  statin  cycle troponin negative  Treated for HFpEF with IV lasix, improved  Echo  Cardiology consult  D-dimer elevated  CTA with suspected right segmental PE  -Also some concern for bypass graft occlusion on CT-- poor study to assess this  -Discussed with Dr. Niko Stoll  -Eliquis started-- recommend 3mths for provoked PE     Plan for DC back to SNF when bed available and follow-up with cardiology as an outpatient closely. Recommend at least 3 months of full anticoagulation for provoked PE.         Discharge Exam:  See progress note from today    Condition:  Stable    Disposition: home    Patient Instructions:   Current Discharge Medication List      START taking these medications    Details   apixaban (ELIQUIS) 5 MG TABS tablet 10mgPO BID through 7/10, then 5mg BID  Qty: 60 tablet, Refills: 2      furosemide Stopping:         Omega-3 Fatty Acids (OMEGA 3 PO) Comments:   Reason for Stopping:             Activity: activity as tolerated  Diet: cardiac diet    Follow-up with PCP, Cardiology, CT surg    Note that over 30 minutes was spent in preparing discharge papers, discussing discharge with patient, staff, consultants, medication review, arranging follow up, etc.    Signed:  Eriberto Beckett MD  7/8/2019  2:38 PM

## 2019-07-09 ENCOUNTER — HOSPITAL ENCOUNTER (OUTPATIENT)
Age: 79
Discharge: HOME | End: 2019-07-11

## 2019-07-09 LAB
ANION GAP SERPL CALCULATED.3IONS-SCNC: 12 MMOL/L (ref 7–16)
BUN BLDV-MCNC: 13 MG/DL (ref 8–23)
CALCIUM SERPL-MCNC: 10.6 MG/DL (ref 8.6–10.2)
CHLORIDE BLD-SCNC: 96 MMOL/L (ref 98–107)
CO2: 31 MMOL/L (ref 22–29)
CREAT SERPL-MCNC: 0.8 MG/DL (ref 0.5–1)
GFR AFRICAN AMERICAN: >60
GFR NON-AFRICAN AMERICAN: >60 ML/MIN/1.73
GLUCOSE BLD-MCNC: 109 MG/DL (ref 74–99)
HCT VFR BLD CALC: 38.2 % (ref 34–48)
HEMOGLOBIN: 12.1 G/DL (ref 11.5–15.5)
MCH RBC QN AUTO: 30.3 PG (ref 26–35)
MCHC RBC AUTO-ENTMCNC: 31.7 % (ref 32–34.5)
MCV RBC AUTO: 95.7 FL (ref 80–99.9)
PDW BLD-RTO: 14.6 FL (ref 11.5–15)
PLATELET # BLD: 431 E9/L (ref 130–450)
PMV BLD AUTO: 9.1 FL (ref 7–12)
POTASSIUM SERPL-SCNC: 3.7 MMOL/L (ref 3.5–5)
RBC # BLD: 3.99 E12/L (ref 3.5–5.5)
SODIUM BLD-SCNC: 139 MMOL/L (ref 132–146)
WBC # BLD: 6.9 E9/L (ref 4.5–11.5)

## 2019-07-09 PROCEDURE — 80048 BASIC METABOLIC PNL TOTAL CA: CPT

## 2019-07-09 PROCEDURE — 85027 COMPLETE CBC AUTOMATED: CPT

## 2019-07-09 PROCEDURE — 36415 COLL VENOUS BLD VENIPUNCTURE: CPT

## 2019-07-12 ENCOUNTER — CLINICAL DOCUMENTATION (OUTPATIENT)
Dept: FAMILY MEDICINE CLINIC | Age: 79
End: 2019-07-12

## 2019-07-12 ASSESSMENT — ENCOUNTER SYMPTOMS
SORE THROAT: 0
ABDOMINAL DISTENTION: 0
SHORTNESS OF BREATH: 0
CHOKING: 0
RHINORRHEA: 0
RECTAL PAIN: 0
ALLERGIC/IMMUNOLOGIC NEGATIVE: 1
DIARRHEA: 0
COUGH: 0
RESPIRATORY NEGATIVE: 1
CONSTIPATION: 0
COLOR CHANGE: 0
EYE REDNESS: 0
EYE DISCHARGE: 0
NAUSEA: 0
SINUS PRESSURE: 0
BLOOD IN STOOL: 0
GASTROINTESTINAL NEGATIVE: 1
STRIDOR: 0
PHOTOPHOBIA: 0
EYE PAIN: 0
CHEST TIGHTNESS: 0
APNEA: 0
BACK PAIN: 1
EYE ITCHING: 0
VOICE CHANGE: 0
SINUS PAIN: 0
ANAL BLEEDING: 0
WHEEZING: 0
TROUBLE SWALLOWING: 0
ABDOMINAL PAIN: 0
VOMITING: 0
FACIAL SWELLING: 0

## 2019-07-12 NOTE — PROGRESS NOTES
and wound. Allergic/Immunologic: Negative. Negative for environmental allergies, food allergies and immunocompromised state. Neurological: Negative for dizziness, tremors, seizures, syncope, facial asymmetry, speech difficulty, weakness, light-headedness, numbness and headaches. Hematological: Negative for adenopathy. Bruises/bleeds easily. Psychiatric/Behavioral: Negative. Past Medical/Surgical Hx;  Reviewed with patient      Diagnosis Date    Arthritis     shoulders, hips and knees, fingers    Bronchitis     CAD (coronary artery disease)     some blockage, being treated with aspirin    Diabetes mellitus ([place])     borderlline    GERD (gastroesophageal reflux disease)     Hyperlipidemia     Hypertension     Sinus disorder      Past Surgical History:   Procedure Laterality Date    APPENDECTOMY      BLADDER SUSPENSION      CARDIAC SURGERY      CHOLECYSTECTOMY      COLONOSCOPY  11/29/12    diverticulosis, hemorrhoid    COLONOSCOPY  september 2015    CYST REMOVAL      back    DILATION AND CURETTAGE OF UTERUS      HYSTERECTOMY         Past Family Hx:  Reviewed with patient  No family history on file. Social Hx:  Reviewed with patient  Social History     Tobacco Use    Smoking status: Former Smoker    Smokeless tobacco: Never Used   Substance Use Topics    Alcohol use: No       OBJECTIVE  There were no vitals taken for this visit. Problem List:  [facility] does not have any pertinent problems on file. PHYS EX:  Physical Exam   Constitutional: She is oriented to person, place, and time. She appears well-developed and well-nourished. No distress. HENT:   Head: Normocephalic and atraumatic. Right Ear: External ear normal.   Left Ear: External ear normal.   Nose: Nose normal.   Mouth/Throat: Oropharynx is clear and moist. No oropharyngeal exudate. Eyes: Pupils are equal, round, and reactive to light. Conjunctivae and EOM are normal. Right eye exhibits no discharge.  Left eye

## 2019-08-14 ENCOUNTER — HOSPITAL ENCOUNTER (OUTPATIENT)
Dept: INTERVENTIONAL RADIOLOGY/VASCULAR | Age: 79
Discharge: HOME | End: 2019-08-14
Payer: MEDICARE

## 2019-08-14 ENCOUNTER — HOSPITAL ENCOUNTER (OUTPATIENT)
Age: 79
Discharge: HOME | End: 2019-08-14
Payer: MEDICARE

## 2019-08-14 ENCOUNTER — HOSPITAL ENCOUNTER (OUTPATIENT)
Dept: CT IMAGING | Age: 79
Discharge: HOME | End: 2019-08-14
Payer: MEDICARE

## 2019-08-14 DIAGNOSIS — R07.9 CHEST PAIN, UNSPECIFIED TYPE: ICD-10-CM

## 2019-08-14 DIAGNOSIS — I87.8 POOR VENOUS ACCESS: ICD-10-CM

## 2019-08-14 LAB
ANION GAP SERPL CALCULATED.3IONS-SCNC: 11 MMOL/L (ref 7–16)
BUN BLDV-MCNC: 16 MG/DL (ref 8–23)
CALCIUM SERPL-MCNC: 10.3 MG/DL (ref 8.6–10.2)
CHLORIDE BLD-SCNC: 102 MMOL/L (ref 98–107)
CO2: 27 MMOL/L (ref 22–29)
CREAT SERPL-MCNC: 0.7 MG/DL (ref 0.5–1)
GFR AFRICAN AMERICAN: >60
GFR NON-AFRICAN AMERICAN: >60 ML/MIN/1.73
GLUCOSE FASTING: 120 MG/DL (ref 74–99)
POTASSIUM SERPL-SCNC: 4.4 MMOL/L (ref 3.5–5)
SODIUM BLD-SCNC: 140 MMOL/L (ref 132–146)

## 2019-08-14 PROCEDURE — 80048 BASIC METABOLIC PNL TOTAL CA: CPT

## 2019-08-14 PROCEDURE — 6360000004 HC RX CONTRAST MEDICATION: Performed by: RADIOLOGY

## 2019-08-14 PROCEDURE — 36415 COLL VENOUS BLD VENIPUNCTURE: CPT

## 2019-08-14 PROCEDURE — 75574 CT ANGIO HRT W/3D IMAGE: CPT

## 2019-08-14 PROCEDURE — 36573 INSJ PICC RS&I 5 YR+: CPT | Performed by: RADIOLOGY

## 2019-08-14 RX ADMIN — IOPAMIDOL 100 ML: 755 INJECTION, SOLUTION INTRAVENOUS at 12:20

## 2019-08-27 ENCOUNTER — HOSPITAL ENCOUNTER (OUTPATIENT)
Age: 79
Discharge: HOME | End: 2019-08-27
Payer: MEDICARE

## 2019-08-27 ENCOUNTER — HOSPITAL ENCOUNTER (OUTPATIENT)
Dept: CT IMAGING | Age: 79
Discharge: HOME | End: 2019-08-27
Payer: MEDICARE

## 2019-08-27 DIAGNOSIS — R10.9 ABDOMINAL PAIN, UNSPECIFIED ABDOMINAL LOCATION: ICD-10-CM

## 2019-08-27 LAB
BUN BLDV-MCNC: 13 MG/DL (ref 8–23)
CREAT SERPL-MCNC: 0.7 MG/DL (ref 0.5–1)
GFR AFRICAN AMERICAN: >60
GFR NON-AFRICAN AMERICAN: >60 ML/MIN/1.73

## 2019-08-27 PROCEDURE — 74177 CT ABD & PELVIS W/CONTRAST: CPT

## 2019-08-27 PROCEDURE — 36415 COLL VENOUS BLD VENIPUNCTURE: CPT

## 2019-08-27 PROCEDURE — 82565 ASSAY OF CREATININE: CPT

## 2019-08-27 PROCEDURE — 6360000004 HC RX CONTRAST MEDICATION: Performed by: RADIOLOGY

## 2019-08-27 PROCEDURE — 84520 ASSAY OF UREA NITROGEN: CPT

## 2019-08-27 RX ADMIN — IOPAMIDOL 80 ML: 755 INJECTION, SOLUTION INTRAVENOUS at 14:02

## 2019-08-27 RX ADMIN — IOHEXOL 50 ML: 240 INJECTION, SOLUTION INTRATHECAL; INTRAVASCULAR; INTRAVENOUS; ORAL at 14:02

## 2019-10-09 ENCOUNTER — HOSPITAL ENCOUNTER (OUTPATIENT)
Age: 79
Discharge: HOME | End: 2019-10-11
Payer: MEDICARE

## 2019-10-09 ENCOUNTER — HOSPITAL ENCOUNTER (OUTPATIENT)
Age: 79
Discharge: HOME | End: 2019-10-09
Payer: MEDICARE

## 2019-10-09 LAB
ABO/RH: NORMAL
ANION GAP SERPL CALCULATED.3IONS-SCNC: 8 MMOL/L (ref 7–16)
ANTIBODY SCREEN: NORMAL
APTT: 37.7 SEC (ref 24.5–35.1)
BILIRUBIN URINE: NEGATIVE
BLOOD, URINE: NEGATIVE
BUN BLDV-MCNC: 13 MG/DL (ref 8–23)
CALCIUM SERPL-MCNC: 11 MG/DL (ref 8.6–10.2)
CHLORIDE BLD-SCNC: 104 MMOL/L (ref 98–107)
CLARITY: CLEAR
CO2: 31 MMOL/L (ref 22–29)
COLOR: YELLOW
CREAT SERPL-MCNC: 0.7 MG/DL (ref 0.5–1)
GFR AFRICAN AMERICAN: >60
GFR NON-AFRICAN AMERICAN: >60 ML/MIN/1.73
GLUCOSE BLD-MCNC: 116 MG/DL (ref 74–99)
GLUCOSE URINE: NEGATIVE MG/DL
HCT VFR BLD CALC: 43.8 % (ref 34–48)
HEMOGLOBIN: 13.9 G/DL (ref 11.5–15.5)
INR BLD: 1.1
KETONES, URINE: NEGATIVE MG/DL
LEUKOCYTE ESTERASE, URINE: NEGATIVE
MCH RBC QN AUTO: 30 PG (ref 26–35)
MCHC RBC AUTO-ENTMCNC: 31.7 % (ref 32–34.5)
MCV RBC AUTO: 94.6 FL (ref 80–99.9)
NITRITE, URINE: NEGATIVE
PDW BLD-RTO: 12.8 FL (ref 11.5–15)
PH UA: 7 (ref 5–9)
PLATELET # BLD: 199 E9/L (ref 130–450)
PMV BLD AUTO: 9.3 FL (ref 7–12)
POTASSIUM SERPL-SCNC: 5.1 MMOL/L (ref 3.5–5)
PROTEIN UA: NEGATIVE MG/DL
PROTHROMBIN TIME: 12.2 SEC (ref 9.3–12.4)
RBC # BLD: 4.63 E12/L (ref 3.5–5.5)
SODIUM BLD-SCNC: 143 MMOL/L (ref 132–146)
SPECIFIC GRAVITY UA: <=1.005 (ref 1–1.03)
UROBILINOGEN, URINE: 0.2 E.U./DL
WBC # BLD: 5.1 E9/L (ref 4.5–11.5)

## 2019-10-09 PROCEDURE — 81003 URINALYSIS AUTO W/O SCOPE: CPT

## 2019-10-09 PROCEDURE — 85027 COMPLETE CBC AUTOMATED: CPT

## 2019-10-09 PROCEDURE — 86900 BLOOD TYPING SEROLOGIC ABO: CPT

## 2019-10-09 PROCEDURE — 80048 BASIC METABOLIC PNL TOTAL CA: CPT

## 2019-10-09 PROCEDURE — 86901 BLOOD TYPING SEROLOGIC RH(D): CPT

## 2019-10-09 PROCEDURE — 86850 RBC ANTIBODY SCREEN: CPT

## 2019-10-09 PROCEDURE — 85610 PROTHROMBIN TIME: CPT

## 2019-10-09 PROCEDURE — 36415 COLL VENOUS BLD VENIPUNCTURE: CPT

## 2019-10-09 PROCEDURE — 85730 THROMBOPLASTIN TIME PARTIAL: CPT

## 2019-10-11 ENCOUNTER — HOSPITAL ENCOUNTER (OUTPATIENT)
Dept: CARDIAC CATH/INVASIVE PROCEDURES | Age: 79
Setting detail: OBSERVATION
Discharge: HOME | End: 2019-10-12
Attending: INTERNAL MEDICINE | Admitting: INTERNAL MEDICINE
Payer: MEDICARE

## 2019-10-11 DIAGNOSIS — I25.10 CAD IN NATIVE ARTERY: ICD-10-CM

## 2019-10-11 LAB
ABO/RH: NORMAL
ANTIBODY SCREEN: NORMAL

## 2019-10-11 PROCEDURE — G0379 DIRECT REFER HOSPITAL OBSERV: HCPCS

## 2019-10-11 PROCEDURE — 2500000003 HC RX 250 WO HCPCS

## 2019-10-11 PROCEDURE — C1769 GUIDE WIRE: HCPCS

## 2019-10-11 PROCEDURE — 6370000000 HC RX 637 (ALT 250 FOR IP): Performed by: INTERNAL MEDICINE

## 2019-10-11 PROCEDURE — 86901 BLOOD TYPING SEROLOGIC RH(D): CPT

## 2019-10-11 PROCEDURE — 86850 RBC ANTIBODY SCREEN: CPT

## 2019-10-11 PROCEDURE — 92920 PRQ TRLUML C ANGIOP 1ART&/BR: CPT | Performed by: INTERNAL MEDICINE

## 2019-10-11 PROCEDURE — C1887 CATHETER, GUIDING: HCPCS

## 2019-10-11 PROCEDURE — 2709999900 HC NON-CHARGEABLE SUPPLY

## 2019-10-11 PROCEDURE — 36415 COLL VENOUS BLD VENIPUNCTURE: CPT

## 2019-10-11 PROCEDURE — C1725 CATH, TRANSLUMIN NON-LASER: HCPCS

## 2019-10-11 PROCEDURE — 93455 CORONARY ART/GRFT ANGIO S&I: CPT | Performed by: INTERNAL MEDICINE

## 2019-10-11 PROCEDURE — C1894 INTRO/SHEATH, NON-LASER: HCPCS

## 2019-10-11 PROCEDURE — 2580000003 HC RX 258: Performed by: INTERNAL MEDICINE

## 2019-10-11 PROCEDURE — 86900 BLOOD TYPING SEROLOGIC ABO: CPT

## 2019-10-11 PROCEDURE — 6360000002 HC RX W HCPCS

## 2019-10-11 PROCEDURE — G0378 HOSPITAL OBSERVATION PER HR: HCPCS

## 2019-10-11 RX ORDER — BISACODYL 10 MG
10 SUPPOSITORY, RECTAL RECTAL DAILY PRN
Status: DISCONTINUED | OUTPATIENT
Start: 2019-10-11 | End: 2019-10-12 | Stop reason: HOSPADM

## 2019-10-11 RX ORDER — SODIUM CHLORIDE 9 MG/ML
INJECTION, SOLUTION INTRAVENOUS CONTINUOUS
Status: DISCONTINUED | OUTPATIENT
Start: 2019-10-11 | End: 2019-10-11

## 2019-10-11 RX ORDER — FUROSEMIDE 20 MG/1
20 TABLET ORAL DAILY
Status: DISCONTINUED | OUTPATIENT
Start: 2019-10-12 | End: 2019-10-12 | Stop reason: HOSPADM

## 2019-10-11 RX ORDER — SENNA PLUS 8.6 MG/1
2 TABLET ORAL DAILY
Status: DISCONTINUED | OUTPATIENT
Start: 2019-10-11 | End: 2019-10-12 | Stop reason: HOSPADM

## 2019-10-11 RX ORDER — ACETAMINOPHEN 325 MG/1
650 TABLET ORAL EVERY 4 HOURS PRN
Status: DISCONTINUED | OUTPATIENT
Start: 2019-10-11 | End: 2019-10-12 | Stop reason: HOSPADM

## 2019-10-11 RX ORDER — NITROGLYCERIN 0.4 MG/1
0.4 TABLET SUBLINGUAL EVERY 5 MIN PRN
Status: DISCONTINUED | OUTPATIENT
Start: 2019-10-11 | End: 2019-10-12 | Stop reason: HOSPADM

## 2019-10-11 RX ORDER — POLYETHYLENE GLYCOL 3350 17 G/17G
17 POWDER, FOR SOLUTION ORAL DAILY
Status: DISCONTINUED | OUTPATIENT
Start: 2019-10-11 | End: 2019-10-12 | Stop reason: HOSPADM

## 2019-10-11 RX ORDER — ACETAMINOPHEN 325 MG/1
650 TABLET ORAL EVERY 4 HOURS PRN
Status: DISCONTINUED | OUTPATIENT
Start: 2019-10-11 | End: 2019-10-11 | Stop reason: SDUPTHER

## 2019-10-11 RX ORDER — GUAIFENESIN/DEXTROMETHORPHAN 100-10MG/5
10 SYRUP ORAL EVERY 4 HOURS PRN
Status: DISCONTINUED | OUTPATIENT
Start: 2019-10-11 | End: 2019-10-12 | Stop reason: HOSPADM

## 2019-10-11 RX ORDER — LORATADINE 10 MG/1
10 TABLET ORAL DAILY
COMMUNITY
End: 2021-06-28 | Stop reason: SDUPTHER

## 2019-10-11 RX ORDER — SODIUM CHLORIDE 9 MG/ML
INJECTION, SOLUTION INTRAVENOUS CONTINUOUS
Status: DISCONTINUED | OUTPATIENT
Start: 2019-10-11 | End: 2019-10-12 | Stop reason: HOSPADM

## 2019-10-11 RX ORDER — SODIUM CHLORIDE 9 MG/ML
INJECTION, SOLUTION INTRAVENOUS CONTINUOUS
Status: DISCONTINUED | OUTPATIENT
Start: 2019-10-11 | End: 2019-10-11 | Stop reason: SDUPTHER

## 2019-10-11 RX ORDER — ASPIRIN 81 MG/1
81 TABLET ORAL DAILY
Status: DISCONTINUED | OUTPATIENT
Start: 2019-10-12 | End: 2019-10-12 | Stop reason: HOSPADM

## 2019-10-11 RX ORDER — ALPRAZOLAM 0.25 MG/1
0.5 TABLET ORAL 4 TIMES DAILY PRN
Status: DISCONTINUED | OUTPATIENT
Start: 2019-10-11 | End: 2019-10-12 | Stop reason: HOSPADM

## 2019-10-11 RX ORDER — PANTOPRAZOLE SODIUM 40 MG/1
40 TABLET, DELAYED RELEASE ORAL
Status: DISCONTINUED | OUTPATIENT
Start: 2019-10-12 | End: 2019-10-12 | Stop reason: HOSPADM

## 2019-10-11 RX ADMIN — SODIUM CHLORIDE: 9 INJECTION, SOLUTION INTRAVENOUS at 12:50

## 2019-10-11 RX ADMIN — ACETAMINOPHEN 650 MG: 325 TABLET, FILM COATED ORAL at 23:24

## 2019-10-11 RX ADMIN — METOPROLOL TARTRATE 25 MG: 25 TABLET ORAL at 23:24

## 2019-10-11 RX ADMIN — ALPRAZOLAM 0.5 MG: 0.25 TABLET ORAL at 18:17

## 2019-10-11 RX ADMIN — ACETAMINOPHEN 650 MG: 325 TABLET, FILM COATED ORAL at 18:16

## 2019-10-11 ASSESSMENT — PAIN DESCRIPTION - ONSET
ONSET: GRADUAL
ONSET: GRADUAL

## 2019-10-11 ASSESSMENT — PAIN DESCRIPTION - PAIN TYPE
TYPE: ACUTE PAIN
TYPE: ACUTE PAIN;CHRONIC PAIN

## 2019-10-11 ASSESSMENT — PAIN DESCRIPTION - ORIENTATION
ORIENTATION: RIGHT
ORIENTATION: LOWER

## 2019-10-11 ASSESSMENT — PAIN DESCRIPTION - FREQUENCY: FREQUENCY: CONTINUOUS

## 2019-10-11 ASSESSMENT — PAIN SCALES - GENERAL
PAINLEVEL_OUTOF10: 0
PAINLEVEL_OUTOF10: 6
PAINLEVEL_OUTOF10: 6
PAINLEVEL_OUTOF10: 8

## 2019-10-11 ASSESSMENT — PAIN DESCRIPTION - LOCATION
LOCATION: BACK;HIP
LOCATION: GROIN

## 2019-10-11 ASSESSMENT — PAIN DESCRIPTION - PROGRESSION: CLINICAL_PROGRESSION: GRADUALLY IMPROVING

## 2019-10-11 ASSESSMENT — PAIN DESCRIPTION - DESCRIPTORS
DESCRIPTORS: ACHING
DESCRIPTORS: ACHING

## 2019-10-12 VITALS
DIASTOLIC BLOOD PRESSURE: 61 MMHG | HEIGHT: 63 IN | OXYGEN SATURATION: 97 % | WEIGHT: 209 LBS | SYSTOLIC BLOOD PRESSURE: 105 MMHG | BODY MASS INDEX: 37.03 KG/M2 | TEMPERATURE: 98 F | RESPIRATION RATE: 16 BRPM | HEART RATE: 63 BPM

## 2019-10-12 LAB
ANION GAP SERPL CALCULATED.3IONS-SCNC: 11 MMOL/L (ref 7–16)
BUN BLDV-MCNC: 15 MG/DL (ref 8–23)
CALCIUM SERPL-MCNC: 9.3 MG/DL (ref 8.6–10.2)
CHLORIDE BLD-SCNC: 105 MMOL/L (ref 98–107)
CO2: 23 MMOL/L (ref 22–29)
CREAT SERPL-MCNC: 0.6 MG/DL (ref 0.5–1)
GFR AFRICAN AMERICAN: >60
GFR NON-AFRICAN AMERICAN: >60 ML/MIN/1.73
GLUCOSE BLD-MCNC: 110 MG/DL (ref 74–99)
HCT VFR BLD CALC: 37.9 % (ref 34–48)
HEMOGLOBIN: 12.1 G/DL (ref 11.5–15.5)
MCH RBC QN AUTO: 30.3 PG (ref 26–35)
MCHC RBC AUTO-ENTMCNC: 31.9 % (ref 32–34.5)
MCV RBC AUTO: 95 FL (ref 80–99.9)
PDW BLD-RTO: 13 FL (ref 11.5–15)
PLATELET # BLD: 185 E9/L (ref 130–450)
PMV BLD AUTO: 10.1 FL (ref 7–12)
POTASSIUM SERPL-SCNC: 4.1 MMOL/L (ref 3.5–5)
RBC # BLD: 3.99 E12/L (ref 3.5–5.5)
SODIUM BLD-SCNC: 139 MMOL/L (ref 132–146)
WBC # BLD: 6.3 E9/L (ref 4.5–11.5)

## 2019-10-12 PROCEDURE — 6370000000 HC RX 637 (ALT 250 FOR IP): Performed by: NURSE PRACTITIONER

## 2019-10-12 PROCEDURE — G0378 HOSPITAL OBSERVATION PER HR: HCPCS

## 2019-10-12 PROCEDURE — 80048 BASIC METABOLIC PNL TOTAL CA: CPT

## 2019-10-12 PROCEDURE — 85027 COMPLETE CBC AUTOMATED: CPT

## 2019-10-12 PROCEDURE — 6370000000 HC RX 637 (ALT 250 FOR IP): Performed by: INTERNAL MEDICINE

## 2019-10-12 PROCEDURE — 36415 COLL VENOUS BLD VENIPUNCTURE: CPT

## 2019-10-12 RX ORDER — ISOSORBIDE MONONITRATE 30 MG/1
30 TABLET, EXTENDED RELEASE ORAL DAILY
Status: DISCONTINUED | OUTPATIENT
Start: 2019-10-12 | End: 2019-10-12 | Stop reason: HOSPADM

## 2019-10-12 RX ORDER — ASPIRIN 81 MG/1
81 TABLET ORAL DAILY
Qty: 30 TABLET | Refills: 3 | Status: CANCELLED | OUTPATIENT
Start: 2019-10-13

## 2019-10-12 RX ORDER — ISOSORBIDE MONONITRATE 30 MG/1
30 TABLET, EXTENDED RELEASE ORAL DAILY
Qty: 30 TABLET | Refills: 3 | Status: SHIPPED | OUTPATIENT
Start: 2019-10-12 | End: 2021-05-11 | Stop reason: SDUPTHER

## 2019-10-12 RX ADMIN — ASPIRIN 81 MG: 81 TABLET ORAL at 09:03

## 2019-10-12 RX ADMIN — ACETAMINOPHEN 650 MG: 325 TABLET, FILM COATED ORAL at 11:32

## 2019-10-12 RX ADMIN — ISOSORBIDE MONONITRATE 30 MG: 30 TABLET, EXTENDED RELEASE ORAL at 13:21

## 2019-10-12 RX ADMIN — FUROSEMIDE 20 MG: 20 TABLET ORAL at 09:03

## 2019-10-12 RX ADMIN — METOPROLOL TARTRATE 25 MG: 25 TABLET ORAL at 09:03

## 2019-10-12 RX ADMIN — PANTOPRAZOLE SODIUM 40 MG: 40 TABLET, DELAYED RELEASE ORAL at 05:40

## 2019-10-12 ASSESSMENT — PAIN SCALES - GENERAL
PAINLEVEL_OUTOF10: 8
PAINLEVEL_OUTOF10: 0

## 2019-12-16 ENCOUNTER — HOSPITAL ENCOUNTER (OUTPATIENT)
Dept: CARDIAC REHAB | Age: 79
Setting detail: THERAPIES SERIES
Discharge: HOME | End: 2019-12-16
Payer: MEDICARE

## 2019-12-20 ENCOUNTER — HOSPITAL ENCOUNTER (OUTPATIENT)
Dept: CARDIAC REHAB | Age: 79
Setting detail: THERAPIES SERIES
Discharge: HOME | End: 2019-12-20
Payer: MEDICARE

## 2019-12-20 PROCEDURE — 93798 PHYS/QHP OP CAR RHAB W/ECG: CPT

## 2019-12-27 ENCOUNTER — HOSPITAL ENCOUNTER (OUTPATIENT)
Dept: CARDIAC REHAB | Age: 79
Setting detail: THERAPIES SERIES
Discharge: HOME | End: 2019-12-27
Payer: MEDICARE

## 2019-12-27 PROCEDURE — 93798 PHYS/QHP OP CAR RHAB W/ECG: CPT

## 2019-12-30 ENCOUNTER — HOSPITAL ENCOUNTER (OUTPATIENT)
Dept: CARDIAC REHAB | Age: 79
Setting detail: THERAPIES SERIES
Discharge: HOME | End: 2019-12-30
Payer: MEDICARE

## 2019-12-30 PROCEDURE — 93798 PHYS/QHP OP CAR RHAB W/ECG: CPT

## 2020-01-03 ENCOUNTER — HOSPITAL ENCOUNTER (OUTPATIENT)
Dept: CARDIAC REHAB | Age: 80
Setting detail: THERAPIES SERIES
Discharge: HOME | End: 2020-01-03
Payer: MEDICARE

## 2020-01-03 PROCEDURE — 93798 PHYS/QHP OP CAR RHAB W/ECG: CPT

## 2020-01-06 ENCOUNTER — HOSPITAL ENCOUNTER (OUTPATIENT)
Dept: CARDIAC REHAB | Age: 80
Setting detail: THERAPIES SERIES
Discharge: HOME | End: 2020-01-06
Payer: MEDICARE

## 2020-01-06 PROCEDURE — 93798 PHYS/QHP OP CAR RHAB W/ECG: CPT

## 2020-01-08 ENCOUNTER — HOSPITAL ENCOUNTER (OUTPATIENT)
Dept: CARDIAC REHAB | Age: 80
Setting detail: THERAPIES SERIES
Discharge: HOME | End: 2020-01-08
Payer: MEDICARE

## 2020-01-08 PROCEDURE — 93798 PHYS/QHP OP CAR RHAB W/ECG: CPT

## 2020-01-10 ENCOUNTER — HOSPITAL ENCOUNTER (OUTPATIENT)
Dept: CARDIAC REHAB | Age: 80
Setting detail: THERAPIES SERIES
Discharge: HOME | End: 2020-01-10
Payer: MEDICARE

## 2020-01-10 PROCEDURE — 93798 PHYS/QHP OP CAR RHAB W/ECG: CPT

## 2020-01-13 ENCOUNTER — HOSPITAL ENCOUNTER (OUTPATIENT)
Dept: CARDIAC REHAB | Age: 80
Setting detail: THERAPIES SERIES
Discharge: HOME | End: 2020-01-13
Payer: MEDICARE

## 2020-01-13 PROCEDURE — 93798 PHYS/QHP OP CAR RHAB W/ECG: CPT

## 2020-01-15 ENCOUNTER — HOSPITAL ENCOUNTER (OUTPATIENT)
Dept: CARDIAC REHAB | Age: 80
Setting detail: THERAPIES SERIES
Discharge: HOME | End: 2020-01-15
Payer: MEDICARE

## 2020-01-15 PROCEDURE — 93798 PHYS/QHP OP CAR RHAB W/ECG: CPT

## 2020-01-20 ENCOUNTER — HOSPITAL ENCOUNTER (OUTPATIENT)
Dept: CARDIAC REHAB | Age: 80
Setting detail: THERAPIES SERIES
Discharge: HOME | End: 2020-01-20
Payer: MEDICARE

## 2020-01-20 PROCEDURE — 93798 PHYS/QHP OP CAR RHAB W/ECG: CPT

## 2020-01-20 ASSESSMENT — PATIENT HEALTH QUESTIONNAIRE - PHQ9: SUM OF ALL RESPONSES TO PHQ QUESTIONS 1-9: 7

## 2020-01-22 ENCOUNTER — HOSPITAL ENCOUNTER (OUTPATIENT)
Dept: CARDIAC REHAB | Age: 80
Setting detail: THERAPIES SERIES
Discharge: HOME | End: 2020-01-22
Payer: MEDICARE

## 2020-01-22 PROCEDURE — 93798 PHYS/QHP OP CAR RHAB W/ECG: CPT

## 2020-01-24 ENCOUNTER — HOSPITAL ENCOUNTER (OUTPATIENT)
Dept: CARDIAC REHAB | Age: 80
Setting detail: THERAPIES SERIES
Discharge: HOME | End: 2020-01-24
Payer: MEDICARE

## 2020-01-24 PROCEDURE — 93798 PHYS/QHP OP CAR RHAB W/ECG: CPT

## 2020-01-27 ENCOUNTER — HOSPITAL ENCOUNTER (OUTPATIENT)
Dept: CARDIAC REHAB | Age: 80
Setting detail: THERAPIES SERIES
Discharge: HOME | End: 2020-01-27
Payer: MEDICARE

## 2020-01-27 PROCEDURE — 93798 PHYS/QHP OP CAR RHAB W/ECG: CPT

## 2020-01-29 ENCOUNTER — HOSPITAL ENCOUNTER (OUTPATIENT)
Dept: CARDIAC REHAB | Age: 80
Setting detail: THERAPIES SERIES
Discharge: HOME | End: 2020-01-29
Payer: MEDICARE

## 2020-01-29 PROCEDURE — 93798 PHYS/QHP OP CAR RHAB W/ECG: CPT

## 2020-01-31 ENCOUNTER — HOSPITAL ENCOUNTER (OUTPATIENT)
Dept: CARDIAC REHAB | Age: 80
Setting detail: THERAPIES SERIES
Discharge: HOME | End: 2020-01-31
Payer: MEDICARE

## 2020-01-31 PROCEDURE — 93798 PHYS/QHP OP CAR RHAB W/ECG: CPT

## 2020-02-03 ENCOUNTER — HOSPITAL ENCOUNTER (OUTPATIENT)
Dept: CARDIAC REHAB | Age: 80
Setting detail: THERAPIES SERIES
Discharge: HOME | End: 2020-02-03
Payer: MEDICARE

## 2020-02-03 PROCEDURE — 93798 PHYS/QHP OP CAR RHAB W/ECG: CPT

## 2020-02-05 ENCOUNTER — HOSPITAL ENCOUNTER (OUTPATIENT)
Dept: CARDIAC REHAB | Age: 80
Setting detail: THERAPIES SERIES
Discharge: HOME | End: 2020-02-05
Payer: MEDICARE

## 2020-02-05 PROCEDURE — 93798 PHYS/QHP OP CAR RHAB W/ECG: CPT

## 2020-02-07 ENCOUNTER — HOSPITAL ENCOUNTER (OUTPATIENT)
Dept: CARDIAC REHAB | Age: 80
Setting detail: THERAPIES SERIES
End: 2020-02-07
Payer: MEDICARE

## 2020-02-10 ENCOUNTER — HOSPITAL ENCOUNTER (OUTPATIENT)
Dept: CARDIAC REHAB | Age: 80
Setting detail: THERAPIES SERIES
Discharge: HOME | End: 2020-02-10
Payer: MEDICARE

## 2020-02-10 PROCEDURE — 93798 PHYS/QHP OP CAR RHAB W/ECG: CPT

## 2020-02-10 ASSESSMENT — PATIENT HEALTH QUESTIONNAIRE - PHQ9: SUM OF ALL RESPONSES TO PHQ QUESTIONS 1-9: 0

## 2020-02-12 ENCOUNTER — HOSPITAL ENCOUNTER (OUTPATIENT)
Dept: CARDIAC REHAB | Age: 80
Setting detail: THERAPIES SERIES
Discharge: HOME | End: 2020-02-12
Payer: MEDICARE

## 2020-02-12 PROCEDURE — 93798 PHYS/QHP OP CAR RHAB W/ECG: CPT

## 2020-02-13 ENCOUNTER — TELEPHONE (OUTPATIENT)
Dept: CARDIOLOGY CLINIC | Age: 80
End: 2020-02-13

## 2020-02-13 NOTE — TELEPHONE ENCOUNTER
DR [facility] (SHE ACTUALLY SEEN DR BAI IN [place] [id number] WHO RECOMMENDED CATH)    SHE IS NOT HAPPY WITH DR JOHNSON AND ASKING IF SHE CAN BE SEEN BY YOU

## 2020-02-14 ENCOUNTER — HOSPITAL ENCOUNTER (OUTPATIENT)
Dept: CARDIAC REHAB | Age: 80
Setting detail: THERAPIES SERIES
Discharge: HOME | End: 2020-02-14
Payer: MEDICARE

## 2020-02-14 PROCEDURE — 93798 PHYS/QHP OP CAR RHAB W/ECG: CPT

## 2020-02-17 ENCOUNTER — HOSPITAL ENCOUNTER (OUTPATIENT)
Dept: CARDIAC REHAB | Age: 80
Setting detail: THERAPIES SERIES
Discharge: HOME | End: 2020-02-17
Payer: MEDICARE

## 2020-02-17 PROCEDURE — 93798 PHYS/QHP OP CAR RHAB W/ECG: CPT

## 2020-02-19 ENCOUNTER — HOSPITAL ENCOUNTER (OUTPATIENT)
Dept: CARDIAC REHAB | Age: 80
Setting detail: THERAPIES SERIES
Discharge: HOME | End: 2020-02-19
Payer: MEDICARE

## 2020-02-19 PROCEDURE — 93798 PHYS/QHP OP CAR RHAB W/ECG: CPT

## 2020-02-21 ENCOUNTER — HOSPITAL ENCOUNTER (OUTPATIENT)
Dept: CARDIAC REHAB | Age: 80
Setting detail: THERAPIES SERIES
Discharge: HOME | End: 2020-02-21
Payer: MEDICARE

## 2020-02-21 PROCEDURE — 93798 PHYS/QHP OP CAR RHAB W/ECG: CPT

## 2020-02-24 ENCOUNTER — HOSPITAL ENCOUNTER (OUTPATIENT)
Dept: CARDIAC REHAB | Age: 80
Setting detail: THERAPIES SERIES
Discharge: HOME | End: 2020-02-24
Payer: MEDICARE

## 2020-02-24 PROCEDURE — 93798 PHYS/QHP OP CAR RHAB W/ECG: CPT

## 2020-02-26 ENCOUNTER — HOSPITAL ENCOUNTER (OUTPATIENT)
Dept: CARDIAC REHAB | Age: 80
Setting detail: THERAPIES SERIES
Discharge: HOME | End: 2020-02-26
Payer: MEDICARE

## 2020-02-26 PROCEDURE — 93798 PHYS/QHP OP CAR RHAB W/ECG: CPT

## 2020-02-28 ENCOUNTER — HOSPITAL ENCOUNTER (OUTPATIENT)
Dept: CARDIAC REHAB | Age: 80
Setting detail: THERAPIES SERIES
Discharge: HOME | End: 2020-02-28
Payer: MEDICARE

## 2020-02-28 PROCEDURE — 93798 PHYS/QHP OP CAR RHAB W/ECG: CPT

## 2020-03-02 ENCOUNTER — HOSPITAL ENCOUNTER (OUTPATIENT)
Dept: CARDIAC REHAB | Age: 80
Setting detail: THERAPIES SERIES
Discharge: HOME | End: 2020-03-02
Payer: MEDICARE

## 2020-03-02 PROCEDURE — 93798 PHYS/QHP OP CAR RHAB W/ECG: CPT

## 2020-03-04 ENCOUNTER — HOSPITAL ENCOUNTER (OUTPATIENT)
Dept: CARDIAC REHAB | Age: 80
Setting detail: THERAPIES SERIES
Discharge: HOME | End: 2020-03-04
Payer: MEDICARE

## 2020-03-04 PROCEDURE — 93798 PHYS/QHP OP CAR RHAB W/ECG: CPT

## 2020-03-06 ENCOUNTER — HOSPITAL ENCOUNTER (OUTPATIENT)
Dept: CARDIAC REHAB | Age: 80
Setting detail: THERAPIES SERIES
Discharge: HOME | End: 2020-03-06
Payer: MEDICARE

## 2020-03-06 PROCEDURE — 93798 PHYS/QHP OP CAR RHAB W/ECG: CPT

## 2020-03-09 ENCOUNTER — HOSPITAL ENCOUNTER (OUTPATIENT)
Dept: CARDIAC REHAB | Age: 80
Setting detail: THERAPIES SERIES
Discharge: HOME | End: 2020-03-09
Payer: MEDICARE

## 2020-03-09 VITALS
WEIGHT: 209 LBS | HEIGHT: 63 IN | HEART RATE: 65 BPM | DIASTOLIC BLOOD PRESSURE: 80 MMHG | SYSTOLIC BLOOD PRESSURE: 140 MMHG | BODY MASS INDEX: 37.03 KG/M2

## 2020-03-09 PROCEDURE — 93798 PHYS/QHP OP CAR RHAB W/ECG: CPT

## 2020-03-09 ASSESSMENT — PATIENT HEALTH QUESTIONNAIRE - PHQ9: SUM OF ALL RESPONSES TO PHQ QUESTIONS 1-9: 2

## 2020-03-11 ENCOUNTER — HOSPITAL ENCOUNTER (OUTPATIENT)
Dept: CARDIAC REHAB | Age: 80
Setting detail: THERAPIES SERIES
Discharge: HOME | End: 2020-03-11
Payer: MEDICARE

## 2020-03-11 PROCEDURE — 93798 PHYS/QHP OP CAR RHAB W/ECG: CPT

## 2020-03-11 NOTE — PROGRESS NOTES
[facility] Cardiology Progress Note  Dr. Yesenia Mariscal      Referring Physician: Sharonda Howell MD  CHIEF COMPLAINT:   Chief Complaint   Patient presents with    Coronary Artery Disease     6 month follow. Patient denies any cp sob or dizziness. HISTORY OF PRESENT ILLNESS:    Patient is 67year old female with history of coronary artery disease, hyperlipidemia, diabetes mellitus, GERD, diastolic CHF, pulmonary embolism, referred to Cardiology for evaluation prior to cath. Patient was recently admitted to the hospital and had coronary CTA that she occlusion of 2 bypasses grafts, patient's having shortness of breath with exertion, the chest pain, denies any palpitations, no pedal edema, no PND, no orthopnea, no syncope, no presyncopal episodes. Past Medical History:   Diagnosis Date    Arthritis     shoulders, hips and knees, fingers    Bronchitis     CAD (coronary artery disease)     some blockage, being treated with aspirin    GERD (gastroesophageal reflux disease)     Hyperlipidemia     Hypertension     Sinus disorder          Past Surgical History:   Procedure Laterality Date    APPENDECTOMY      APPENDECTOMY      BLADDER SURGERY      BLADDER SUSPENSION      CARDIAC SURGERY      CHOLECYSTECTOMY      CHOLECYSTECTOMY      COLONOSCOPY  11/29/12    diverticulosis, hemorrhoid    COLONOSCOPY  september 2015    CORONARY ANGIOPLASTY  10/11/2019    Angioplasty LAD by Dr Kian Corbett GRAFT  06/17/2019    Bypass x4.   H.  LIMB-LAD, SVG-OM1 SVG-OM2 SVG-RCA    CYST REMOVAL      back    DILATION AND CURETTAGE OF UTERUS      HYSTERECTOMY           Current Outpatient Medications   Medication Sig Dispense Refill    apixaban (ELIQUIS) 5 MG TABS tablet 10mgPO BID through 7/10, then 5mg BID 60 tablet 2    isosorbide mononitrate (IMDUR) 30 MG extended release tablet Take 1 tablet by mouth daily 30 tablet 3    loratadine (CLARITIN) 10 MG tablet Take 10 mg by mouth daily      furosemide (LASIX) 20 MG tablet Take 1 tablet by mouth daily 60 tablet 3    acetaminophen (TYLENOL) 325 MG tablet Take 650 mg by mouth every 4 hours as needed for Pain      bisacodyl (DULCOLAX) 10 MG suppository Place 10 mg rectally daily as needed for Constipation      polyethylene glycol (GLYCOLAX) powder Take 17 g by mouth daily      senna (SENOKOT) 8.6 MG tablet Take 2 tablets by mouth daily      magnesium hydroxide (MILK OF MAGNESIA) 400 MG/5ML suspension Take 30 mLs by mouth daily as needed for Constipation      nitroGLYCERIN (NITROSTAT) 0.4 MG SL tablet Place 0.4 mg under the tongue every 5 minutes as needed for Chest pain up to max of 3 total doses. If no relief after 1 dose, call 911.  Dextromethorphan-guaiFENesin  MG/5ML SYRP Take 10 mLs by mouth every 4 hours as needed for Cough      metoprolol tartrate (LOPRESSOR) 25 MG tablet Take 25 mg by mouth 2 times daily      aspirin 81 MG tablet Take 81 mg by mouth daily Last dose 9/4 2015      omeprazole (PRILOSEC) 10 MG capsule Take 20 mg by mouth daily       Evolocumab 140 MG/ML SOAJ 1 SC EVERY 2 WEEKS FOR CHOLESTEROL 2 mL 3     No current facility-administered medications for this visit. Allergies as of 03/13/2020 - Review Complete 03/13/2020   Allergen Reaction Noted    Statins [statins] Swelling and Rash 11/28/2012    Adhesive tape  09/09/2015    Duricef [cefadroxil]  11/28/2012    Gabapentin  11/28/2012    Oxaprozin  11/28/2012    Sulfa antibiotics Rash 09/09/2015       Social History     Socioeconomic History    Marital status:       Spouse name: Not on file    Number of children: Not on file    Years of education: Not on file    Highest education level: Not on file   Occupational History    Not on file   Social Needs    Financial resource strain: Not on file    Food insecurity     Worry: Not on file     Inability: Not on file    Transportation needs     Medical: Not on file     Non-medical: Not on file Tobacco Use    Smoking status: Former Smoker    Smokeless tobacco: Never Used   Substance and Sexual Activity    Alcohol use: No     Comment: 1 cup of coffee a day     Drug use: No    Sexual activity: Not on file   Lifestyle    Physical activity     Days per week: Not on file     Minutes per session: Not on file    Stress: Not on file   Relationships    Social connections     Talks on phone: Not on file     Gets together: Not on file     Attends Yazidism service: Not on file     Active member of club or organization: Not on file     Attends meetings of clubs or organizations: Not on file     Relationship status: Not on file    Intimate partner violence     Fear of current or ex partner: Not on file     Emotionally abused: Not on file     Physically abused: Not on file     Forced sexual activity: Not on file   Other Topics Concern    Not on file   Social History Narrative    Not on file       Family History   Problem Relation Age of Onset    Cancer Mother     Stroke Father        REVIEW OF SYSTEMS:     CONSTITUTIONAL:  negative for  fevers, chills, sweats and fatigue  HEENT:  negative for  tinnitus, earaches, nasal congestion and epistaxis  RESPIRATORY:  negative for  dry cough, cough with sputum, dyspnea, wheezing and hemoptysis  GASTROINTESTINAL:  negative for nausea, vomiting, diarrhea, constipation, pruritus and jaundice  HEMATOLOGIC/LYMPHATIC:  negative for easy bruising, bleeding, lymphadenopathy and petechiae  ENDOCRINE:  negative for heat intolerance, cold intolerance, tremor, hair loss and diabetic symptoms including neither polyuria nor polydipsia nor blurred vision  MUSCULOSKELETAL:  negative for  myalgias, arthralgias, joint swelling, stiff joints and decreased range of motion  NEUROLOGICAL:  negative for memory problems, speech problems, visual disturbance, dysphagia, weakness and numbness      PHYSICAL EXAM:   CONSTITUTIONAL:  awake, alert, cooperative, no apparent distress, and appears stated age  EYES:  Anicteric sclerae, lids and lashes normal and pupils equal, round and reactive to light  HEENT:  Moist and pink mucous membranes, normocephalic, without obvious abnormality, atraumatic  NECK:  Supple, symmetrical, trachea midline, no adenopathy, thyroid symmetric, not enlarged and no tenderness, skin normal  HEMATOLOGIC/LYMPHATICS:  no cervical lymphadenopathy and no supraclavicular lymphadenopathy  LUNGS:  No increased work of breathing, good air exchange, clear to auscultation bilaterally, no crackles or wheezing  CARDIOVASCULAR:  Normal apical impulse, regular rate and rhythm, normal S1 and S2, no S3 or S4,no murmur,and no pedal edema, no carotid bruit, no JVD, no pulsating masses. ABDOMEN:soft, nontender, no hepatomegaly, no splenomegaly, bowel sounds positive. CHEST:expands symmetrically, nontender to palpation  MUSCULOSKELETAL:  No clubbing, no cyanosis, no pedal edema,there is no redness, warmth, or swelling of the joints  full range of motion noted. NEUROLOGIC:  Alert, awake, oriented ×3. SKIN: no bruises, no bleeding, normal skin color, texture, turgor and no redness, warmth, or swelling      /64 (Site: Right Upper Arm, Position: Sitting, Cuff Size: Large Adult)   Pulse 68   Ht 5' 3\" (1.6 m)   Wt 220 lb (99.8 kg)   BMI 38.97 kg/m²     DATA:   I personally reviewed the visit EKG with the following interpretation: Sinus rhythm, old anteroseptal wall MI age undetermined    7/29/19 Sinus rhythm 1st degree AV block Leftward axis Probable inferior infarct Consider anterolateral infarct    ECHO: 7/3/19 Summary   Left ventricle grossly normal in size. Normal LV segmental wall motion. Normal left ventricular wall thickness. Estimated left ventricular ejection fraction is 67±5%. <50% criteria for diastolic dysfunction. The LAESV Index is <34ml/m2. Normal right ventricular size and function   Physiologic and/or trace mitral regurgitation is present.    The tricuspid valve 4.  Hyperlipidemia, unspecified :  not on statin due to intolerance   5. Personal history of pulmonary embolism   6. Long term (current) use of anticoagulants    RECOMMENDATIONS:   1. Continue current treatment  2. Continue cardiac rehab  3. Preventive cardiology: Low-salt, low-cholesterol diet, daily exercise, total cholesterol of less than 200, LDL of less than 70, were all advised. 4.  Follow-up with Dr. Viktoriya Childers as scheduled  5. Follow-up with Dr. Chepe Galarza in 6 months, sooner if symptomatic for any reason    I have reviewed my findings and recommendations with patient    Electronically signed by Jess Hess MD on 3/30/2020 at 7:50 PM  NOTE: This report was transcribed using voice recognition software.  Every effort was made to ensure accuracy; however, inadvertent computerized transcription errors may be present

## 2020-03-13 ENCOUNTER — OFFICE VISIT (OUTPATIENT)
Dept: CARDIOLOGY CLINIC | Age: 80
End: 2020-03-13
Payer: MEDICARE

## 2020-03-13 VITALS
WEIGHT: 220 LBS | BODY MASS INDEX: 38.98 KG/M2 | HEIGHT: 63 IN | HEART RATE: 68 BPM | DIASTOLIC BLOOD PRESSURE: 64 MMHG | SYSTOLIC BLOOD PRESSURE: 128 MMHG

## 2020-03-13 PROCEDURE — 99214 OFFICE O/P EST MOD 30 MIN: CPT | Performed by: INTERNAL MEDICINE

## 2020-03-13 PROCEDURE — 93000 ELECTROCARDIOGRAM COMPLETE: CPT | Performed by: INTERNAL MEDICINE

## 2020-03-20 ENCOUNTER — TELEPHONE (OUTPATIENT)
Dept: CARDIAC REHAB | Age: 80
End: 2020-03-20

## 2020-03-20 NOTE — TELEPHONE ENCOUNTER
Spoke with patient. Patient state she is fine. Will follow up with PT during this rehab downtime. Advised PT to call us with any questions or concerns.

## 2020-04-06 ENCOUNTER — TELEPHONE (OUTPATIENT)
Dept: CARDIAC REHAB | Age: 80
End: 2020-04-06

## 2020-04-06 NOTE — TELEPHONE ENCOUNTER
Spoke with patient. Patient state she is fine. Doing some walking and doing well. Can't wait for rehab to resume. Will follow up with PT during this rehab downtime. Advised PT to call us with any questions or concerns.

## 2020-05-28 LAB
AVERAGE GLUCOSE: NORMAL
HBA1C MFR BLD: 6 %

## 2020-06-02 ENCOUNTER — TELEPHONE (OUTPATIENT)
Dept: CARDIAC REHAB | Age: 80
End: 2020-06-02

## 2020-06-02 NOTE — TELEPHONE ENCOUNTER
Informed patient regarding the reopening of Cardiac Rehabilitation and new policies/procedures. Patient denies any symptoms of cough, SOB, and fever within the last 3 days. Plans to return to rehab on 6/8/20. Will reassess patients exercise needs upon restart. Advised to call staff at [phone] with any questions/concerns.

## 2020-06-18 ENCOUNTER — TELEPHONE (OUTPATIENT)
Dept: CARDIOLOGY CLINIC | Age: 80
End: 2020-06-18

## 2020-06-22 RX ORDER — ALIROCUMAB 75 MG/ML
75 INJECTION, SOLUTION SUBCUTANEOUS
Qty: 1.96 ML | Refills: 3 | Status: SHIPPED
Start: 2020-06-22 | End: 2020-09-22

## 2020-09-21 NOTE — PROGRESS NOTES
nostril as directed      pantoprazole (PROTONIX) 40 MG tablet Take 40 mg by mouth daily      aspirin 325 MG tablet Take 325 mg by mouth daily      ibuprofen (ADVIL;MOTRIN) 200 MG tablet Take 200 mg by mouth every 6 hours as needed for Pain      Phenylephrine-Ibuprofen (ADVIL SINUS CONGESTION & PAIN PO) Take 1 tablet by mouth as needed      Evolocumab 140 MG/ML SOAJ 1 SC EVERY 2 WEEKS FOR CHOLESTEROL 2 mL 3    isosorbide mononitrate (IMDUR) 30 MG extended release tablet Take 1 tablet by mouth daily 30 tablet 3    loratadine (CLARITIN) 10 MG tablet Take 10 mg by mouth daily      furosemide (LASIX) 20 MG tablet Take 1 tablet by mouth daily 60 tablet 3    senna (SENOKOT) 8.6 MG tablet Take 2 tablets by mouth daily      nitroGLYCERIN (NITROSTAT) 0.4 MG SL tablet Place 0.4 mg under the tongue every 5 minutes as needed for Chest pain up to max of 3 total doses. If no relief after 1 dose, call 911.  Dextromethorphan-guaiFENesin  MG/5ML SYRP Take 10 mLs by mouth every 4 hours as needed for Cough       No current facility-administered medications for this visit. Allergies as of 09/22/2020 - Review Complete 09/22/2020   Allergen Reaction Noted    Statins [statins] Swelling and Rash 11/28/2012    Adhesive tape  09/09/2015    Duricef [cefadroxil]  11/28/2012    Gabapentin  11/28/2012    Oxaprozin  11/28/2012    Sulfa antibiotics Rash 09/09/2015       Social History     Socioeconomic History    Marital status:       Spouse name: Not on file    Number of children: Not on file    Years of education: Not on file    Highest education level: Not on file   Occupational History    Not on file   Social Needs    Financial resource strain: Not on file    Food insecurity     Worry: Not on file     Inability: Not on file    Transportation needs     Medical: Not on file     Non-medical: Not on file   Tobacco Use    Smoking status: Former Smoker    Smokeless tobacco: Never Used   Substance and Sexual Activity    Alcohol use: No     Comment: 2 cup of coffee a day     Drug use: No    Sexual activity: Not on file   Lifestyle    Physical activity     Days per week: Not on file     Minutes per session: Not on file    Stress: Not on file   Relationships    Social connections     Talks on phone: Not on file     Gets together: Not on file     Attends [employer] service: Not on file     Active member of club or organization: Not on file     Attends meetings of clubs or organizations: Not on file     Relationship status: Not on file    Intimate partner violence     Fear of current or ex partner: Not on file     Emotionally abused: Not on file     Physically abused: Not on file     Forced sexual activity: Not on file   Other Topics Concern    Not on file   Social History Narrative    Not on file       Family History   Problem Relation Age of Onset    Cancer Mother     Stroke Father        REVIEW OF SYSTEMS:     CONSTITUTIONAL:  negative for  fevers, chills, sweats and fatigue  HEENT:  negative for  tinnitus, earaches, nasal congestion and epistaxis  RESPIRATORY:  negative for  dry cough, cough with sputum, wheezing and hemoptysis  GASTROINTESTINAL:  negative for nausea, vomiting, diarrhea, constipation, pruritus and jaundice  HEMATOLOGIC/LYMPHATIC:  negative for easy bruising, bleeding, lymphadenopathy and petechiae  ENDOCRINE:  negative for heat intolerance, cold intolerance, tremor, hair loss and diabetic symptoms including neither polyuria nor polydipsia nor blurred vision  MUSCULOSKELETAL:  negative for  myalgias, arthralgias, joint swelling, stiff joints and decreased range of motion  NEUROLOGICAL:  negative for memory problems, speech problems, visual disturbance, dysphagia, weakness and numbness      PHYSICAL EXAM:   CONSTITUTIONAL:  awake, alert, cooperative, no apparent distress, and appears stated age  EYES:  Anicteric sclerae, lids and lashes normal and pupils equal, round and reactive to light  HEENT:  Moist and pink mucous membranes, normocephalic, without obvious abnormality, atraumatic  NECK:  Supple, symmetrical, trachea midline, no adenopathy, thyroid symmetric, not enlarged and no tenderness, skin normal  HEMATOLOGIC/LYMPHATICS:  no cervical lymphadenopathy and no supraclavicular lymphadenopathy  LUNGS:  No increased work of breathing, good air exchange, clear to auscultation bilaterally, no crackles or wheezing  CARDIOVASCULAR:  Normal apical impulse, regular rate and rhythm, normal S1 and S2, no S3 or S4,no murmur,and no pedal edema, no carotid bruit, no JVD, no pulsating masses. ABDOMEN:soft, nontender, no hepatomegaly, no splenomegaly, bowel sounds positive. CHEST:expands symmetrically, nontender to palpation  MUSCULOSKELETAL:  No clubbing, no cyanosis, no pedal edema,there is no redness, warmth, or swelling of the joints  full range of motion noted. NEUROLOGIC:  Alert, awake, oriented ×3. SKIN: no bruises, no bleeding, normal skin color, texture, turgor and no redness, warmth, or swelling      /78 (Site: Right Upper Arm, Position: Sitting, Cuff Size: Medium Adult)   Pulse 65   Ht 5' 3\" (1.6 m)   Wt 223 lb (101.2 kg)   BMI 39.50 kg/m²     DATA:   I personally reviewed the visit EKG with the following interpretation: Sinus rhythm, low QRS voltage with a poor R wave progression nonspecific ST changes in the lateral leads    EKG 3/13/20  Sinus rhythm, old anteroseptal wall MI age undetermined    ECHO: 7/3/19 Summary   Left ventricle grossly normal in size. Normal LV segmental wall motion. Normal left ventricular wall thickness. Estimated left ventricular ejection fraction is 67±5%. <50% criteria for diastolic dysfunction. The LAESV Index is <34ml/m2. Normal right ventricular size and function   Physiologic and/or trace mitral regurgitation is present. The tricuspid valve appears structurally normal.   RVSP is 30 mmHg.    Physiologic and/or trace tricuspid regurgitation. There is a small pericardial effusion. Left pleural effusion. Technically fair quality study. No comparison study available. Suggest clinical correlation. Stress Test:5/13/2019) Clinical: Negative for Regadenoson induced angina. ECG: Negative for Regadenoson induced ischemia. Nuclear scan: a. Moderate size, severely intense, reversible ischemia involving the anterior wall and apex. b. Normal wall motion  c. Normal ejection fraction. Angiography: 10/11/19 1. Unsuccessful attempt to heavily calcified lesion of left circumflex  artery and diagonal branch. 2.  Totally occluded LAD with patent LIMA to LAD. 3.  Significant calcified lesion at the proximal first diagonal branch. 4.  Significant disease involving OM1, OM2 and borderline lesion  involving the proximal circumflex artery and significant lesion  involving the distal left circumflex artery. 5.  Totally occluded RCA. 6.  Totally occluded SVG graft to OM1, OM2.  7.  Totally occluded SVG graft to RCA. 8.  Patent LIMA to distal LAD with mildly to moderately diffusely  diseased LAD distal to LIMA to LAD anastomosis. CABG - (6/17/2019) Coronary artery bypass grafting times four, LIMA to the LAD, sequential vein bypass graft to the first and second obtuse marginal artery. Saphenous vein bypass graft to the posterior descending artery.   Cardiology Labs: BMP:    Lab Results   Component Value Date     10/12/2019    K 4.1 10/12/2019     10/12/2019    CO2 23 10/12/2019    BUN 15 10/12/2019    CREATININE 0.6 10/12/2019     CMP:    Lab Results   Component Value Date     10/12/2019    K 4.1 10/12/2019     10/12/2019    CO2 23 10/12/2019    BUN 15 10/12/2019    CREATININE 0.6 10/12/2019    PROT 6.8 07/02/2019     CBC:    Lab Results   Component Value Date    WBC 6.3 10/12/2019    RBC 3.99 10/12/2019    HGB 12.1 10/12/2019    HCT 37.9 10/12/2019    MCV 95.0 10/12/2019    RDW 13.0 10/12/2019     10/12/2019     PT/INR:  No results found for: PTINR  PT/INR Warfarin:  No components found for: PTPATWAR, PTINRWAR  PTT:    Lab Results   Component Value Date    APTT 37.7 10/09/2019     PTT Heparin:  No components found for: APTTHEP  Magnesium:  No results found for: MG  TSH:    Lab Results   Component Value Date    TSH 1.523 10/26/2010     TROPONIN:  No components found for: TROP  BNP:  No results found for: BNP  FASTING LIPID PANEL:    Lab Results   Component Value Date    CHOL 155 07/03/2019    HDL 26 07/03/2019    TRIG 137 07/03/2019     No orders to display     I have personally reviewed the laboratory, cardiac diagnostic and radiographic testing as outlined above:      IMPRESSION:  1. Atherosclerotic heart disease of native coronary artery without angina pectoris:  Status post CABG with LIMA to LAD sequential SVG to M1 and M2, SVG to right PDA, had cardiac catheterization on 10/11/2019 with the following findings:   *  Unsuccessful attempt to heavily calcified lesion of left circumflex artery and diagonal branch. *  Totally occluded LAD with patent LIMA to LAD. *  Significant calcified lesion at the proximal first diagonal branch. *  Significant disease involving OM1, OM2 and borderline lesion involving the proximal circumflex artery and significant lesion involving the distal left circumflex artery. *  Totally occluded RCA. *  Totally occluded SVG graft to OM1, OM2.  *  Totally occluded SVG graft to RCA. *  Patent LIMA to distal LAD with mildly to moderately diffusely diseased LAD distal to LIMA to LAD anastomosis. Was referred to [facility] clinic for second opinion, continuing medical therapy was advised, doing well on medical therapy and cardiac rehab. 2.  Essential (primary) hypertension: Controlled, continue current treatment. 3.  Hyperlipidemia, unspecified : On Repatha  4. Personal history of pulmonary embolism       RECOMMENDATIONS:   1. Continue current treatment  2.   Preventive cardiology: Low-salt, low-cholesterol diet, daily exercise, total cholesterol of less than 200, LDL of less than 70, were all advised. 3.  Follow-up with Dr. Kirsten Smith as scheduled  4. Follow-up with Dr. Kim Parham in 6 months, sooner if symptomatic for any reason    I have reviewed my findings and recommendations with patient    Electronically signed by Mesfin Kothari MD on 9/23/2020 at 8:10 PM  NOTE: This report was transcribed using voice recognition software.  Every effort was made to ensure accuracy; however, inadvertent computerized transcription errors may be present

## 2020-09-22 ENCOUNTER — OFFICE VISIT (OUTPATIENT)
Dept: CARDIOLOGY CLINIC | Age: 80
End: 2020-09-22
Payer: MEDICARE

## 2020-09-22 VITALS
SYSTOLIC BLOOD PRESSURE: 124 MMHG | DIASTOLIC BLOOD PRESSURE: 78 MMHG | BODY MASS INDEX: 39.51 KG/M2 | HEIGHT: 63 IN | HEART RATE: 65 BPM | WEIGHT: 223 LBS

## 2020-09-22 PROCEDURE — 99213 OFFICE O/P EST LOW 20 MIN: CPT | Performed by: INTERNAL MEDICINE

## 2020-09-22 PROCEDURE — 93000 ELECTROCARDIOGRAM COMPLETE: CPT | Performed by: INTERNAL MEDICINE

## 2020-09-22 RX ORDER — METOPROLOL SUCCINATE 50 MG/1
50 TABLET, EXTENDED RELEASE ORAL 2 TIMES DAILY
COMMUNITY
Start: 2020-08-25 | End: 2021-05-11 | Stop reason: SDUPTHER

## 2020-09-22 RX ORDER — AZELASTINE 1 MG/ML
1 SPRAY, METERED NASAL 2 TIMES DAILY
COMMUNITY

## 2020-09-22 RX ORDER — PANTOPRAZOLE SODIUM 40 MG/1
40 TABLET, DELAYED RELEASE ORAL DAILY
COMMUNITY
Start: 2020-08-30 | End: 2021-05-11 | Stop reason: SDUPTHER

## 2020-09-22 RX ORDER — IBUPROFEN 200 MG
200 TABLET ORAL EVERY 6 HOURS PRN
COMMUNITY

## 2020-09-22 RX ORDER — ASPIRIN 325 MG
325 TABLET ORAL DAILY
COMMUNITY
End: 2021-11-01

## 2020-09-22 RX ORDER — MONTELUKAST SODIUM 10 MG/1
10 TABLET ORAL DAILY
COMMUNITY
Start: 2020-08-25 | End: 2021-05-11 | Stop reason: SDUPTHER

## 2020-10-20 ENCOUNTER — HOSPITAL ENCOUNTER (OUTPATIENT)
Age: 80
Discharge: HOME | End: 2020-10-22
Payer: MEDICARE

## 2020-10-20 ENCOUNTER — HOSPITAL ENCOUNTER (OUTPATIENT)
Dept: MRI IMAGING | Age: 80
Discharge: HOME | End: 2020-10-22
Payer: MEDICARE

## 2020-10-20 ENCOUNTER — HOSPITAL ENCOUNTER (OUTPATIENT)
Dept: GENERAL RADIOLOGY | Age: 80
Discharge: HOME | End: 2020-10-22
Payer: MEDICARE

## 2020-10-20 PROCEDURE — 73521 X-RAY EXAM HIPS BI 2 VIEWS: CPT

## 2020-10-20 PROCEDURE — 72148 MRI LUMBAR SPINE W/O DYE: CPT

## 2020-11-03 PROBLEM — I25.10 CAD (CORONARY ARTERY DISEASE), NATIVE CORONARY ARTERY: Status: RESOLVED | Noted: 2019-07-02 | Resolved: 2020-11-03

## 2021-02-23 LAB
ALBUMIN SERPL-MCNC: NORMAL G/DL
ALP BLD-CCNC: NORMAL U/L
ALT SERPL-CCNC: NORMAL U/L
ANION GAP SERPL CALCULATED.3IONS-SCNC: NORMAL MMOL/L
AST SERPL-CCNC: NORMAL U/L
BASOPHILS ABSOLUTE: NORMAL
BASOPHILS RELATIVE PERCENT: NORMAL
BILIRUB SERPL-MCNC: NORMAL MG/DL
BILIRUBIN, URINE: NORMAL
BLOOD, URINE: NORMAL
BUN BLDV-MCNC: NORMAL MG/DL
CALCIUM SERPL-MCNC: NORMAL MG/DL
CHLORIDE BLD-SCNC: NORMAL MMOL/L
CHOLESTEROL, TOTAL: NORMAL
CHOLESTEROL/HDL RATIO: NORMAL
CLARITY: NORMAL
CO2: NORMAL
COLOR: NORMAL
CREAT SERPL-MCNC: NORMAL MG/DL
EOSINOPHILS ABSOLUTE: NORMAL
EOSINOPHILS RELATIVE PERCENT: NORMAL
GFR CALCULATED: NORMAL
GLUCOSE BLD-MCNC: NORMAL MG/DL
GLUCOSE URINE: NORMAL
HCT VFR BLD CALC: NORMAL %
HDLC SERPL-MCNC: NORMAL MG/DL
HEMOGLOBIN: NORMAL
KETONES, URINE: NORMAL
LDL CHOLESTEROL CALCULATED: NORMAL
LEUKOCYTE ESTERASE, URINE: NORMAL
LYMPHOCYTES ABSOLUTE: NORMAL
LYMPHOCYTES RELATIVE PERCENT: NORMAL
MCH RBC QN AUTO: NORMAL PG
MCHC RBC AUTO-ENTMCNC: NORMAL G/DL
MCV RBC AUTO: NORMAL FL
MONOCYTES ABSOLUTE: NORMAL
MONOCYTES RELATIVE PERCENT: NORMAL
NEUTROPHILS ABSOLUTE: NORMAL
NEUTROPHILS RELATIVE PERCENT: NORMAL
NITRITE, URINE: NORMAL
NONHDLC SERPL-MCNC: NORMAL MG/DL
PH UA: NORMAL
PLATELET # BLD: NORMAL 10*3/UL
PMV BLD AUTO: NORMAL FL
POTASSIUM SERPL-SCNC: NORMAL MMOL/L
PROTEIN UA: NORMAL
RBC # BLD: NORMAL 10*6/UL
SODIUM BLD-SCNC: NORMAL MMOL/L
SPECIFIC GRAVITY, URINE: NORMAL
TOTAL PROTEIN: NORMAL
TRIGL SERPL-MCNC: NORMAL MG/DL
UROBILINOGEN, URINE: NORMAL
VLDLC SERPL CALC-MCNC: NORMAL MG/DL
WBC # BLD: NORMAL 10*3/UL

## 2021-03-22 ENCOUNTER — HOSPITAL ENCOUNTER (OUTPATIENT)
Dept: ULTRASOUND IMAGING | Age: 81
Discharge: HOME | End: 2021-03-22
Payer: MEDICARE

## 2021-03-22 ENCOUNTER — HOSPITAL ENCOUNTER (OUTPATIENT)
Dept: MAMMOGRAPHY | Age: 81
Discharge: HOME | End: 2021-03-24
Payer: MEDICARE

## 2021-03-22 DIAGNOSIS — R09.89 BRUIT: ICD-10-CM

## 2021-03-22 DIAGNOSIS — M81.0 POSTMENOPAUSAL OSTEOPOROSIS: ICD-10-CM

## 2021-03-22 DIAGNOSIS — Z12.31 SCREENING MAMMOGRAM, ENCOUNTER FOR: ICD-10-CM

## 2021-03-22 PROCEDURE — 76775 US EXAM ABDO BACK WALL LIM: CPT

## 2021-03-22 PROCEDURE — 77067 SCR MAMMO BI INCL CAD: CPT

## 2021-03-22 PROCEDURE — 77080 DXA BONE DENSITY AXIAL: CPT

## 2021-04-22 VITALS — HEIGHT: 63 IN | RESPIRATION RATE: 14 BRPM | TEMPERATURE: 97 F | BODY MASS INDEX: 39.5 KG/M2 | HEART RATE: 85 BPM

## 2021-04-22 RX ORDER — MOMETASONE FUROATE 50 UG/1
1 SPRAY, METERED NASAL PRN
COMMUNITY

## 2021-05-12 RX ORDER — PANTOPRAZOLE SODIUM 40 MG/1
40 TABLET, DELAYED RELEASE ORAL DAILY
Qty: 90 TABLET | Refills: 0 | Status: SHIPPED
Start: 2021-05-12 | End: 2021-06-07 | Stop reason: SDUPTHER

## 2021-05-12 RX ORDER — METOPROLOL SUCCINATE 50 MG/1
50 TABLET, EXTENDED RELEASE ORAL 2 TIMES DAILY
Qty: 90 TABLET | Refills: 0 | Status: SHIPPED
Start: 2021-05-12 | End: 2021-06-07 | Stop reason: SDUPTHER

## 2021-05-12 RX ORDER — FUROSEMIDE 20 MG/1
20 TABLET ORAL DAILY
Qty: 90 TABLET | Refills: 0 | Status: SHIPPED
Start: 2021-05-12 | End: 2021-06-07 | Stop reason: SDUPTHER

## 2021-05-12 RX ORDER — MONTELUKAST SODIUM 10 MG/1
10 TABLET ORAL DAILY
Qty: 90 TABLET | Refills: 0 | Status: SHIPPED
Start: 2021-05-12 | End: 2021-06-07 | Stop reason: SDUPTHER

## 2021-05-12 RX ORDER — ISOSORBIDE MONONITRATE 30 MG/1
30 TABLET, EXTENDED RELEASE ORAL DAILY
Qty: 90 TABLET | Refills: 0 | Status: SHIPPED
Start: 2021-05-12 | End: 2021-06-07 | Stop reason: SDUPTHER

## 2021-05-27 ENCOUNTER — OFFICE VISIT (OUTPATIENT)
Dept: PRIMARY CARE CLINIC | Age: 81
End: 2021-05-27
Payer: MEDICARE

## 2021-05-27 VITALS
OXYGEN SATURATION: 97 % | HEIGHT: 63 IN | WEIGHT: 230.3 LBS | TEMPERATURE: 97.1 F | BODY MASS INDEX: 40.8 KG/M2 | HEART RATE: 65 BPM | SYSTOLIC BLOOD PRESSURE: 110 MMHG | DIASTOLIC BLOOD PRESSURE: 64 MMHG

## 2021-05-27 DIAGNOSIS — M67.432 GANGLION CYST OF VOLAR ASPECT OF LEFT WRIST: ICD-10-CM

## 2021-05-27 DIAGNOSIS — I10 ESSENTIAL HYPERTENSION: Primary | ICD-10-CM

## 2021-05-27 DIAGNOSIS — E78.5 HYPERLIPIDEMIA LDL GOAL <100: Chronic | ICD-10-CM

## 2021-05-27 DIAGNOSIS — K58.1 IRRITABLE BOWEL SYNDROME WITH CONSTIPATION: ICD-10-CM

## 2021-05-27 DIAGNOSIS — I25.728 CORONARY ARTERY DISEASE OF AUTOLOGOUS BYPASS GRAFT WITH STABLE ANGINA PECTORIS (HCC): ICD-10-CM

## 2021-05-27 PROBLEM — I25.10 CAD IN NATIVE ARTERY: Status: RESOLVED | Noted: 2019-10-11 | Resolved: 2021-05-27

## 2021-05-27 PROCEDURE — 99213 OFFICE O/P EST LOW 20 MIN: CPT | Performed by: INTERNAL MEDICINE

## 2021-05-27 ASSESSMENT — PATIENT HEALTH QUESTIONNAIRE - PHQ9
SUM OF ALL RESPONSES TO PHQ QUESTIONS 1-9: 1
2. FEELING DOWN, DEPRESSED OR HOPELESS: 1
SUM OF ALL RESPONSES TO PHQ9 QUESTIONS 1 & 2: 1
SUM OF ALL RESPONSES TO PHQ QUESTIONS 1-9: 1

## 2021-05-27 NOTE — PROGRESS NOTES
[facility] Cardiology Progress Note  Dr. [facility]      Referring Physician: Helen Mcgarry MD  CHIEF COMPLAINT:   Chief Complaint   Patient presents with    Coronary Artery Disease     9 month check . SOB on exertion. Patient denies any cp or dizziness. HISTORY OF PRESENT ILLNESS:    Patient is 79year old female with history of coronary artery disease, hyperlipidemia, diabetes mellitus, GERD, diastolic CHF, pulmonary embolism, is here for follow-up appointment. Shortness of breath is at baseline, patient denies any chest pain, no lightheadedness, no dizziness, no palpitations, no pedal edema, no PND, no orthopnea, no syncope, no presyncopal episodes. Functional capacity is at baseline      Past Medical History:   Diagnosis Date    Arthritis     shoulders, hips and knees, fingers    Bronchitis     CAD (coronary artery disease)     some blockage, being treated with aspirin    GERD (gastroesophageal reflux disease)     Hyperlipidemia     Hypertension     Sinus disorder          Past Surgical History:   Procedure Laterality Date    APPENDECTOMY      APPENDECTOMY      BLADDER SURGERY      BLADDER SUSPENSION      CARDIAC SURGERY      CHOLECYSTECTOMY      CHOLECYSTECTOMY      COLONOSCOPY  11/29/12    diverticulosis, hemorrhoid    COLONOSCOPY  september 2015    CORONARY ANGIOPLASTY  10/11/2019    Angioplasty LAD by Dr Chas Banegas GRAFT  06/17/2019    Bypass x4.   [facility].  LIMB-LAD, SVG-OM1 SVG-OM2 SVG-RCA    CYST REMOVAL      back    DILATION AND CURETTAGE OF UTERUS      HYSTERECTOMY           Current Outpatient Medications   Medication Sig Dispense Refill    acetaminophen (TYLENOL 8 HOUR ARTHRITIS PAIN) 650 MG extended release tablet Take 650 mg by mouth 2 times daily      pantoprazole (PROTONIX) 40 MG tablet Take 1 tablet by mouth daily 90 tablet 0    montelukast (SINGULAIR) 10 MG tablet Take 1 tablet by mouth daily 90 tablet 0    metoprolol succinate (TOPROL XL) 50 MG extended release tablet Take 1 tablet by mouth 2 times daily 90 tablet 0    furosemide (LASIX) 20 MG tablet Take 1 tablet by mouth daily 90 tablet 0    isosorbide mononitrate (IMDUR) 30 MG extended release tablet Take 1 tablet by mouth daily 90 tablet 0    Cholecalciferol (VITAMIN D3) 125 MCG (5000 UT) TABS Take 1 tablet by mouth daily 90 tablet 0    mometasone (NASONEX) 50 MCG/ACT nasal spray 1 spray by Each Nostril route daily      Albuterol Sulfate (PROAIR HFA IN) Inhale 2 puffs into the lungs 4 times daily      Evolocumab 140 MG/ML SOAJ 1 SC EVERY 2 WEEKS FOR CHOLESTEROL 2 mL 3    azelastine (ASTELIN) 0.1 % nasal spray 1 spray by Nasal route 2 times daily Use in each nostril as directed      aspirin 325 MG tablet Take 325 mg by mouth daily      ibuprofen (ADVIL;MOTRIN) 200 MG tablet Take 200 mg by mouth every 6 hours as needed for Pain      loratadine (CLARITIN) 10 MG tablet Take 10 mg by mouth daily      senna (SENOKOT) 8.6 MG tablet Take 2 tablets by mouth daily      nitroGLYCERIN (NITROSTAT) 0.4 MG SL tablet Place 0.4 mg under the tongue every 5 minutes as needed for Chest pain up to max of 3 total doses. If no relief after 1 dose, call 911. No current facility-administered medications for this visit. Allergies as of 05/28/2021 - Fully Reviewed 05/28/2021   Allergen Reaction Noted    Statins [statins] Swelling and Rash 11/28/2012    Adhesive tape  09/09/2015    Duricef [cefadroxil]  11/28/2012    Gabapentin  11/28/2012    Oxaprozin  11/28/2012    Sulfa antibiotics Rash 09/09/2015       Social History     Socioeconomic History    Marital status:       Spouse name: Not on file    Number of children: Not on file    Years of education: Not on file    Highest education level: Not on file   Occupational History    Not on file   Tobacco Use    Smoking status: Former Smoker     Packs/day: 1.00     Years: 37.00     Pack years: 37.00     Start date: 57     Quit date: 1984     Years since quittin.4    Smokeless tobacco: Never Used   Vaping Use    Vaping Use: Never used   Substance and Sexual Activity    Alcohol use: No     Comment: 2 cup of coffee a day     Drug use: No    Sexual activity: Not on file   Other Topics Concern    Not on file   Social History Narrative    Not on file     Social Determinants of Health     Financial Resource Strain:     Difficulty of Paying Living Expenses:    Food Insecurity:     Worried About Running Out of Food in the Last Year:     Ran Out of Food in the Last Year:    Transportation Needs:     Lack of Transportation (Medical):      Lack of Transportation (Non-Medical):    Physical Activity:     Days of Exercise per Week:     Minutes of Exercise per Session:    Stress:     Feeling of Stress :    Social Connections:     Frequency of Communication with Friends and Family:     Frequency of Social Gatherings with Friends and Family:     Attends Anabaptist Services:     Active Member of Clubs or Organizations:     Attends Club or Organization Meetings:     Marital Status:    Intimate Partner Violence:     Fear of Current or Ex-Partner:     Emotionally Abused:     Physically Abused:     Sexually Abused:        Family History   Problem Relation Age of Onset    Cancer Mother     Stroke Father        REVIEW OF SYSTEMS:     CONSTITUTIONAL:  negative for  fevers, chills, sweats and fatigue  HEENT:  negative for  tinnitus, earaches, nasal congestion and epistaxis  RESPIRATORY:  negative for  dry cough, cough with sputum, wheezing and hemoptysis  GASTROINTESTINAL:  negative for nausea, vomiting, diarrhea, constipation, pruritus and jaundice  HEMATOLOGIC/LYMPHATIC:  negative for easy bruising, bleeding, lymphadenopathy and petechiae  ENDOCRINE:  negative for heat intolerance, cold intolerance, tremor, hair loss and diabetic symptoms including neither polyuria nor polydipsia nor blurred vision  MUSCULOSKELETAL:  negative for myalgias, arthralgias, joint swelling, stiff joints and decreased range of motion  NEUROLOGICAL:  negative for memory problems, speech problems, visual disturbance, dysphagia, weakness and numbness      PHYSICAL EXAM:   CONSTITUTIONAL:  awake, alert, cooperative, no apparent distress, and appears stated age  HEENT:  Moist and pink mucous membranes, normocephalic, without obvious abnormality, atraumatic  NECK:  Supple, symmetrical, trachea midline, no adenopathy, thyroid symmetric, not enlarged and no tenderness, skin normal  LUNGS:  No increased work of breathing, good air exchange, clear to auscultation bilaterally, no crackles or wheezing  CARDIOVASCULAR:  Normal apical impulse, regular rate and rhythm, normal S1 and S2, no S3 or S4,no murmur,and no pedal edema, no carotid bruit, no JVD, no pulsating masses. ABDOMEN:soft, nontender, no hepatomegaly, no splenomegaly, bowel sounds positive. CHEST:expands symmetrically, nontender to palpation  MUSCULOSKELETAL:  No clubbing, no cyanosis, no pedal edema,there is no redness, warmth, or swelling of the joints  full range of motion noted. NEUROLOGIC:  Alert, awake, oriented ×3. SKIN: no bruises, no bleeding, normal skin color, texture, turgor and no redness, warmth, or swelling      /84 (Site: Right Upper Arm, Position: Sitting, Cuff Size: Large Adult)   Pulse 59   Ht 5' 3\" (1.6 m)   Wt 227 lb (103 kg)   BMI 40.21 kg/m²     DATA:   I personally reviewed the visit EKG with the following interpretation: Sinus bradycardia, low voltage QRS, possible old anteroseptal wall MI age undetermined    EKG 9/22/20 Sinus rhythm, low QRS voltage with a poor R wave progression nonspecific ST changes in the lateral leads    ECHO: 7/3/19 Summary   Left ventricle grossly normal in size. Normal LV segmental wall motion. Normal left ventricular wall thickness. Estimated left ventricular ejection fraction is 67±5%. <50% criteria for diastolic dysfunction.    The LAESV Index is <34ml/m2. Normal right ventricular size and function   Physiologic and/or trace mitral regurgitation is present. The tricuspid valve appears structurally normal.   RVSP is 30 mmHg. Physiologic and/or trace tricuspid regurgitation. There is a small pericardial effusion. Left pleural effusion. Technically fair quality study. No comparison study available. Suggest clinical correlation. Stress Test:5/13/2019) Clinical: Negative for Regadenoson induced angina. ECG: Negative for Regadenoson induced ischemia. Nuclear scan: a. Moderate size, severely intense, reversible ischemia involving the anterior wall and apex. b. Normal wall motion  c. Normal ejection fraction. Angiography: 10/11/19 1. Unsuccessful attempt to heavily calcified lesion of left circumflex  artery and diagonal branch. 2.  Totally occluded LAD with patent LIMA to LAD. 3.  Significant calcified lesion at the proximal first diagonal branch. 4.  Significant disease involving OM1, OM2 and borderline lesion  involving the proximal circumflex artery and significant lesion  involving the distal left circumflex artery. 5.  Totally occluded RCA. 6.  Totally occluded SVG graft to OM1, OM2.  7.  Totally occluded SVG graft to RCA. 8.  Patent LIMA to distal LAD with mildly to moderately diffusely  diseased LAD distal to LIMA to LAD anastomosis. CABG - (6/17/2019) Coronary artery bypass grafting times four, LIMA to the LAD, sequential vein bypass graft to the first and second obtuse marginal artery. Saphenous vein bypass graft to the posterior descending artery.   Cardiology Labs: BMP:    Lab Results   Component Value Date     10/12/2019    K 4.1 10/12/2019     10/12/2019    CO2 23 10/12/2019    BUN 15 10/12/2019    CREATININE 0.6 10/12/2019     CMP:    Lab Results   Component Value Date     10/12/2019    K 4.1 10/12/2019     10/12/2019    CO2 23 10/12/2019    BUN 15 10/12/2019    CREATININE 0.6 of pulmonary embolism       RECOMMENDATIONS:   1. Continue current treatment  2. Preventive cardiology: Low-salt, low-cholesterol diet, daily exercise, total cholesterol of less than 200, LDL of less than 70, were all advised. 3.  Follow-up with Dr. Kishan Dooley as scheduled  4. Follow-up with Dr. Yannick Barcenas in 6 months, sooner if symptomatic for any reason    I have reviewed my findings and recommendations with patient    Electronically signed by Joleen Elmore MD on 5/28/2021 at 5:35 PM  NOTE: This report was transcribed using voice recognition software.  Every effort was made to ensure accuracy; however, inadvertent computerized transcription errors may be present

## 2021-05-27 NOTE — PROGRESS NOTES
Chief Complaint   Patient presents with    Hypertension     f/u visit with no labs or tests       HPI:  Patient is here for follow-up. Patient feels generally well gets short of breath with exertion or hot weather no chest pain  Patient complains of intermittent left leg swelling same leg that she had the venous autograft for the CABG surgery denied any pain or redness  Patient also complained of left wrist lumps 2 of them where she wears her watch. Past Medical History, Surgical History, and Family History has been reviewed and updated. Review of Systems:  Constitutional:  No fever, no fatigue, no chills, no headaches, no weight change  Dermatology:  No rash, no mole, no dry or sensitive skin  ENT:  No cough, no sore throat, no sinus pain, no runny nose, no ear pain  Cardiology:  No chest pain, no palpitations, no leg edema, no shortness of breath, no PND  Gastroenterology:  No dysphagia, no abdominal pain, no nausea, no vomiting, no constipation, no diarrhea, no heartburn  Musculoskeletal:  No joint pain, no leg cramps, no back pain, no muscle aches  Respiratory:  No shortness of breath, no orthopnea, no wheezing, no PINTO, no hemoptysis  Urology:  No blood in the urine, no urinary frequency, no urinary incontinence, no urinary urgency, no nocturia, no dysuria    Vitals:    05/27/21 1251   BP: 110/64   Site: Right Upper Arm   Position: Sitting   Cuff Size: Medium Adult   Pulse: 65   Temp: 97.1 °F (36.2 °C)   SpO2: 97%   Weight: 230 lb 4.8 oz (104.5 kg)   Height: 5' 3\" (1.6 m)       General:  Patient alert and oriented x 3, NAD, pleasant  HEENT:  Atraumatic, normocephalic, PERRLA, EOMI, clear conjunctiva, TMs clear, nose-clear, throat - no erythema  Neck:  Supple, no goiter, no carotid bruits, no LAD  Lungs:  CTA   Heart:  RRR, no murmurs, gallops or rubs.   Midline anterior chest wall scar  Abdomen:  Soft/nt/nd, + bowel sounds  Extremities:  No clubbing, cyanosis + left leg non pitting edema, no calf tenderness Homans' sign negative  Skin: unremarkable    Hemoglobin A1C   Date Value Ref Range Status   05/27/2020 6.0 % Final     Cholesterol, Total   Date Value Ref Range Status   07/03/2019 155 0 - 199 mg/dL Final     Triglycerides   Date Value Ref Range Status   07/03/2019 137 0 - 149 mg/dL Final     HDL   Date Value Ref Range Status   07/03/2019 26 >40 mg/dL Final     LDL Calculated   Date Value Ref Range Status   07/03/2019 102 (H) 0 - 99 mg/dL Final     VLDL Cholesterol Calculated   Date Value Ref Range Status   07/03/2019 27 mg/dL Final     Sodium   Date Value Ref Range Status   10/12/2019 139 132 - 146 mmol/L Final     Potassium   Date Value Ref Range Status   10/12/2019 4.1 3.5 - 5.0 mmol/L Final     Chloride   Date Value Ref Range Status   10/12/2019 105 98 - 107 mmol/L Final     CO2   Date Value Ref Range Status   10/12/2019 23 22 - 29 mmol/L Final     BUN   Date Value Ref Range Status   10/12/2019 15 8 - 23 mg/dL Final     CREATININE   Date Value Ref Range Status   10/12/2019 0.6 0.5 - 1.0 mg/dL Final     Glucose   Date Value Ref Range Status   10/12/2019 110 (H) 74 - 99 mg/dL Final     Calcium   Date Value Ref Range Status   10/12/2019 9.3 8.6 - 10.2 mg/dL Final     Total Protein   Date Value Ref Range Status   07/02/2019 6.8 6.4 - 8.3 g/dL Final     Albumin   Date Value Ref Range Status   07/02/2019 3.5 3.5 - 5.2 g/dL Final     Total Bilirubin   Date Value Ref Range Status   07/02/2019 0.5 0.0 - 1.2 mg/dL Final     Alkaline Phosphatase   Date Value Ref Range Status   07/02/2019 104 35 - 104 U/L Final     AST   Date Value Ref Range Status   07/02/2019 30 0 - 31 U/L Final     Comment:     Specimen is slightly Hemolyzed. Result may be artificially increased. ALT   Date Value Ref Range Status   07/02/2019 32 0 - 32 U/L Final     GFR Non-   Date Value Ref Range Status   10/12/2019 >60 >=60 mL/min/1.73 Final     Comment:     Chronic Kidney Disease: less than 60 ml/min/1.73 sq.m. Kidney Failure: less than 15 ml/min/1.73 sq.m. Results valid for patients 18 years and older. GFR    Date Value Ref Range Status   10/12/2019 >60  Final        No results found. Assessment/Plan:      Outpatient Encounter Medications as of 5/27/2021   Medication Sig Dispense Refill    pantoprazole (PROTONIX) 40 MG tablet Take 1 tablet by mouth daily 90 tablet 0    montelukast (SINGULAIR) 10 MG tablet Take 1 tablet by mouth daily 90 tablet 0    metoprolol succinate (TOPROL XL) 50 MG extended release tablet Take 1 tablet by mouth 2 times daily 90 tablet 0    furosemide (LASIX) 20 MG tablet Take 1 tablet by mouth daily 90 tablet 0    isosorbide mononitrate (IMDUR) 30 MG extended release tablet Take 1 tablet by mouth daily 90 tablet 0    Cholecalciferol (VITAMIN D3) 125 MCG (5000 UT) TABS Take 1 tablet by mouth daily 90 tablet 0    mometasone (NASONEX) 50 MCG/ACT nasal spray 1 spray by Each Nostril route daily      Albuterol Sulfate (PROAIR HFA IN) Inhale 2 puffs into the lungs 4 times daily      Evolocumab 140 MG/ML SOAJ 1 SC EVERY 2 WEEKS FOR CHOLESTEROL 2 mL 3    azelastine (ASTELIN) 0.1 % nasal spray 1 spray by Nasal route 2 times daily Use in each nostril as directed      aspirin 325 MG tablet Take 325 mg by mouth daily      ibuprofen (ADVIL;MOTRIN) 200 MG tablet Take 200 mg by mouth every 6 hours as needed for Pain      loratadine (CLARITIN) 10 MG tablet Take 10 mg by mouth daily      senna (SENOKOT) 8.6 MG tablet Take 2 tablets by mouth daily      nitroGLYCERIN (NITROSTAT) 0.4 MG SL tablet Place 0.4 mg under the tongue every 5 minutes as needed for Chest pain up to max of 3 total doses. If no relief after 1 dose, call 911.       [DISCONTINUED] Phenylephrine-Ibuprofen (ADVIL SINUS CONGESTION & PAIN PO) Take 1 tablet by mouth as needed      [DISCONTINUED] Dextromethorphan-guaiFENesin  MG/5ML SYRP Take 10 mLs by mouth every 4 hours as needed for Cough (Patient not taking: Reported on 5/27/2021)       No facility-administered encounter medications on file as of 5/27/2021. Pam Dolan was seen today for hypertension. 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

## 2021-05-27 NOTE — PATIENT INSTRUCTIONS
Patient was advised to wear left leg below knee support stocking  Continue on all current medications  Patient declined surgical referral regarding left wrist ganglion cysts  Get lab work before next visit in 3 months

## 2021-05-28 ENCOUNTER — OFFICE VISIT (OUTPATIENT)
Dept: CARDIOLOGY CLINIC | Age: 81
End: 2021-05-28
Payer: MEDICARE

## 2021-05-28 VITALS
DIASTOLIC BLOOD PRESSURE: 84 MMHG | HEIGHT: 63 IN | BODY MASS INDEX: 40.22 KG/M2 | SYSTOLIC BLOOD PRESSURE: 122 MMHG | HEART RATE: 59 BPM | WEIGHT: 227 LBS

## 2021-05-28 DIAGNOSIS — I25.10 CORONARY ARTERY DISEASE INVOLVING NATIVE CORONARY ARTERY OF NATIVE HEART WITHOUT ANGINA PECTORIS: Primary | ICD-10-CM

## 2021-05-28 PROCEDURE — 99213 OFFICE O/P EST LOW 20 MIN: CPT | Performed by: INTERNAL MEDICINE

## 2021-05-28 PROCEDURE — 93000 ELECTROCARDIOGRAM COMPLETE: CPT | Performed by: INTERNAL MEDICINE

## 2021-05-28 RX ORDER — SENNOSIDES 8.6 MG
650 CAPSULE ORAL 2 TIMES DAILY PRN
COMMUNITY

## 2021-06-07 RX ORDER — METOPROLOL SUCCINATE 50 MG/1
50 TABLET, EXTENDED RELEASE ORAL 2 TIMES DAILY
Qty: 90 TABLET | Refills: 0 | Status: SHIPPED
Start: 2021-06-07 | End: 2021-08-31 | Stop reason: SDUPTHER

## 2021-06-07 RX ORDER — MONTELUKAST SODIUM 10 MG/1
10 TABLET ORAL DAILY
Qty: 90 TABLET | Refills: 0 | Status: SHIPPED
Start: 2021-06-07 | End: 2021-08-31 | Stop reason: SDUPTHER

## 2021-06-07 RX ORDER — ISOSORBIDE MONONITRATE 30 MG/1
30 TABLET, EXTENDED RELEASE ORAL DAILY
Qty: 90 TABLET | Refills: 0 | Status: SHIPPED
Start: 2021-06-07 | End: 2021-08-31 | Stop reason: SDUPTHER

## 2021-06-07 RX ORDER — PANTOPRAZOLE SODIUM 40 MG/1
40 TABLET, DELAYED RELEASE ORAL DAILY
Qty: 90 TABLET | Refills: 0 | Status: SHIPPED
Start: 2021-06-07 | End: 2021-08-31 | Stop reason: SDUPTHER

## 2021-06-07 RX ORDER — FUROSEMIDE 20 MG/1
20 TABLET ORAL DAILY
Qty: 90 TABLET | Refills: 0 | Status: SHIPPED
Start: 2021-06-07 | End: 2021-08-31 | Stop reason: SDUPTHER

## 2021-06-28 RX ORDER — LORATADINE 10 MG/1
10 TABLET ORAL DAILY
Qty: 90 TABLET | Refills: 0 | Status: SHIPPED
Start: 2021-06-28 | End: 2021-08-31 | Stop reason: SDUPTHER

## 2021-08-31 ENCOUNTER — OFFICE VISIT (OUTPATIENT)
Dept: PRIMARY CARE CLINIC | Age: 81
End: 2021-08-31
Payer: MEDICARE

## 2021-08-31 VITALS
SYSTOLIC BLOOD PRESSURE: 130 MMHG | OXYGEN SATURATION: 96 % | TEMPERATURE: 97 F | HEART RATE: 60 BPM | BODY MASS INDEX: 40.06 KG/M2 | DIASTOLIC BLOOD PRESSURE: 70 MMHG | WEIGHT: 226.1 LBS | HEIGHT: 63 IN

## 2021-08-31 DIAGNOSIS — I10 ESSENTIAL HYPERTENSION: ICD-10-CM

## 2021-08-31 DIAGNOSIS — E78.5 HYPERLIPIDEMIA LDL GOAL <100: Chronic | ICD-10-CM

## 2021-08-31 DIAGNOSIS — I25.728 CORONARY ARTERY DISEASE OF AUTOLOGOUS BYPASS GRAFT WITH STABLE ANGINA PECTORIS (HCC): ICD-10-CM

## 2021-08-31 DIAGNOSIS — I50.31 DIASTOLIC CHF, ACUTE (HCC): ICD-10-CM

## 2021-08-31 DIAGNOSIS — K58.1 IRRITABLE BOWEL SYNDROME WITH CONSTIPATION: Primary | ICD-10-CM

## 2021-08-31 PROCEDURE — 99213 OFFICE O/P EST LOW 20 MIN: CPT | Performed by: INTERNAL MEDICINE

## 2021-08-31 RX ORDER — GUAIFENESIN 400 MG/1
400 TABLET ORAL 4 TIMES DAILY PRN
COMMUNITY
End: 2022-04-19

## 2021-08-31 RX ORDER — LUBIPROSTONE 8 UG/1
8 CAPSULE, GELATIN COATED ORAL 2 TIMES DAILY WITH MEALS
Qty: 60 CAPSULE | Refills: 1 | Status: SHIPPED
Start: 2021-08-31 | End: 2021-11-01 | Stop reason: SDUPTHER

## 2021-08-31 RX ORDER — ISOSORBIDE MONONITRATE 30 MG/1
30 TABLET, EXTENDED RELEASE ORAL DAILY
Qty: 90 TABLET | Refills: 0 | Status: SHIPPED
Start: 2021-08-31 | End: 2021-11-01 | Stop reason: SDUPTHER

## 2021-08-31 RX ORDER — LORATADINE 10 MG/1
10 TABLET ORAL DAILY
Qty: 90 TABLET | Refills: 0 | Status: SHIPPED
Start: 2021-08-31 | End: 2021-11-01 | Stop reason: SDUPTHER

## 2021-08-31 RX ORDER — METOPROLOL SUCCINATE 50 MG/1
50 TABLET, EXTENDED RELEASE ORAL 2 TIMES DAILY
Qty: 180 TABLET | Refills: 0 | Status: SHIPPED
Start: 2021-08-31 | End: 2021-11-01 | Stop reason: SDUPTHER

## 2021-08-31 RX ORDER — MONTELUKAST SODIUM 10 MG/1
10 TABLET ORAL DAILY
Qty: 90 TABLET | Refills: 0 | Status: SHIPPED
Start: 2021-08-31 | End: 2021-11-01 | Stop reason: SDUPTHER

## 2021-08-31 RX ORDER — FUROSEMIDE 20 MG/1
20 TABLET ORAL DAILY
Qty: 90 TABLET | Refills: 0 | Status: SHIPPED
Start: 2021-08-31 | End: 2021-11-01 | Stop reason: SDUPTHER

## 2021-08-31 RX ORDER — PANTOPRAZOLE SODIUM 40 MG/1
40 TABLET, DELAYED RELEASE ORAL DAILY
Qty: 90 TABLET | Refills: 0 | Status: SHIPPED
Start: 2021-08-31 | End: 2021-11-01 | Stop reason: SDUPTHER

## 2021-08-31 SDOH — ECONOMIC STABILITY: FOOD INSECURITY: WITHIN THE PAST 12 MONTHS, THE FOOD YOU BOUGHT JUST DIDN'T LAST AND YOU DIDN'T HAVE MONEY TO GET MORE.: NEVER TRUE

## 2021-08-31 SDOH — ECONOMIC STABILITY: FOOD INSECURITY: WITHIN THE PAST 12 MONTHS, YOU WORRIED THAT YOUR FOOD WOULD RUN OUT BEFORE YOU GOT MONEY TO BUY MORE.: NEVER TRUE

## 2021-08-31 ASSESSMENT — SOCIAL DETERMINANTS OF HEALTH (SDOH): HOW HARD IS IT FOR YOU TO PAY FOR THE VERY BASICS LIKE FOOD, HOUSING, MEDICAL CARE, AND HEATING?: NOT HARD AT ALL

## 2021-08-31 NOTE — PATIENT INSTRUCTIONS
Patient was advised to continue current medications  Start Amitiza 8 mcg 1 capsule twice a day  Follow-up in 2-month

## 2021-08-31 NOTE — PROGRESS NOTES
Chief Complaint   Patient presents with    Hypertension     f/u visit with labs. patient recieved copies of labs from front office and is concerned there are  abn. results.  Other     denies exposure to covid and denies any symptoms for covid. had covid vaccines and would like to discuss getting the booster vaccine       HPI:  Patient is here for follow-up . Patient is compliant on medications no cardiopulmonary symptoms complain of right side abdominal distention she is always constipated she has 3 bowel movements a week needs to take senna every day and takes as needed milk of magnesia. Had prior history of right abdominal distention and pain. No nausea no vomiting no fever    Past Medical History, Surgical History, and Family History has been reviewed and updated.     Review of Systems:  Constitutional:  No fever, no fatigue, no chills, no headaches, no weight change  Dermatology:  No rash, no mole, no dry or sensitive skin  ENT:  No cough, no sore throat, no sinus pain, no runny nose, no ear pain  Cardiology:  No chest pain, no palpitations, no leg edema, no shortness of breath, no PND  Gastroenterology:  No dysphagia, no abdominal pain, no nausea, no vomiting, no constipation, no diarrhea, no heartburn  Musculoskeletal:  No joint pain, no leg cramps, no back pain, no muscle aches  Respiratory:  No shortness of breath, no orthopnea, no wheezing, no PINTO, no hemoptysis  Urology:  No blood in the urine, no urinary frequency, no urinary incontinence, no urinary urgency, no nocturia, no dysuria    Vitals:    08/31/21 1302   BP: 130/70   Site: Right Upper Arm   Position: Sitting   Cuff Size: Large Adult   Pulse: 60   Temp: 97 °F (36.1 °C)   SpO2: 96%   Weight: 226 lb 1.6 oz (102.6 kg)   Height: 5' 3\" (1.6 m)       General:  Patient alert and oriented x 3, NAD, pleasant  HEENT:  Atraumatic, normocephalic, PERRLA, EOMI, clear conjunctiva, TMs clear, nose-clear, throat - no erythema  Neck:  Supple, no goiter, no carotid bruits, no LAD  Lungs:  CTA .   Midline chest wall scar  Heart:  RRR, no murmurs, gallops or rubs  Abdomen:  Soft, + bowel sounds+ distended active bowel sounds nontender  Extremities:  No clubbing, cyanosis or edema  Skin: unremarkable    Hemoglobin A1C   Date Value Ref Range Status   05/27/2020 6.0 % Final     Cholesterol, Total   Date Value Ref Range Status   08/25/2021 141 0 - 199 mg/dL Final     Triglycerides   Date Value Ref Range Status   08/25/2021 135 0 - 149 mg/dL Final     HDL   Date Value Ref Range Status   08/25/2021 42 >40 mg/dL Final     LDL Calculated   Date Value Ref Range Status   08/25/2021 72 0 - 99 mg/dL Final     VLDL Cholesterol Calculated   Date Value Ref Range Status   08/25/2021 27 mg/dL Final     Sodium   Date Value Ref Range Status   08/25/2021 141 132 - 146 mmol/L Final     Potassium   Date Value Ref Range Status   08/25/2021 4.9 3.5 - 5.0 mmol/L Final     Chloride   Date Value Ref Range Status   08/25/2021 104 98 - 107 mmol/L Final     CO2   Date Value Ref Range Status   08/25/2021 22 22 - 29 mmol/L Final     BUN   Date Value Ref Range Status   08/25/2021 17 6 - 23 mg/dL Final     CREATININE   Date Value Ref Range Status   08/25/2021 0.7 0.5 - 1.0 mg/dL Final     Glucose   Date Value Ref Range Status   08/25/2021 101 (H) 74 - 99 mg/dL Final     Calcium   Date Value Ref Range Status   08/25/2021 10.7 (H) 8.6 - 10.2 mg/dL Final     Total Protein   Date Value Ref Range Status   08/25/2021 7.6 6.4 - 8.3 g/dL Final     Albumin   Date Value Ref Range Status   08/25/2021 4.6 3.5 - 5.2 g/dL Final     Total Bilirubin   Date Value Ref Range Status   08/25/2021 0.2 0.0 - 1.2 mg/dL Final     Alkaline Phosphatase   Date Value Ref Range Status   08/25/2021 50 35 - 104 U/L Final     AST   Date Value Ref Range Status   08/25/2021 25 0 - 31 U/L Final     ALT   Date Value Ref Range Status   08/25/2021 20 0 - 32 U/L Final     GFR Non-   Date Value Ref Range Status   08/25/2021 >60 >=60 mL/min/1.73 Final     Comment:     Chronic Kidney Disease: less than 60 ml/min/1.73 sq.m. Kidney Failure: less than 15 ml/min/1.73 sq.m. Results valid for patients 18 years and older. GFR    Date Value Ref Range Status   08/25/2021 >60  Final        No results found.      Assessment/Plan: Irritable bowel syndrome constipation                                   Coronary artery disease                                   Hyperlipidemia                                   Hypertension controlled                                   Status post pulmonary embolism    Outpatient Encounter Medications as of 8/31/2021   Medication Sig Dispense Refill    guaiFENesin 400 MG tablet Take 400 mg by mouth 4 times daily as needed for Cough      furosemide (LASIX) 20 MG tablet Take 1 tablet by mouth daily 90 tablet 0    isosorbide mononitrate (IMDUR) 30 MG extended release tablet Take 1 tablet by mouth daily 90 tablet 0    loratadine (CLARITIN) 10 MG tablet Take 1 tablet by mouth daily 90 tablet 0    metoprolol succinate (TOPROL XL) 50 MG extended release tablet Take 1 tablet by mouth 2 times daily 180 tablet 0    montelukast (SINGULAIR) 10 MG tablet Take 1 tablet by mouth daily 90 tablet 0    pantoprazole (PROTONIX) 40 MG tablet Take 1 tablet by mouth daily 90 tablet 0    lubiprostone (AMITIZA) 8 MCG CAPS capsule Take 1 capsule by mouth 2 times daily (with meals) 60 capsule 1    Cholecalciferol (VITAMIN D3) 125 MCG (5000 UT) TABS Take 1 tablet by mouth daily 90 tablet 0    Evolocumab 140 MG/ML SOAJ 1 SC EVERY 2 WEEKS FOR CHOLESTEROL 2 mL 3    acetaminophen (TYLENOL 8 HOUR ARTHRITIS PAIN) 650 MG extended release tablet Take 650 mg by mouth 2 times daily      mometasone (NASONEX) 50 MCG/ACT nasal spray 1 spray by Each Nostril route daily      Albuterol Sulfate (PROAIR HFA IN) Inhale 2 puffs into the lungs 4 times daily      azelastine (ASTELIN) 0.1 % nasal spray 1 spray by Nasal route 2 times daily Use in each nostril as directed      aspirin 325 MG tablet Take 325 mg by mouth daily      ibuprofen (ADVIL;MOTRIN) 200 MG tablet Take 200 mg by mouth every 6 hours as needed for Pain      senna (SENOKOT) 8.6 MG tablet Take 2 tablets by mouth daily      nitroGLYCERIN (NITROSTAT) 0.4 MG SL tablet Place 0.4 mg under the tongue every 5 minutes as needed for Chest pain up to max of 3 total doses. If no relief after 1 dose, call 911.  [DISCONTINUED] loratadine (CLARITIN) 10 MG tablet Take 1 tablet by mouth daily 90 tablet 0    [DISCONTINUED] pantoprazole (PROTONIX) 40 MG tablet Take 1 tablet by mouth daily 90 tablet 0    [DISCONTINUED] montelukast (SINGULAIR) 10 MG tablet Take 1 tablet by mouth daily 90 tablet 0    [DISCONTINUED] metoprolol succinate (TOPROL XL) 50 MG extended release tablet Take 1 tablet by mouth 2 times daily 90 tablet 0    [DISCONTINUED] isosorbide mononitrate (IMDUR) 30 MG extended release tablet Take 1 tablet by mouth daily 90 tablet 0    [DISCONTINUED] furosemide (LASIX) 20 MG tablet Take 1 tablet by mouth daily 90 tablet 0     No facility-administered encounter medications on file as of 8/31/2021. [facility] was [facility] for hypertension and other. 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

## 2021-10-14 ENCOUNTER — TELEPHONE (OUTPATIENT)
Dept: CARDIOLOGY CLINIC | Age: 81
End: 2021-10-14

## 2021-10-14 NOTE — TELEPHONE ENCOUNTER
I had at that, in her case because of her CABG she has to be on aspirin indefinitely, baby aspirin is fine she does not have to take a full aspirin

## 2021-10-14 NOTE — TELEPHONE ENCOUNTER
Patient heard on the news yesterday that some people should not be taking ASA.   She is taking 325mg and asking if she should continue

## 2021-11-01 ENCOUNTER — OFFICE VISIT (OUTPATIENT)
Dept: PRIMARY CARE CLINIC | Age: 81
End: 2021-11-01
Payer: MEDICARE

## 2021-11-01 VITALS
HEART RATE: 97 BPM | DIASTOLIC BLOOD PRESSURE: 78 MMHG | SYSTOLIC BLOOD PRESSURE: 124 MMHG | TEMPERATURE: 97.5 F | OXYGEN SATURATION: 97 % | RESPIRATION RATE: 17 BRPM | HEIGHT: 63 IN | BODY MASS INDEX: 39.97 KG/M2 | WEIGHT: 225.6 LBS

## 2021-11-01 DIAGNOSIS — K58.1 IRRITABLE BOWEL SYNDROME WITH CONSTIPATION: ICD-10-CM

## 2021-11-01 DIAGNOSIS — I10 ESSENTIAL HYPERTENSION: ICD-10-CM

## 2021-11-01 DIAGNOSIS — E78.5 HYPERLIPIDEMIA LDL GOAL <100: ICD-10-CM

## 2021-11-01 DIAGNOSIS — I50.31 DIASTOLIC CHF, ACUTE (HCC): ICD-10-CM

## 2021-11-01 DIAGNOSIS — R23.2 HOT FLASHES: ICD-10-CM

## 2021-11-01 DIAGNOSIS — I25.728 CORONARY ARTERY DISEASE OF AUTOLOGOUS BYPASS GRAFT WITH STABLE ANGINA PECTORIS (HCC): Primary | ICD-10-CM

## 2021-11-01 PROCEDURE — 90694 VACC AIIV4 NO PRSRV 0.5ML IM: CPT | Performed by: INTERNAL MEDICINE

## 2021-11-01 PROCEDURE — 99213 OFFICE O/P EST LOW 20 MIN: CPT | Performed by: INTERNAL MEDICINE

## 2021-11-01 PROCEDURE — G0008 ADMIN INFLUENZA VIRUS VAC: HCPCS | Performed by: INTERNAL MEDICINE

## 2021-11-01 RX ORDER — ISOSORBIDE MONONITRATE 30 MG/1
30 TABLET, EXTENDED RELEASE ORAL DAILY
Qty: 90 TABLET | Refills: 0 | Status: SHIPPED
Start: 2021-11-01 | End: 2022-02-01 | Stop reason: SDUPTHER

## 2021-11-01 RX ORDER — LUBIPROSTONE 8 UG/1
8 CAPSULE, GELATIN COATED ORAL 2 TIMES DAILY WITH MEALS
Qty: 60 CAPSULE | Refills: 1 | Status: SHIPPED
Start: 2021-11-01 | End: 2022-02-15

## 2021-11-01 RX ORDER — MONTELUKAST SODIUM 10 MG/1
10 TABLET ORAL DAILY
Qty: 90 TABLET | Refills: 0 | Status: SHIPPED
Start: 2021-11-01 | End: 2022-02-01 | Stop reason: SDUPTHER

## 2021-11-01 RX ORDER — ASPIRIN 81 MG/1
325 TABLET ORAL DAILY
COMMUNITY
End: 2022-10-18

## 2021-11-01 RX ORDER — METOPROLOL SUCCINATE 50 MG/1
50 TABLET, EXTENDED RELEASE ORAL 2 TIMES DAILY
Qty: 180 TABLET | Refills: 0 | Status: SHIPPED
Start: 2021-11-01 | End: 2022-02-01 | Stop reason: SDUPTHER

## 2021-11-01 RX ORDER — LORATADINE 10 MG/1
10 TABLET ORAL DAILY
Qty: 90 TABLET | Refills: 0 | Status: SHIPPED
Start: 2021-11-01 | End: 2022-02-01 | Stop reason: SDUPTHER

## 2021-11-01 RX ORDER — FUROSEMIDE 20 MG/1
20 TABLET ORAL DAILY
Qty: 90 TABLET | Refills: 0 | Status: SHIPPED
Start: 2021-11-01 | End: 2022-02-01 | Stop reason: SDUPTHER

## 2021-11-01 RX ORDER — PANTOPRAZOLE SODIUM 40 MG/1
40 TABLET, DELAYED RELEASE ORAL DAILY
Qty: 90 TABLET | Refills: 0 | Status: SHIPPED
Start: 2021-11-01 | End: 2022-02-01 | Stop reason: SDUPTHER

## 2021-11-01 NOTE — PROGRESS NOTES
incontinence, no urinary urgency, no nocturia, no dysuria    Vitals:    11/01/21 1002   BP: 124/78   Pulse: 97   Resp: 17   Temp: 97.5 °F (36.4 °C)   SpO2: 97%   Weight: 225 lb 9.6 oz (102.3 kg)   Height: 5' 3\" (1.6 m)       General:  Patient alert and oriented x 3, NAD, pleasant  HEENT:  Atraumatic, normocephalic, PERRLA, EOMI, clear conjunctiva, TMs clear, nose-clear, throat - no erythema  Neck:  Supple, no goiter, no carotid bruits, no LAD  Lungs:  CTA   Heart:  RRR, no murmurs, gallops or rubs  Abdomen:  Soft/nt/nd, + bowel sounds  Extremities:  No clubbing, cyanosis or edema  Skin: unremarkable    Hemoglobin A1C   Date Value Ref Range Status   05/27/2020 6.0 % Final     Cholesterol, Total   Date Value Ref Range Status   08/25/2021 141 0 - 199 mg/dL Final     Triglycerides   Date Value Ref Range Status   08/25/2021 135 0 - 149 mg/dL Final     HDL   Date Value Ref Range Status   08/25/2021 42 >40 mg/dL Final     LDL Calculated   Date Value Ref Range Status   08/25/2021 72 0 - 99 mg/dL Final     VLDL Cholesterol Calculated   Date Value Ref Range Status   08/25/2021 27 mg/dL Final     Sodium   Date Value Ref Range Status   08/25/2021 141 132 - 146 mmol/L Final     Potassium   Date Value Ref Range Status   08/25/2021 4.9 3.5 - 5.0 mmol/L Final     Chloride   Date Value Ref Range Status   08/25/2021 104 98 - 107 mmol/L Final     CO2   Date Value Ref Range Status   08/25/2021 22 22 - 29 mmol/L Final     BUN   Date Value Ref Range Status   08/25/2021 17 6 - 23 mg/dL Final     CREATININE   Date Value Ref Range Status   08/25/2021 0.7 0.5 - 1.0 mg/dL Final     Glucose   Date Value Ref Range Status   08/25/2021 101 (H) 74 - 99 mg/dL Final     Calcium   Date Value Ref Range Status   08/25/2021 10.7 (H) 8.6 - 10.2 mg/dL Final     Total Protein   Date Value Ref Range Status   08/25/2021 7.6 6.4 - 8.3 g/dL Final     Albumin   Date Value Ref Range Status   08/25/2021 4.6 3.5 - 5.2 g/dL Final     Total Bilirubin   Date Value Ref Range Status   08/25/2021 0.2 0.0 - 1.2 mg/dL Final     Alkaline Phosphatase   Date Value Ref Range Status   08/25/2021 50 35 - 104 U/L Final     AST   Date Value Ref Range Status   08/25/2021 25 0 - 31 U/L Final     ALT   Date Value Ref Range Status   08/25/2021 20 0 - 32 U/L Final     GFR Non-   Date Value Ref Range Status   08/25/2021 >60 >=60 mL/min/1.73 Final     Comment:     Chronic Kidney Disease: less than 60 ml/min/1.73 sq.m. Kidney Failure: less than 15 ml/min/1.73 sq.m. Results valid for patients 18 years and older. GFR    Date Value Ref Range Status   08/25/2021 >60  Final        No results found.      Assessment/Plan:      Outpatient Encounter Medications as of 11/1/2021   Medication Sig Dispense Refill    aspirin 81 MG EC tablet Take 81 mg by mouth daily      furosemide (LASIX) 20 MG tablet Take 1 tablet by mouth daily 90 tablet 0    isosorbide mononitrate (IMDUR) 30 MG extended release tablet Take 1 tablet by mouth daily 90 tablet 0    loratadine (CLARITIN) 10 MG tablet Take 1 tablet by mouth daily 90 tablet 0    metoprolol succinate (TOPROL XL) 50 MG extended release tablet Take 1 tablet by mouth 2 times daily 180 tablet 0    montelukast (SINGULAIR) 10 MG tablet Take 1 tablet by mouth daily 90 tablet 0    pantoprazole (PROTONIX) 40 MG tablet Take 1 tablet by mouth daily 90 tablet 0    lubiprostone (AMITIZA) 8 MCG CAPS capsule Take 1 capsule by mouth 2 times daily (with meals) 60 capsule 1    guaiFENesin 400 MG tablet Take 400 mg by mouth 4 times daily as needed for Cough      Cholecalciferol (VITAMIN D3) 125 MCG (5000 UT) TABS Take 1 tablet by mouth daily 90 tablet 0    Evolocumab 140 MG/ML SOAJ 1 SC EVERY 2 WEEKS FOR CHOLESTEROL 2 mL 3    acetaminophen (TYLENOL 8 HOUR ARTHRITIS PAIN) 650 MG extended release tablet Take 650 mg by mouth 2 times daily      mometasone (NASONEX) 50 MCG/ACT nasal spray 1 spray by Each Nostril route daily 50 MG extended release tablet; Take 1 tablet by mouth 2 times daily  -     Comprehensive Metabolic Panel; Future    Essential hypertension  -     metoprolol succinate (TOPROL XL) 50 MG extended release tablet; Take 1 tablet by mouth 2 times daily  -     Comprehensive Metabolic Panel; Future  -     Urinalysis; Future  -     Microalbumin / Creatinine Urine Ratio; Future    Irritable bowel syndrome with constipation  -     lubiprostone (AMITIZA) 8 MCG CAPS capsule; Take 1 capsule by mouth 2 times daily (with meals)  -     C-Reactive Protein; Future  -     Sedimentation Rate; Future    Hyperlipidemia LDL goal <100  -     Lipid Panel; Future    Hot flashes  -     TSH without Reflex; Future  -     Vitamin D 25 Hydroxy; Future  -     C-Reactive Protein; Future  -     Sedimentation Rate; Future    Other orders  -     loratadine (CLARITIN) 10 MG tablet; Take 1 tablet by mouth daily  -     montelukast (SINGULAIR) 10 MG tablet; Take 1 tablet by mouth daily  -     pantoprazole (PROTONIX) 40 MG tablet; Take 1 tablet by mouth daily  -     INFLUENZA, QUADV, ADJUVANTED, 65 YRS =, IM, PF, PREFILL SYR, 0.5ML (FLUAD)         There are no Patient Instructions on file for this visit. On this date 11/1/2021 I have spent 25 minutes reviewing previous notes, test results and face to face with the patient discussing the diagnosis and importance of compliance with the treatment plan as well as documenting on the day of the visit.       Lidia Willams MD   11/1/21

## 2022-01-02 NOTE — PROGRESS NOTES
[facility] Cardiology Progress Note  Dr. Oanh Dupree      Referring Physician: Nain Quan MD  CHIEF COMPLAINT:   Chief Complaint   Patient presents with    Coronary Artery Disease     6 month check . Patient denies any cp sob or dizziness. HISTORY OF PRESENT ILLNESS:    Patient is [de-identified]year old female with history of coronary artery disease, hyperlipidemia, diabetes mellitus, GERD, diastolic CHF, pulmonary embolism, is here for follow-up appointment. Shortness of breath is at baseline, patient denies any chest pain, no lightheadedness, no dizziness, no palpitations, no pedal edema, no PND, no orthopnea, no syncope, no presyncopal episodes. Functional capacity is at baseline  Complaining of back and leg pain    Past Medical History:   Diagnosis Date    Arthritis     shoulders, hips and knees, fingers    Bronchitis     CAD (coronary artery disease)     some blockage, being treated with aspirin    GERD (gastroesophageal reflux disease)     Hyperlipidemia     Hypertension     Sinus disorder          Past Surgical History:   Procedure Laterality Date    APPENDECTOMY      APPENDECTOMY      BLADDER SURGERY      BLADDER SUSPENSION      CARDIAC SURGERY      CHOLECYSTECTOMY      CHOLECYSTECTOMY      COLONOSCOPY  11/29/12    diverticulosis, hemorrhoid    COLONOSCOPY  september 2015    CORONARY ANGIOPLASTY  10/11/2019    Angioplasty LAD by Dr Esa Fox GRAFT  06/17/2019    Bypass x4.   [facility].  LIMB-LAD, SVG-OM1 SVG-OM2 SVG-RCA    CYST REMOVAL      back    DILATION AND CURETTAGE OF UTERUS      HYSTERECTOMY           Current Outpatient Medications   Medication Sig Dispense Refill    aspirin 81 MG EC tablet Take 81 mg by mouth daily      furosemide (LASIX) 20 MG tablet Take 1 tablet by mouth daily 90 tablet 0    isosorbide mononitrate (IMDUR) 30 MG extended release tablet Take 1 tablet by mouth daily 90 tablet 0    loratadine (CLARITIN) 10 MG tablet Take 1 tablet by mouth History    Not on file   Tobacco Use    Smoking status: Former Smoker     Packs/day: 1.00     Years: 37.00     Pack years: 37.00     Start date: 57     Quit date:      Years since quittin.0    Smokeless tobacco: Never Used   Vaping Use    Vaping Use: Never used   Substance and Sexual Activity    Alcohol use: No     Comment: 2 cup of coffee a day     Drug use: No    Sexual activity: Not on file   Other Topics Concern    Not on file   Social History Narrative    Not on file     Social Determinants of Health     Financial Resource Strain: Low Risk     Difficulty of Paying Living Expenses: Not hard at all   Food Insecurity: No Food Insecurity    Worried About [address] in the Last Year: Never true    [address]  [employer] in the Last Year: Never true   Transportation Needs:     Lack of Transportation (Medical): Not on file    Lack of Transportation (Non-Medical):  Not on file   Physical Activity:     Days of Exercise per Week: Not on file    Minutes of Exercise per Session: Not on file   Stress:     Feeling of Stress : Not on file   Social Connections:     Frequency of Communication with Friends and Family: Not on file    Frequency of Social Gatherings with Friends and Family: Not on file    Attends Confucianism Services: Not on file    Active Member of [address] [place] [address] or Organizations: Not on file    Attends Club or Organization Meetings: Not on file    Marital Status: Not on file   Intimate Partner Violence:     Fear of Current or Ex-Partner: Not on file    Emotionally Abused: Not on file    Physically Abused: Not on file    Sexually Abused: Not on file   Housing Stability:     Unable to Pay for Housing in the Last Year: Not on file    Number of [place] in the Last Year: Not on file    Unstable Housing in the Last Year: Not on file       Family History   Problem Relation Age of Onset    Cancer Mother     Stroke Father        REVIEW OF SYSTEMS:     CONSTITUTIONAL:  negative for fevers, chills, sweats and fatigue  HEENT:  negative for  tinnitus, earaches, nasal congestion and epistaxis  RESPIRATORY:  negative for  dry cough, cough with sputum, wheezing and hemoptysis  GASTROINTESTINAL:  negative for nausea, vomiting, diarrhea, constipation, pruritus and jaundice  HEMATOLOGIC/LYMPHATIC:  negative for easy bruising, bleeding, lymphadenopathy and petechiae  ENDOCRINE:  negative for heat intolerance, cold intolerance, tremor, hair loss and diabetic symptoms including neither polyuria nor polydipsia nor blurred vision  MUSCULOSKELETAL: Back and leg pain  NEUROLOGICAL:  negative for memory problems, speech problems, visual disturbance, dysphagia, weakness and numbness      PHYSICAL EXAM:   CONSTITUTIONAL:  awake, alert, cooperative, no apparent distress, and appears stated age  HEENT:  Moist and pink mucous membranes, normocephalic, without obvious abnormality, atraumatic  NECK:  Supple, symmetrical, trachea midline, no adenopathy, thyroid symmetric, not enlarged and no tenderness, skin normal  LUNGS:  No increased work of breathing, good air exchange, clear to auscultation bilaterally, no crackles or wheezing  CARDIOVASCULAR:  Normal apical impulse, regular rate and rhythm, normal S1 and S2, no S3 or S4,no murmur,and no pedal edema, no carotid bruit, no JVD, no pulsating masses. ABDOMEN:soft, nontender, no hepatomegaly, no splenomegaly, bowel sounds positive. CHEST:expands symmetrically, nontender to palpation  MUSCULOSKELETAL:  No clubbing, no cyanosis, no pedal edema,there is no redness, warmth, or swelling of the joints  full range of motion noted. NEUROLOGIC:  Alert, awake, oriented ×3.   SKIN: no bruises, no bleeding, normal skin color, texture, turgor and no redness, warmth, or swelling      /72 (Site: Right Upper Arm, Position: Sitting, Cuff Size: Large Adult)   Pulse 62   Ht 5' 3\" (1.6 m)   Wt 224 lb (101.6 kg)   BMI 39.68 kg/m²     DATA:   I personally reviewed the visit EKG with the following interpretation: Sinus rhythm, old anterior wall MI age undetermined, nonspecific T wave changes    EKG 5/28/21  Sinus bradycardia, low voltage QRS, possible old anteroseptal wall MI age undetermined    ECHO: 7/3/19 Summary   Left ventricle grossly normal in size. Normal LV segmental wall motion. Normal left ventricular wall thickness. Estimated left ventricular ejection fraction is 67±5%. <50% criteria for diastolic dysfunction. The LAESV Index is <34ml/m2. Normal right ventricular size and function   Physiologic and/or trace mitral regurgitation is present. The tricuspid valve appears structurally normal.   RVSP is 30 mmHg. Physiologic and/or trace tricuspid regurgitation. There is a small pericardial effusion. Left pleural effusion. Technically fair quality study. No comparison study available. Suggest clinical correlation. Stress Test:5/13/2019) Clinical: Negative for Regadenoson induced angina. ECG: Negative for Regadenoson induced ischemia. Nuclear scan: a. Moderate size, severely intense, reversible ischemia involving the anterior wall and apex. b. Normal wall motion  c. Normal ejection fraction. Angiography: 10/11/19 1. Unsuccessful attempt to heavily calcified lesion of left circumflex  artery and diagonal branch. 2.  Totally occluded LAD with patent LIMA to LAD. 3.  Significant calcified lesion at the proximal first diagonal branch. 4.  Significant disease involving OM1, OM2 and borderline lesion  involving the proximal circumflex artery and significant lesion  involving the distal left circumflex artery. 5.  Totally occluded RCA. 6.  Totally occluded SVG graft to OM1, OM2.  7.  Totally occluded SVG graft to RCA. 8.  Patent LIMA to distal LAD with mildly to moderately diffusely  diseased LAD distal to LIMA to LAD anastomosis.     CABG - (6/17/2019) Coronary artery bypass grafting times four, LIMA to the LAD, sequential vein bypass graft to the first and second obtuse marginal artery. Saphenous vein bypass graft to the posterior descending artery. Cardiology Labs: BMP:    Lab Results   Component Value Date     08/25/2021    K 4.9 08/25/2021     08/25/2021    CO2 22 08/25/2021    BUN 17 08/25/2021    CREATININE 0.7 08/25/2021     CMP:    Lab Results   Component Value Date     08/25/2021    K 4.9 08/25/2021     08/25/2021    CO2 22 08/25/2021    BUN 17 08/25/2021    CREATININE 0.7 08/25/2021    PROT 7.6 08/25/2021     CBC:    Lab Results   Component Value Date    WBC 7.1 08/25/2021    RBC 4.58 08/25/2021    HGB 14.4 08/25/2021    HCT 45.5 08/25/2021    MCV 99.3 08/25/2021    RDW 12.7 08/25/2021     08/25/2021     PT/INR:  No results found for: PTINR  PT/INR Warfarin:  No components found for: PTPATWAR, PTINRWAR  PTT:    Lab Results   Component Value Date    APTT 37.7 10/09/2019     PTT Heparin:  No components found for: APTTHEP  Magnesium:  No results found for: MG  TSH:    Lab Results   Component Value Date    TSH 1.523 10/26/2010     TROPONIN:  No components found for: TROP  BNP:  No results found for: BNP  FASTING LIPID PANEL:    Lab Results   Component Value Date    CHOL 141 08/25/2021    HDL 42 08/25/2021    TRIG 135 08/25/2021     No orders to display     I have personally reviewed the laboratory, cardiac diagnostic and radiographic testing as outlined above:      IMPRESSION:  1.  CAD: Status post CABG with LIMA to LAD sequential SVG to M1 and M2, SVG to right PDA, had cardiac catheterization on 10/11/2019 with the following findings:   *  Unsuccessful attempt to heavily calcified lesion of left circumflex artery and diagonal branch. *  Totally occluded LAD with patent LIMA to LAD. *  Significant calcified lesion at the proximal first diagonal branch.   *  Significant disease involving OM1, OM2 and borderline lesion involving the proximal circumflex artery and significant lesion involving the distal left circumflex artery. *  Totally occluded RCA. *  Totally occluded SVG graft to OM1, OM2.  *  Totally occluded SVG graft to RCA. *  Patent LIMA to distal LAD with mildly to moderately diffusely diseased LAD distal to LIMA to LAD anastomosis. [facility] for [facility], continuing medical therapy was advised, will continue current treatment  2. Hypertension: Controlled, continue current treatment. 3.  Hyperlipidemi: On Repatha  4. Personal history of pulmonary embolism   5. Back and leg pain: Doubt related to Repatha, suspect musculoskeletal    RECOMMENDATIONS:   1. Continue current treatment  2. Preventive cardiology: Low-salt, low-cholesterol diet, daily exercise, total cholesterol of less than 200, LDL of less than 70, were all advised. 3.  Follow-up with Dr. Christie Bradford as scheduled  4. Follow-up with Dr. Orellana Sportsjeannie in 6 months, sooner if symptomatic for any reason    I have reviewed my findings and recommendations with patient    Electronically signed by Tyrone Macedo MD on 1/3/2022 at 6:39 PM  NOTE: This report was transcribed using voice recognition software.  Every effort was made to ensure accuracy; however, inadvertent computerized transcription errors may be present

## 2022-01-03 ENCOUNTER — OFFICE VISIT (OUTPATIENT)
Dept: CARDIOLOGY CLINIC | Age: 82
End: 2022-01-03
Payer: MEDICARE

## 2022-01-03 VITALS
HEART RATE: 62 BPM | DIASTOLIC BLOOD PRESSURE: 72 MMHG | WEIGHT: 224 LBS | HEIGHT: 63 IN | BODY MASS INDEX: 39.69 KG/M2 | SYSTOLIC BLOOD PRESSURE: 138 MMHG

## 2022-01-03 DIAGNOSIS — I25.10 CORONARY ARTERY DISEASE INVOLVING NATIVE CORONARY ARTERY OF NATIVE HEART WITHOUT ANGINA PECTORIS: Primary | ICD-10-CM

## 2022-01-03 PROCEDURE — 99214 OFFICE O/P EST MOD 30 MIN: CPT | Performed by: INTERNAL MEDICINE

## 2022-01-03 PROCEDURE — 93000 ELECTROCARDIOGRAM COMPLETE: CPT | Performed by: INTERNAL MEDICINE

## 2022-01-25 DIAGNOSIS — E78.5 HYPERLIPIDEMIA LDL GOAL <100: ICD-10-CM

## 2022-01-25 DIAGNOSIS — I50.31 DIASTOLIC CHF, ACUTE (HCC): ICD-10-CM

## 2022-01-25 DIAGNOSIS — R23.2 HOT FLASHES: ICD-10-CM

## 2022-01-25 DIAGNOSIS — K58.1 IRRITABLE BOWEL SYNDROME WITH CONSTIPATION: ICD-10-CM

## 2022-01-25 DIAGNOSIS — I25.728 CORONARY ARTERY DISEASE OF AUTOLOGOUS BYPASS GRAFT WITH STABLE ANGINA PECTORIS (HCC): ICD-10-CM

## 2022-01-25 DIAGNOSIS — I10 ESSENTIAL HYPERTENSION: ICD-10-CM

## 2022-01-25 LAB
ALBUMIN SERPL-MCNC: 4.5 G/DL (ref 3.5–5.2)
ALP BLD-CCNC: 64 U/L (ref 35–104)
ALT SERPL-CCNC: 18 U/L (ref 0–32)
ANION GAP SERPL CALCULATED.3IONS-SCNC: 11 MMOL/L (ref 7–16)
AST SERPL-CCNC: 18 U/L (ref 0–31)
BILIRUB SERPL-MCNC: 0.3 MG/DL (ref 0–1.2)
BILIRUBIN URINE: NEGATIVE
BLOOD, URINE: NEGATIVE
BUN BLDV-MCNC: 20 MG/DL (ref 6–23)
C-REACTIVE PROTEIN: 0.5 MG/DL (ref 0–0.4)
CALCIUM SERPL-MCNC: 10.8 MG/DL (ref 8.6–10.2)
CHLORIDE BLD-SCNC: 100 MMOL/L (ref 98–107)
CHOLESTEROL, TOTAL: 136 MG/DL (ref 0–199)
CLARITY: CLEAR
CO2: 27 MMOL/L (ref 22–29)
COLOR: YELLOW
CREAT SERPL-MCNC: 0.8 MG/DL (ref 0.5–1)
CREATININE URINE: 106 MG/DL (ref 29–226)
GFR AFRICAN AMERICAN: >60
GFR NON-AFRICAN AMERICAN: >60 ML/MIN/1.73
GLUCOSE BLD-MCNC: 106 MG/DL (ref 74–99)
GLUCOSE URINE: NEGATIVE MG/DL
HDLC SERPL-MCNC: 43 MG/DL
KETONES, URINE: NEGATIVE MG/DL
LDL CHOLESTEROL CALCULATED: 63 MG/DL (ref 0–99)
LEUKOCYTE ESTERASE, URINE: NEGATIVE
MICROALBUMIN UR-MCNC: 18.2 MG/L
MICROALBUMIN/CREAT UR-RTO: 17.2 (ref 0–30)
NITRITE, URINE: NEGATIVE
PH UA: 7.5 (ref 5–9)
POTASSIUM SERPL-SCNC: 4.7 MMOL/L (ref 3.5–5)
PROTEIN UA: NEGATIVE MG/DL
SODIUM BLD-SCNC: 138 MMOL/L (ref 132–146)
SPECIFIC GRAVITY UA: 1.01 (ref 1–1.03)
TOTAL PROTEIN: 7.7 G/DL (ref 6.4–8.3)
TRIGL SERPL-MCNC: 149 MG/DL (ref 0–149)
TSH SERPL DL<=0.05 MIU/L-ACNC: 2.22 UIU/ML (ref 0.27–4.2)
UROBILINOGEN, URINE: 0.2 E.U./DL
VITAMIN D 25-HYDROXY: 41 NG/ML (ref 30–100)
VLDLC SERPL CALC-MCNC: 30 MG/DL

## 2022-01-26 LAB — SEDIMENTATION RATE, ERYTHROCYTE: 67 MM/HR (ref 0–20)

## 2022-02-01 ENCOUNTER — OFFICE VISIT (OUTPATIENT)
Dept: PRIMARY CARE CLINIC | Age: 82
End: 2022-02-01
Payer: MEDICARE

## 2022-02-01 VITALS
HEIGHT: 63 IN | SYSTOLIC BLOOD PRESSURE: 138 MMHG | DIASTOLIC BLOOD PRESSURE: 78 MMHG | HEART RATE: 62 BPM | WEIGHT: 219 LBS | BODY MASS INDEX: 38.8 KG/M2 | TEMPERATURE: 97.4 F | OXYGEN SATURATION: 98 %

## 2022-02-01 DIAGNOSIS — N23 RENAL PAIN: Primary | ICD-10-CM

## 2022-02-01 DIAGNOSIS — I25.728 CORONARY ARTERY DISEASE OF AUTOLOGOUS BYPASS GRAFT WITH STABLE ANGINA PECTORIS (HCC): ICD-10-CM

## 2022-02-01 DIAGNOSIS — I10 ESSENTIAL HYPERTENSION: ICD-10-CM

## 2022-02-01 DIAGNOSIS — I50.31 DIASTOLIC CHF, ACUTE (HCC): ICD-10-CM

## 2022-02-01 DIAGNOSIS — K58.1 IRRITABLE BOWEL SYNDROME WITH CONSTIPATION: ICD-10-CM

## 2022-02-01 PROCEDURE — 99214 OFFICE O/P EST MOD 30 MIN: CPT | Performed by: INTERNAL MEDICINE

## 2022-02-01 RX ORDER — FUROSEMIDE 20 MG/1
20 TABLET ORAL DAILY
Qty: 90 TABLET | Refills: 0 | Status: SHIPPED
Start: 2022-02-01 | End: 2022-04-19 | Stop reason: SDUPTHER

## 2022-02-01 RX ORDER — MONTELUKAST SODIUM 10 MG/1
10 TABLET ORAL DAILY
Qty: 90 TABLET | Refills: 0 | Status: SHIPPED
Start: 2022-02-01 | End: 2022-04-19 | Stop reason: SDUPTHER

## 2022-02-01 RX ORDER — TIZANIDINE 4 MG/1
4 TABLET ORAL NIGHTLY
Qty: 20 TABLET | Refills: 0 | Status: SHIPPED
Start: 2022-02-01 | End: 2022-06-14 | Stop reason: ALTCHOICE

## 2022-02-01 RX ORDER — ISOSORBIDE MONONITRATE 30 MG/1
30 TABLET, EXTENDED RELEASE ORAL DAILY
Qty: 90 TABLET | Refills: 0 | Status: SHIPPED
Start: 2022-02-01 | End: 2022-04-19 | Stop reason: SDUPTHER

## 2022-02-01 RX ORDER — PANTOPRAZOLE SODIUM 40 MG/1
40 TABLET, DELAYED RELEASE ORAL DAILY
Qty: 90 TABLET | Refills: 0 | Status: SHIPPED
Start: 2022-02-01 | End: 2022-04-19 | Stop reason: SDUPTHER

## 2022-02-01 RX ORDER — LORATADINE 10 MG/1
10 TABLET ORAL DAILY
Qty: 90 TABLET | Refills: 0 | Status: SHIPPED
Start: 2022-02-01 | End: 2022-04-19 | Stop reason: SDUPTHER

## 2022-02-01 RX ORDER — METOPROLOL SUCCINATE 50 MG/1
50 TABLET, EXTENDED RELEASE ORAL 2 TIMES DAILY
Qty: 180 TABLET | Refills: 0 | Status: SHIPPED
Start: 2022-02-01 | End: 2022-04-19 | Stop reason: SDUPTHER

## 2022-02-01 NOTE — PROGRESS NOTES
tenderness. Exquisite pain upon laying flat or sitting up. Extremities:  No clubbing, cyanosis or edema  Skin: unremarkable    Hemoglobin A1C   Date Value Ref Range Status   05/27/2020 6.0 % Final     Cholesterol, Total   Date Value Ref Range Status   01/25/2022 136 0 - 199 mg/dL Final     Triglycerides   Date Value Ref Range Status   01/25/2022 149 0 - 149 mg/dL Final     HDL   Date Value Ref Range Status   01/25/2022 43 >40 mg/dL Final     LDL Calculated   Date Value Ref Range Status   01/25/2022 63 0 - 99 mg/dL Final     VLDL Cholesterol Calculated   Date Value Ref Range Status   01/25/2022 30 mg/dL Final     Sodium   Date Value Ref Range Status   01/25/2022 138 132 - 146 mmol/L Final     Potassium   Date Value Ref Range Status   01/25/2022 4.7 3.5 - 5.0 mmol/L Final     Chloride   Date Value Ref Range Status   01/25/2022 100 98 - 107 mmol/L Final     CO2   Date Value Ref Range Status   01/25/2022 27 22 - 29 mmol/L Final     BUN   Date Value Ref Range Status   01/25/2022 20 6 - 23 mg/dL Final     CREATININE   Date Value Ref Range Status   01/25/2022 0.8 0.5 - 1.0 mg/dL Final     Glucose   Date Value Ref Range Status   01/25/2022 106 (H) 74 - 99 mg/dL Final     Calcium   Date Value Ref Range Status   01/25/2022 10.8 (H) 8.6 - 10.2 mg/dL Final     Total Protein   Date Value Ref Range Status   01/25/2022 7.7 6.4 - 8.3 g/dL Final     Albumin   Date Value Ref Range Status   01/25/2022 4.5 3.5 - 5.2 g/dL Final     Total Bilirubin   Date Value Ref Range Status   01/25/2022 0.3 0.0 - 1.2 mg/dL Final     Alkaline Phosphatase   Date Value Ref Range Status   01/25/2022 64 35 - 104 U/L Final     AST   Date Value Ref Range Status   01/25/2022 18 0 - 31 U/L Final     ALT   Date Value Ref Range Status   01/25/2022 18 0 - 32 U/L Final     GFR Non-   Date Value Ref Range Status   01/25/2022 >60 >=60 mL/min/1.73 Final     Comment:     Chronic Kidney Disease: less than 60 ml/min/1.73 sq.m.           Kidney Failure: less than 15 ml/min/1.73 sq.m. Results valid for patients 18 years and older. GFR    Date Value Ref Range Status   01/25/2022 >60  Final        No results found.      Assessment/Plan:  Right low back/renal angle and flank pain with movement, rule out musculoskeletal.  Hypertension controlled  Coronary artery disease  Hyperlipidemia      Outpatient Encounter Medications as of 2/1/2022   Medication Sig Dispense Refill    Cholecalciferol (VITAMIN D3) 125 MCG (5000 UT) TABS Take 1 tablet by mouth daily 90 tablet 0    Evolocumab 140 MG/ML SOAJ 1 SC EVERY 2 WEEKS FOR CHOLESTEROL 2 mL 3    furosemide (LASIX) 20 MG tablet Take 1 tablet by mouth daily 90 tablet 0    isosorbide mononitrate (IMDUR) 30 MG extended release tablet Take 1 tablet by mouth daily 90 tablet 0    loratadine (CLARITIN) 10 MG tablet Take 1 tablet by mouth daily 90 tablet 0    metoprolol succinate (TOPROL XL) 50 MG extended release tablet Take 1 tablet by mouth 2 times daily 180 tablet 0    montelukast (SINGULAIR) 10 MG tablet Take 1 tablet by mouth daily 90 tablet 0    pantoprazole (PROTONIX) 40 MG tablet Take 1 tablet by mouth daily 90 tablet 0    tiZANidine (ZANAFLEX) 4 MG tablet Take 1 tablet by mouth at bedtime 20 tablet 0    aspirin 81 MG EC tablet Take 81 mg by mouth daily      guaiFENesin 400 MG tablet Take 400 mg by mouth 4 times daily as needed for Cough      acetaminophen (TYLENOL 8 HOUR ARTHRITIS PAIN) 650 MG extended release tablet Take 650 mg by mouth 2 times daily      mometasone (NASONEX) 50 MCG/ACT nasal spray 1 spray by Each Nostril route daily      Albuterol Sulfate (PROAIR HFA IN) Inhale 2 puffs into the lungs 4 times daily      azelastine (ASTELIN) 0.1 % nasal spray 1 spray by Nasal route 2 times daily Use in each nostril as directed      ibuprofen (ADVIL;MOTRIN) 200 MG tablet Take 200 mg by mouth every 6 hours as needed for Pain      nitroGLYCERIN (NITROSTAT) 0.4 MG SL tablet Place 0.4 mg under the tongue every 5 minutes as needed for Chest pain up to max of 3 total doses. If no relief after 1 dose, call 911.  lubiprostone (AMITIZA) 8 MCG CAPS capsule Take 1 capsule by mouth 2 times daily (with meals) (Patient not taking: Reported on 2/1/2022) 60 capsule 1    [DISCONTINUED] furosemide (LASIX) 20 MG tablet Take 1 tablet by mouth daily 90 tablet 0    [DISCONTINUED] isosorbide mononitrate (IMDUR) 30 MG extended release tablet Take 1 tablet by mouth daily 90 tablet 0    [DISCONTINUED] loratadine (CLARITIN) 10 MG tablet Take 1 tablet by mouth daily 90 tablet 0    [DISCONTINUED] metoprolol succinate (TOPROL XL) 50 MG extended release tablet Take 1 tablet by mouth 2 times daily 180 tablet 0    [DISCONTINUED] montelukast (SINGULAIR) 10 MG tablet Take 1 tablet by mouth daily 90 tablet 0    [DISCONTINUED] pantoprazole (PROTONIX) 40 MG tablet Take 1 tablet by mouth daily 90 tablet 0    [DISCONTINUED] Cholecalciferol (VITAMIN D3) 125 MCG (5000 UT) TABS Take 1 tablet by mouth daily 90 tablet 0    [DISCONTINUED] Evolocumab 140 MG/ML SOAJ 1 SC EVERY 2 WEEKS FOR CHOLESTEROL 2 mL 3     No facility-administered encounter medications on file as of 2/1/2022. Pretty Gracia was seen today for hypertension. Diagnoses and all orders for this visit:    Renal pain  -     US RETROPERITONEAL COMPLETE; Future    Diastolic CHF, acute (HCC)  -     furosemide (LASIX) 20 MG tablet; Take 1 tablet by mouth daily  -     metoprolol succinate (TOPROL XL) 50 MG extended release tablet; Take 1 tablet by mouth 2 times daily    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 (HCC)  -     isosorbide mononitrate (IMDUR) 30 MG extended release tablet; Take 1 tablet by mouth daily    Irritable bowel syndrome with constipation    Essential hypertension  -     metoprolol succinate (TOPROL XL) 50 MG extended release tablet;  Take 1 tablet by mouth 2 times daily  -     URINALYSIS; Future    Other orders  - Cholecalciferol (VITAMIN D3) 125 MCG (5000 UT) TABS; Take 1 tablet by mouth daily  -     Evolocumab 140 MG/ML SOAJ; 1 SC EVERY 2 WEEKS FOR CHOLESTEROL  -     loratadine (CLARITIN) 10 MG tablet; Take 1 tablet by mouth daily  -     montelukast (SINGULAIR) 10 MG tablet; Take 1 tablet by mouth daily  -     pantoprazole (PROTONIX) 40 MG tablet; Take 1 tablet by mouth daily  -     tiZANidine (ZANAFLEX) 4 MG tablet; Take 1 tablet by mouth at bedtime         There are no Patient Instructions on file for this visit.            Pamela Sidhu MD   2/1/22

## 2022-02-02 DIAGNOSIS — I10 ESSENTIAL HYPERTENSION: ICD-10-CM

## 2022-02-02 LAB
BILIRUBIN URINE: NEGATIVE
BLOOD, URINE: NEGATIVE
CLARITY: CLEAR
COLOR: YELLOW
GLUCOSE URINE: NEGATIVE MG/DL
KETONES, URINE: NEGATIVE MG/DL
LEUKOCYTE ESTERASE, URINE: NEGATIVE
NITRITE, URINE: NEGATIVE
PH UA: 6 (ref 5–9)
PROTEIN UA: NEGATIVE MG/DL
SPECIFIC GRAVITY UA: 1.02 (ref 1–1.03)
UROBILINOGEN, URINE: 0.2 E.U./DL

## 2022-02-08 ENCOUNTER — HOSPITAL ENCOUNTER (OUTPATIENT)
Dept: ULTRASOUND IMAGING | Age: 82
Discharge: HOME | End: 2022-02-08
Payer: MEDICARE

## 2022-02-08 DIAGNOSIS — N23 RENAL PAIN: ICD-10-CM

## 2022-02-08 PROCEDURE — 76770 US EXAM ABDO BACK WALL COMP: CPT

## 2022-02-15 ENCOUNTER — OFFICE VISIT (OUTPATIENT)
Dept: PRIMARY CARE CLINIC | Age: 82
End: 2022-02-15
Payer: MEDICARE

## 2022-02-15 VITALS
SYSTOLIC BLOOD PRESSURE: 130 MMHG | TEMPERATURE: 96.3 F | WEIGHT: 220.3 LBS | HEIGHT: 63 IN | OXYGEN SATURATION: 97 % | HEART RATE: 64 BPM | DIASTOLIC BLOOD PRESSURE: 74 MMHG | BODY MASS INDEX: 39.04 KG/M2

## 2022-02-15 DIAGNOSIS — N23 RENAL PAIN: Primary | ICD-10-CM

## 2022-02-15 DIAGNOSIS — K58.1 IRRITABLE BOWEL SYNDROME WITH CONSTIPATION: ICD-10-CM

## 2022-02-15 DIAGNOSIS — I10 PRIMARY HYPERTENSION: ICD-10-CM

## 2022-02-15 DIAGNOSIS — I10 ESSENTIAL HYPERTENSION: ICD-10-CM

## 2022-02-15 PROCEDURE — 99213 OFFICE O/P EST LOW 20 MIN: CPT | Performed by: INTERNAL MEDICINE

## 2022-02-15 RX ORDER — CHOLECALCIFEROL (VITAMIN D3) 125 MCG
CAPSULE ORAL
COMMUNITY
Start: 2022-02-01 | End: 2022-02-15

## 2022-02-15 RX ORDER — MAGNESIUM CITRATE
150 SOLUTION, ORAL ORAL ONCE
Qty: 1 EACH | Refills: 0 | Status: SHIPPED | OUTPATIENT
Start: 2022-02-15 | End: 2022-02-15

## 2022-02-15 RX ORDER — LIDOCAINE 4 G/G
1 PATCH TOPICAL DAILY
Qty: 30 PATCH | Refills: 0 | Status: SHIPPED | OUTPATIENT
Start: 2022-02-15 | End: 2022-03-17

## 2022-02-15 NOTE — PROGRESS NOTES
Chief Complaint   Patient presents with    Follow-up     had US Renal done.  Other     denies symptoms for covid. covid vaccines and booster, had flu vaccine       HPI:  Patient is here for follow-up . Patient stated the back pain is slightly better still bothers her to try to sit up for a supine position or get up out of a chair does not radiate to the groins. She has been constipated uses milk of magnesia at least 3 times a week. Patient has a history of irritable bowel syndrome constipation  Ultrasound of the kidneys and ureters negative  Urinalysis negative      Past Medical History, Surgical History, and Family History has been reviewed and updated.     Review of Systems:  Constitutional:  No fever, no fatigue, no chills, no headaches, no weight change  Dermatology:  No rash, no mole, no dry or sensitive skin  ENT:  No cough, no sore throat, no sinus pain, no runny nose, no ear pain  Cardiology:  No chest pain, no palpitations, no leg edema, no shortness of breath, no PND  Gastroenterology:  No dysphagia, no abdominal pain, no nausea, no vomiting, no constipation, no diarrhea, no heartburn  Musculoskeletal:  No joint pain, no leg cramps, no back pain, no muscle aches  Respiratory:  No shortness of breath, no orthopnea, no wheezing, no PINTO, no hemoptysis  Urology:  No blood in the urine, no urinary frequency, no urinary incontinence, no urinary urgency, no nocturia, no dysuria    Vitals:    02/15/22 1128 02/15/22 1136   BP: (!) 144/82 130/74   Site: Right Upper Arm    Position: Sitting    Cuff Size: Large Adult    Pulse: 64    Temp: 96.3 °F (35.7 °C)    SpO2: 97%    Weight: 220 lb 4.8 oz (99.9 kg)    Height: 5' 3\" (1.6 m)        General:  Patient alert and oriented x 3, NAD, pleasant  HEENT:  Atraumatic, normocephalic, PERRLA, EOMI, clear conjunctiva, TMs clear, nose-clear, throat - no erythema  Neck:  Supple, no goiter, no carotid bruits, no LAD  Lungs:  CTA   Heart:  RRR, no murmurs, gallops or than 15 ml/min/1.73 sq.m. Results valid for patients 18 years and older. GFR    Date Value Ref Range Status   01/25/2022 >60  Final        US RETROPERITONEAL COMPLETE    Result Date: 2/8/2022  EXAMINATION: RETROPERITONEAL ULTRASOUND OF THE KIDNEYS AND URINARY BLADDER 2/8/2022 COMPARISON: Renal ultrasound from June 12, 2019 HISTORY: ORDERING SYSTEM PROVIDED HISTORY: Renal pain TECHNOLOGIST PROVIDED HISTORY: Reason for exam:->right enal angle and flank pain What reading provider will be dictating this exam?->CRC FINDINGS: Kidneys: The right kidney measures 9.7 cm in length and the left kidney measures 9.6 cm in length. Corticomedullary differentiation and cortical thickness is maintained. No renal mass, renal cysts, nor intrarenal calcifications. No hydronephrosis. Bladder: No intrinsic mass, intrinsic calcification, nor wall thickening. Bilateral ureteric jets seen     Normal appearance of the bilateral kidneys and bladder. No hydronephrosis. Assessment/Plan: Back pain musculoskeletal  Apply Salonpas patches in the morning remove at night to the low back area  Irritable bowel syndrome constipation.   Patient was advised to use magnesium citrate 1 bottle x1 only  Continue on a high-fiber diet  Hypertension controlled      Outpatient Encounter Medications as of 2/15/2022   Medication Sig Dispense Refill    magnesium citrate (CITROMA) SOLN Take 150 mLs by mouth once for 1 dose 1 each 0    lidocaine 4 % external patch Place 1 patch onto the skin daily 30 patch 0    Cholecalciferol (VITAMIN D3) 125 MCG (5000 UT) TABS Take 1 tablet by mouth daily 90 tablet 0    Evolocumab 140 MG/ML SOAJ 1 SC EVERY 2 WEEKS FOR CHOLESTEROL 2 mL 3    furosemide (LASIX) 20 MG tablet Take 1 tablet by mouth daily 90 tablet 0    isosorbide mononitrate (IMDUR) 30 MG extended release tablet Take 1 tablet by mouth daily 90 tablet 0    loratadine (CLARITIN) 10 MG tablet Take 1 tablet by mouth daily 90 tablet 0  metoprolol succinate (TOPROL XL) 50 MG extended release tablet Take 1 tablet by mouth 2 times daily 180 tablet 0    montelukast (SINGULAIR) 10 MG tablet Take 1 tablet by mouth daily 90 tablet 0    pantoprazole (PROTONIX) 40 MG tablet Take 1 tablet by mouth daily 90 tablet 0    tiZANidine (ZANAFLEX) 4 MG tablet Take 1 tablet by mouth at bedtime 20 tablet 0    aspirin 81 MG EC tablet Take 81 mg by mouth daily      guaiFENesin 400 MG tablet Take 400 mg by mouth 4 times daily as needed for Cough      acetaminophen (TYLENOL 8 HOUR ARTHRITIS PAIN) 650 MG extended release tablet Take 650 mg by mouth 2 times daily      mometasone (NASONEX) 50 MCG/ACT nasal spray 1 spray by Each Nostril route daily      Albuterol Sulfate (PROAIR HFA IN) Inhale 2 puffs into the lungs 4 times daily      azelastine (ASTELIN) 0.1 % nasal spray 1 spray by Nasal route 2 times daily Use in each nostril as directed      ibuprofen (ADVIL;MOTRIN) 200 MG tablet Take 200 mg by mouth every 6 hours as needed for Pain      nitroGLYCERIN (NITROSTAT) 0.4 MG SL tablet Place 0.4 mg under the tongue every 5 minutes as needed for Chest pain up to max of 3 total doses. If no relief after 1 dose, call 911.  [DISCONTINUED] RA VITAMIN D-3 125 MCG (5000 UT) CAPS capsule take 1 capsule by mouth once daily      [DISCONTINUED] lubiprostone (AMITIZA) 8 MCG CAPS capsule Take 1 capsule by mouth 2 times daily (with meals) (Patient not taking: Reported on 2/1/2022) 60 capsule 1     No facility-administered encounter medications on file as of 2/15/2022. Jose De Jesus Resendez was seen today for follow-up and other.     Diagnoses and all orders for this visit:    Renal pain    Irritable bowel syndrome with constipation    Primary hypertension    Other orders  -     magnesium citrate (CITROMA) SOLN; Take 150 mLs by mouth once for 1 dose  -     lidocaine 4 % external patch; Place 1 patch onto the skin daily         There are no Patient Instructions on file for this visit.           Enzo Arzola MD   2/15/22

## 2022-04-19 ENCOUNTER — OFFICE VISIT (OUTPATIENT)
Dept: PRIMARY CARE CLINIC | Age: 82
End: 2022-04-19
Payer: MEDICARE

## 2022-04-19 VITALS
WEIGHT: 217.8 LBS | HEIGHT: 63 IN | BODY MASS INDEX: 38.59 KG/M2 | TEMPERATURE: 96.3 F | HEART RATE: 63 BPM | DIASTOLIC BLOOD PRESSURE: 70 MMHG | SYSTOLIC BLOOD PRESSURE: 120 MMHG | OXYGEN SATURATION: 98 %

## 2022-04-19 DIAGNOSIS — J31.0 CHRONIC RHINITIS: ICD-10-CM

## 2022-04-19 DIAGNOSIS — K58.1 IRRITABLE BOWEL SYNDROME WITH CONSTIPATION: Primary | ICD-10-CM

## 2022-04-19 DIAGNOSIS — E78.5 HYPERLIPIDEMIA LDL GOAL <100: ICD-10-CM

## 2022-04-19 DIAGNOSIS — I25.728 CORONARY ARTERY DISEASE OF AUTOLOGOUS BYPASS GRAFT WITH STABLE ANGINA PECTORIS (HCC): ICD-10-CM

## 2022-04-19 DIAGNOSIS — K21.9 GASTROESOPHAGEAL REFLUX DISEASE WITHOUT ESOPHAGITIS: ICD-10-CM

## 2022-04-19 DIAGNOSIS — M17.0 BILATERAL PRIMARY OSTEOARTHRITIS OF KNEE: ICD-10-CM

## 2022-04-19 DIAGNOSIS — I10 ESSENTIAL HYPERTENSION: ICD-10-CM

## 2022-04-19 DIAGNOSIS — I50.31 DIASTOLIC CHF, ACUTE (HCC): ICD-10-CM

## 2022-04-19 DIAGNOSIS — I10 PRIMARY HYPERTENSION: ICD-10-CM

## 2022-04-19 PROCEDURE — 99214 OFFICE O/P EST MOD 30 MIN: CPT | Performed by: INTERNAL MEDICINE

## 2022-04-19 RX ORDER — METOPROLOL SUCCINATE 50 MG/1
50 TABLET, EXTENDED RELEASE ORAL 2 TIMES DAILY
Qty: 180 TABLET | Refills: 0 | Status: SHIPPED
Start: 2022-04-19 | End: 2022-07-11 | Stop reason: SDUPTHER

## 2022-04-19 RX ORDER — TIZANIDINE 4 MG/1
4 TABLET ORAL NIGHTLY
Qty: 20 TABLET | Refills: 0 | Status: CANCELLED | OUTPATIENT
Start: 2022-04-19

## 2022-04-19 RX ORDER — FUROSEMIDE 20 MG/1
20 TABLET ORAL DAILY
Qty: 90 TABLET | Refills: 0 | Status: SHIPPED
Start: 2022-04-19 | End: 2022-07-11 | Stop reason: SDUPTHER

## 2022-04-19 RX ORDER — ISOSORBIDE MONONITRATE 30 MG/1
30 TABLET, EXTENDED RELEASE ORAL DAILY
Qty: 90 TABLET | Refills: 0 | Status: SHIPPED
Start: 2022-04-19 | End: 2022-07-11 | Stop reason: SDUPTHER

## 2022-04-19 RX ORDER — LORATADINE 10 MG/1
10 TABLET ORAL DAILY
Qty: 90 TABLET | Refills: 0 | Status: SHIPPED
Start: 2022-04-19 | End: 2022-07-11 | Stop reason: SDUPTHER

## 2022-04-19 RX ORDER — PANTOPRAZOLE SODIUM 40 MG/1
40 TABLET, DELAYED RELEASE ORAL DAILY
Qty: 90 TABLET | Refills: 0 | Status: SHIPPED
Start: 2022-04-19 | End: 2022-07-11 | Stop reason: SDUPTHER

## 2022-04-19 RX ORDER — MONTELUKAST SODIUM 10 MG/1
10 TABLET ORAL DAILY
Qty: 90 TABLET | Refills: 0 | Status: SHIPPED
Start: 2022-04-19 | End: 2022-07-11 | Stop reason: SDUPTHER

## 2022-04-19 NOTE — PROGRESS NOTES
Chief Complaint   Patient presents with    Follow-up     from Feb 2022. no labs or tests since last visit    Other     had covid vaccines and 1 booster, is making appointment for 2nd booster,  had flu vaccine    Other     does not want to schedule the AWV as she see's doctors every 2-3 months already. HPI:  Patient is here for follow-up  No cardiopulmonary symptoms compliant on medications last blood work done January 2022  Bowel movement regular . right-sided back pain subsided  Patient requested refills on her medications  Patient complains of bilateral knee pain right worse than left she has received injections in both knees by orthopedic surgeon few years ago she was  trying to put it off because of COVID. Pandemic. Patient stated that she has difficulty walking going up and down steps she needs to use a cane. Past Medical History, Surgical History, and Family History has been reviewed and updated.     Review of Systems:  Constitutional:  No fever, no fatigue, no chills, no headaches, no weight change  Dermatology:  No rash, no mole, no dry or sensitive skin  ENT:  No cough, no sore throat, no sinus pain, no runny nose, no ear pain  Cardiology:  No chest pain, no palpitations, no leg edema, no shortness of breath, no PND  Gastroenterology:  No dysphagia, no abdominal pain, no nausea, no vomiting, no constipation, no diarrhea, no heartburn  Musculoskeletal:  No joint pain, no leg cramps, no back pain, no muscle aches  Respiratory:  No shortness of breath, no orthopnea, no wheezing, no PINTO, no hemoptysis  Urology:  No blood in the urine, no urinary frequency, no urinary incontinence, no urinary urgency, no nocturia, no dysuria    Vitals:    04/19/22 1023   BP: 120/70   Site: Right Upper Arm   Position: Sitting   Cuff Size: Large Adult   Pulse: 63   Temp: 96.3 °F (35.7 °C)   SpO2: 98%   Weight: 217 lb 12.8 oz (98.8 kg)   Height: 5' 3\" (1.6 m)       General:  Patient alert and oriented x 3, NAD, pleasant  HEENT:  Atraumatic, normocephalic, PERRLA, EOMI, clear conjunctiva, TMs clear, nose-clear, throat - no erythema  Neck:  Supple, no goiter, no carotid bruits, no LAD  Lungs:  CTA   Heart:  RRR, no murmurs, gallops or rubs  Abdomen:  Soft/nt/nd, + bowel sounds  Extremities:  No clubbing, cyanosis or edema+ bilateral knee crepitus right knee flexion limited 90 to 100 degrees no effusion  Skin: unremarkable    Hemoglobin A1C   Date Value Ref Range Status   05/27/2020 6.0 % Final     Cholesterol, Total   Date Value Ref Range Status   01/25/2022 136 0 - 199 mg/dL Final     Triglycerides   Date Value Ref Range Status   01/25/2022 149 0 - 149 mg/dL Final     HDL   Date Value Ref Range Status   01/25/2022 43 >40 mg/dL Final     LDL Calculated   Date Value Ref Range Status   01/25/2022 63 0 - 99 mg/dL Final     VLDL Cholesterol Calculated   Date Value Ref Range Status   01/25/2022 30 mg/dL Final     Sodium   Date Value Ref Range Status   01/25/2022 138 132 - 146 mmol/L Final     Potassium   Date Value Ref Range Status   01/25/2022 4.7 3.5 - 5.0 mmol/L Final     Chloride   Date Value Ref Range Status   01/25/2022 100 98 - 107 mmol/L Final     CO2   Date Value Ref Range Status   01/25/2022 27 22 - 29 mmol/L Final     BUN   Date Value Ref Range Status   01/25/2022 20 6 - 23 mg/dL Final     CREATININE   Date Value Ref Range Status   01/25/2022 0.8 0.5 - 1.0 mg/dL Final     Glucose   Date Value Ref Range Status   01/25/2022 106 (H) 74 - 99 mg/dL Final     Calcium   Date Value Ref Range Status   01/25/2022 10.8 (H) 8.6 - 10.2 mg/dL Final     Total Protein   Date Value Ref Range Status   01/25/2022 7.7 6.4 - 8.3 g/dL Final     Albumin   Date Value Ref Range Status   01/25/2022 4.5 3.5 - 5.2 g/dL Final     Total Bilirubin   Date Value Ref Range Status   01/25/2022 0.3 0.0 - 1.2 mg/dL Final     Alkaline Phosphatase   Date Value Ref Range Status   01/25/2022 64 35 - 104 U/L Final     AST   Date Value Ref Range Status 01/25/2022 18 0 - 31 U/L Final     ALT   Date Value Ref Range Status   01/25/2022 18 0 - 32 U/L Final     GFR Non-   Date Value Ref Range Status   01/25/2022 >60 >=60 mL/min/1.73 Final     Comment:     Chronic Kidney Disease: less than 60 ml/min/1.73 sq.m. Kidney Failure: less than 15 ml/min/1.73 sq.m. Results valid for patients 18 years and older. GFR    Date Value Ref Range Status   01/25/2022 >60  Final        No results found. Assessment/Plan:    Essential hypertension controlled  CHF diastolic compensated  Bilateral knee osteoarthritis right worse than left.   Start topical Voltaren gel twice daily  Coronary artery disease      Outpatient Encounter Medications as of 4/19/2022   Medication Sig Dispense Refill    Cholecalciferol (VITAMIN D3) 125 MCG (5000 UT) TABS Take 1 tablet by mouth daily 90 tablet 0    furosemide (LASIX) 20 MG tablet Take 1 tablet by mouth daily 90 tablet 0    isosorbide mononitrate (IMDUR) 30 MG extended release tablet Take 1 tablet by mouth daily 90 tablet 0    metoprolol succinate (TOPROL XL) 50 MG extended release tablet Take 1 tablet by mouth 2 times daily 180 tablet 0    montelukast (SINGULAIR) 10 MG tablet Take 1 tablet by mouth daily 90 tablet 0    pantoprazole (PROTONIX) 40 MG tablet Take 1 tablet by mouth daily 90 tablet 0    loratadine (CLARITIN) 10 MG tablet Take 1 tablet by mouth daily 90 tablet 0    diclofenac sodium (VOLTAREN) 1 % GEL Apply 4 g topically 2 times daily 100 g 1    Evolocumab 140 MG/ML SOAJ 1 SC EVERY 2 WEEKS FOR CHOLESTEROL 2 mL 3    tiZANidine (ZANAFLEX) 4 MG tablet Take 1 tablet by mouth at bedtime 20 tablet 0    aspirin 81 MG EC tablet Take 325 mg by mouth daily       acetaminophen (TYLENOL 8 HOUR ARTHRITIS PAIN) 650 MG extended release tablet Take 650 mg by mouth 2 times daily      mometasone (NASONEX) 50 MCG/ACT nasal spray 1 spray by Each Nostril route daily      Albuterol Sulfate (PROAIR HFA IN) Inhale 2 puffs into the lungs 4 times daily      azelastine (ASTELIN) 0.1 % nasal spray 1 spray by Nasal route 2 times daily Use in each nostril as directed      ibuprofen (ADVIL;MOTRIN) 200 MG tablet Take 200 mg by mouth every 6 hours as needed for Pain      nitroGLYCERIN (NITROSTAT) 0.4 MG SL tablet Place 0.4 mg under the tongue every 5 minutes as needed for Chest pain up to max of 3 total doses. If no relief after 1 dose, call 911.  [DISCONTINUED] Cholecalciferol (VITAMIN D3) 125 MCG (5000 UT) TABS Take 1 tablet by mouth daily 90 tablet 0    [DISCONTINUED] furosemide (LASIX) 20 MG tablet Take 1 tablet by mouth daily 90 tablet 0    [DISCONTINUED] isosorbide mononitrate (IMDUR) 30 MG extended release tablet Take 1 tablet by mouth daily 90 tablet 0    [DISCONTINUED] loratadine (CLARITIN) 10 MG tablet Take 1 tablet by mouth daily 90 tablet 0    [DISCONTINUED] metoprolol succinate (TOPROL XL) 50 MG extended release tablet Take 1 tablet by mouth 2 times daily 180 tablet 0    [DISCONTINUED] montelukast (SINGULAIR) 10 MG tablet Take 1 tablet by mouth daily 90 tablet 0    [DISCONTINUED] pantoprazole (PROTONIX) 40 MG tablet Take 1 tablet by mouth daily 90 tablet 0    [DISCONTINUED] guaiFENesin 400 MG tablet Take 400 mg by mouth 4 times daily as needed for Cough (Patient not taking: Reported on 4/19/2022)       No facility-administered encounter medications on file as of 4/19/2022. Landy Strickland was seen today for follow-up, other and other. 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 (HCC)  -     isosorbide mononitrate (IMDUR) 30 MG extended release tablet;  Take 1 tablet by mouth daily    Essential hypertension  -     metoprolol succinate (TOPROL XL) 50 MG extended release tablet; Take 1 tablet by mouth 2 times daily    Hyperlipidemia LDL goal <100    Chronic rhinitis  -     montelukast (SINGULAIR) 10 MG tablet; Take 1 tablet by mouth daily  -     loratadine (CLARITIN) 10 MG tablet; Take 1 tablet by mouth daily    Bilateral primary osteoarthritis of knee  -     XR KNEE RIGHT (3 VIEWS); Future  -     XR KNEE LEFT (3 VIEWS); Future  -     diclofenac sodium (VOLTAREN) 1 % GEL; Apply 4 g topically 2 times daily    Primary hypertension    Gastroesophageal reflux disease without esophagitis    Other orders  -     Cholecalciferol (VITAMIN D3) 125 MCG (5000 UT) TABS; Take 1 tablet by mouth daily  -     pantoprazole (PROTONIX) 40 MG tablet; Take 1 tablet by mouth daily         There are no Patient Instructions on file for this visit.            Minerva Black MD   4/19/22

## 2022-04-21 ENCOUNTER — HOSPITAL ENCOUNTER (OUTPATIENT)
Age: 82
Discharge: HOME | End: 2022-04-23
Payer: MEDICARE

## 2022-04-21 ENCOUNTER — HOSPITAL ENCOUNTER (OUTPATIENT)
Dept: GENERAL RADIOLOGY | Age: 82
Discharge: HOME | End: 2022-04-23
Payer: MEDICARE

## 2022-04-21 DIAGNOSIS — M17.0 BILATERAL PRIMARY OSTEOARTHRITIS OF KNEE: ICD-10-CM

## 2022-04-21 PROCEDURE — 73562 X-RAY EXAM OF KNEE 3: CPT

## 2022-05-02 ENCOUNTER — TELEPHONE (OUTPATIENT)
Dept: PRIMARY CARE CLINIC | Age: 82
End: 2022-05-02

## 2022-05-09 ENCOUNTER — OFFICE VISIT (OUTPATIENT)
Dept: PRIMARY CARE CLINIC | Age: 82
End: 2022-05-09
Payer: MEDICARE

## 2022-05-09 VITALS
DIASTOLIC BLOOD PRESSURE: 70 MMHG | HEART RATE: 62 BPM | SYSTOLIC BLOOD PRESSURE: 122 MMHG | WEIGHT: 214.7 LBS | OXYGEN SATURATION: 95 % | TEMPERATURE: 97.3 F | HEIGHT: 63 IN | BODY MASS INDEX: 38.04 KG/M2

## 2022-05-09 DIAGNOSIS — S39.011A STRAIN OF ABDOMINAL MUSCLE, INITIAL ENCOUNTER: Primary | ICD-10-CM

## 2022-05-09 PROCEDURE — 99212 OFFICE O/P EST SF 10 MIN: CPT | Performed by: STUDENT IN AN ORGANIZED HEALTH CARE EDUCATION/TRAINING PROGRAM

## 2022-05-09 ASSESSMENT — ENCOUNTER SYMPTOMS
RECTAL PAIN: 0
NAUSEA: 0
ABDOMINAL PAIN: 1
BLOOD IN STOOL: 0
RESPIRATORY NEGATIVE: 1
CONSTIPATION: 0
DIARRHEA: 0
VOMITING: 0
ABDOMINAL DISTENTION: 0

## 2022-05-09 NOTE — PROGRESS NOTES
Angelic Elizabeth (:  1940) is a [de-identified] y.o. female,Established patient, here for evaluation of the following chief complaint(s):  Abdominal Pain (Pt states she is here for tenderness, left midline, umbilical pain that started 4 days ago, and is tender upon palpitation. This patient takes her injections in her abdomen and is taking Tylenol for relief. )         ASSESSMENT/PLAN:  1. Strain of abdominal muscle, initial encounter    Discussed using heat and continuing Tylenol; if not improvement, will consider imaging     No bruising or swelling to suggest injection site issue; no evidence of hernia and no other signs that would suggest gastroenteritis    No follow-ups on file. Subjective   SUBJECTIVE/OBJECTIVE:  HPI     Patient is a [de-identified] y/o F who presents for acute visit for 4 days of abdominal pain. Pain is located just to the left of her belly button. She gives herself injections of repatha near this area and wonders if this is related to her pain. Denies any bruising of the skin, no swelling in the area, no N/V/D, no fever. No changes to appetite. No history of diverticulosis. Pain made worse with coughing. Tylenol has helped her symtpoms    Review of Systems   Constitutional: Negative. HENT: Negative. Respiratory: Negative. Cardiovascular: Negative. Gastrointestinal: Positive for abdominal pain. Negative for abdominal distention, blood in stool, constipation, diarrhea, nausea, rectal pain and vomiting. Genitourinary: Negative. Skin: Negative. Objective   Physical Exam  Vitals reviewed. Constitutional:       Appearance: Normal appearance. HENT:      Head: Normocephalic and atraumatic. Eyes:      General:         Right eye: No discharge. Left eye: No discharge. Cardiovascular:      Rate and Rhythm: Normal rate and regular rhythm. Pulses: Normal pulses. Heart sounds: Normal heart sounds.    Pulmonary:      Effort: Pulmonary effort is normal. Breath sounds: Normal breath sounds. Abdominal:      General: Bowel sounds are normal. There is no distension. Palpations: Abdomen is soft. There is no mass. Tenderness: There is abdominal tenderness (TTP area just L of umbilicus). There is no right CVA tenderness, left CVA tenderness, guarding or rebound. Hernia: No hernia is present. Neurological:      Mental Status: She is alert. An electronic signature was used to authenticate this note.     --Jorge Colón MD

## 2022-06-14 ENCOUNTER — OFFICE VISIT (OUTPATIENT)
Dept: PRIMARY CARE CLINIC | Age: 82
End: 2022-06-14
Payer: MEDICARE

## 2022-06-14 VITALS
OXYGEN SATURATION: 98 % | DIASTOLIC BLOOD PRESSURE: 70 MMHG | HEIGHT: 63 IN | HEART RATE: 58 BPM | TEMPERATURE: 96.8 F | SYSTOLIC BLOOD PRESSURE: 124 MMHG | WEIGHT: 215.6 LBS | BODY MASS INDEX: 38.2 KG/M2

## 2022-06-14 DIAGNOSIS — M17.0 PRIMARY OSTEOARTHRITIS OF BOTH KNEES: Primary | ICD-10-CM

## 2022-06-14 DIAGNOSIS — I25.728 CORONARY ARTERY DISEASE OF AUTOLOGOUS BYPASS GRAFT WITH STABLE ANGINA PECTORIS (HCC): ICD-10-CM

## 2022-06-14 DIAGNOSIS — I10 PRIMARY HYPERTENSION: ICD-10-CM

## 2022-06-14 DIAGNOSIS — E78.5 HYPERLIPIDEMIA LDL GOAL <100: ICD-10-CM

## 2022-06-14 DIAGNOSIS — K58.1 IRRITABLE BOWEL SYNDROME WITH CONSTIPATION: ICD-10-CM

## 2022-06-14 PROCEDURE — 99213 OFFICE O/P EST LOW 20 MIN: CPT | Performed by: INTERNAL MEDICINE

## 2022-06-14 PROCEDURE — 1123F ACP DISCUSS/DSCN MKR DOCD: CPT | Performed by: INTERNAL MEDICINE

## 2022-06-14 ASSESSMENT — PATIENT HEALTH QUESTIONNAIRE - PHQ9
2. FEELING DOWN, DEPRESSED OR HOPELESS: 1
1. LITTLE INTEREST OR PLEASURE IN DOING THINGS: 0
SUM OF ALL RESPONSES TO PHQ9 QUESTIONS 1 & 2: 1
SUM OF ALL RESPONSES TO PHQ QUESTIONS 1-9: 1

## 2022-06-14 NOTE — PROGRESS NOTES
Chief Complaint   Patient presents with    Follow-up     from April with xray images of knees.  Other     here to discuss results of knee xrays.  Other     care gap-  wants to wait on AWV till after knees are discussed and cared for,  due for DTaP          HPI:  Patient is here for follow-up   Patient feels well overall no abdominal pain. Complain of both knees hurt right is worse than the left. X-ray of both knees consistent with osteoarthritis right is worse than left. No cardiopulmonary symptoms. Past Medical History, Surgical History, and Family History has been reviewed and updated.     Review of Systems:  Constitutional:  No fever, no fatigue, no chills, no headaches, no weight change  Dermatology:  No rash, no mole, no dry or sensitive skin  ENT:  No cough, no sore throat, no sinus pain, no runny nose, no ear pain  Cardiology:  No chest pain, no palpitations, no leg edema, no shortness of breath, no PND  Gastroenterology:  No dysphagia, no abdominal pain, no nausea, no vomiting, no constipation, no diarrhea, no heartburn  Musculoskeletal:  No joint pain, no leg cramps, no back pain, no muscle aches  Respiratory:  No shortness of breath, no orthopnea, no wheezing, no PINTO, no hemoptysis  Urology:  No blood in the urine, no urinary frequency, no urinary incontinence, no urinary urgency, no nocturia, no dysuria    Vitals:    06/14/22 1043   BP: 124/70   Site: Right Upper Arm   Position: Sitting   Cuff Size: Large Adult   Pulse: 58   Temp: 96.8 °F (36 °C)   SpO2: 98%   Weight: 215 lb 9.6 oz (97.8 kg)   Height: 5' 3\" (1.6 m)       General:  Patient alert and oriented x 3, NAD, pleasant  HEENT:  Atraumatic, normocephalic, PERRLA, EOMI, clear conjunctiva, TMs clear, nose-clear, throat - no erythema  Neck:  Supple, no goiter, no carotid bruits, no LAD  Lungs:  CTA   Heart:  RRR, no murmurs, gallops or rubs  Abdomen:  Soft/nt/nd, + bowel sounds  Extremities:  No clubbing, cyanosis or edema  + bilateral knee crepitus right knee flexion limited 90 to 100 degrees no effusion  Skin: unremarkable    Hemoglobin A1C   Date Value Ref Range Status   05/27/2020 6.0 % Final     Cholesterol, Total   Date Value Ref Range Status   01/25/2022 136 0 - 199 mg/dL Final     Triglycerides   Date Value Ref Range Status   01/25/2022 149 0 - 149 mg/dL Final     HDL   Date Value Ref Range Status   01/25/2022 43 >40 mg/dL Final     LDL Calculated   Date Value Ref Range Status   01/25/2022 63 0 - 99 mg/dL Final     VLDL Cholesterol Calculated   Date Value Ref Range Status   01/25/2022 30 mg/dL Final     Sodium   Date Value Ref Range Status   01/25/2022 138 132 - 146 mmol/L Final     Potassium   Date Value Ref Range Status   01/25/2022 4.7 3.5 - 5.0 mmol/L Final     Chloride   Date Value Ref Range Status   01/25/2022 100 98 - 107 mmol/L Final     CO2   Date Value Ref Range Status   01/25/2022 27 22 - 29 mmol/L Final     BUN   Date Value Ref Range Status   01/25/2022 20 6 - 23 mg/dL Final     CREATININE   Date Value Ref Range Status   01/25/2022 0.8 0.5 - 1.0 mg/dL Final     Glucose   Date Value Ref Range Status   01/25/2022 106 (H) 74 - 99 mg/dL Final     Calcium   Date Value Ref Range Status   01/25/2022 10.8 (H) 8.6 - 10.2 mg/dL Final     Total Protein   Date Value Ref Range Status   01/25/2022 7.7 6.4 - 8.3 g/dL Final     Albumin   Date Value Ref Range Status   01/25/2022 4.5 3.5 - 5.2 g/dL Final     Total Bilirubin   Date Value Ref Range Status   01/25/2022 0.3 0.0 - 1.2 mg/dL Final     Alkaline Phosphatase   Date Value Ref Range Status   01/25/2022 64 35 - 104 U/L Final     AST   Date Value Ref Range Status   01/25/2022 18 0 - 31 U/L Final     ALT   Date Value Ref Range Status   01/25/2022 18 0 - 32 U/L Final     GFR Non-   Date Value Ref Range Status   01/25/2022 >60 >=60 mL/min/1.73 Final     Comment:     Chronic Kidney Disease: less than 60 ml/min/1.73 sq.m.           Kidney Failure: less than 15 ml/min/1.73 sq.m.  Results valid for patients 18 years and older. GFR    Date Value Ref Range Status   01/25/2022 >60  Final        No results found.      Assessment/Plan:    Bilateral knee osteoarthritis right worse than left  Refer to Dr. Jonas Dakins orthopedic surgery  Coronary artery disease  Hypertension  Hyperlipidemia  Irritable bowel syndrome constipation    Outpatient Encounter Medications as of 6/14/2022   Medication Sig Dispense Refill    Cholecalciferol (VITAMIN D3) 125 MCG (5000 UT) TABS Take 1 tablet by mouth daily 90 tablet 0    furosemide (LASIX) 20 MG tablet Take 1 tablet by mouth daily 90 tablet 0    isosorbide mononitrate (IMDUR) 30 MG extended release tablet Take 1 tablet by mouth daily 90 tablet 0    metoprolol succinate (TOPROL XL) 50 MG extended release tablet Take 1 tablet by mouth 2 times daily 180 tablet 0    montelukast (SINGULAIR) 10 MG tablet Take 1 tablet by mouth daily 90 tablet 0    pantoprazole (PROTONIX) 40 MG tablet Take 1 tablet by mouth daily 90 tablet 0    loratadine (CLARITIN) 10 MG tablet Take 1 tablet by mouth daily 90 tablet 0    diclofenac sodium (VOLTAREN) 1 % GEL Apply 4 g topically 2 times daily 100 g 1    Evolocumab 140 MG/ML SOAJ 1 SC EVERY 2 WEEKS FOR CHOLESTEROL 2 mL 3    aspirin 81 MG EC tablet Take 325 mg by mouth daily       acetaminophen (TYLENOL 8 HOUR ARTHRITIS PAIN) 650 MG extended release tablet Take 650 mg by mouth 2 times daily      mometasone (NASONEX) 50 MCG/ACT nasal spray 1 spray by Each Nostril route daily      Albuterol Sulfate (PROAIR HFA IN) Inhale 2 puffs into the lungs 4 times daily      azelastine (ASTELIN) 0.1 % nasal spray 1 spray by Nasal route 2 times daily Use in each nostril as directed      ibuprofen (ADVIL;MOTRIN) 200 MG tablet Take 200 mg by mouth every 6 hours as needed for Pain      nitroGLYCERIN (NITROSTAT) 0.4 MG SL tablet Place 0.4 mg under the tongue every 5 minutes as needed for Chest pain up to max of 3 total doses. If no relief after 1 dose, call 911.  tiZANidine (ZANAFLEX) 4 MG tablet Take 1 tablet by mouth at bedtime (Patient not taking: Reported on 6/14/2022) 20 tablet 0     No facility-administered encounter medications on file as of 6/14/2022. Jose Blackman was seen today for follow-up, other and other. 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            Mauricio Cdi MD   6/14/22

## 2022-06-22 ENCOUNTER — OFFICE VISIT (OUTPATIENT)
Dept: PRIMARY CARE CLINIC | Age: 82
End: 2022-06-22
Payer: MEDICARE

## 2022-06-22 VITALS
OXYGEN SATURATION: 98 % | HEIGHT: 63 IN | WEIGHT: 216 LBS | HEART RATE: 78 BPM | BODY MASS INDEX: 38.27 KG/M2 | TEMPERATURE: 97.8 F | DIASTOLIC BLOOD PRESSURE: 80 MMHG | SYSTOLIC BLOOD PRESSURE: 128 MMHG

## 2022-06-22 DIAGNOSIS — I50.31 DIASTOLIC CHF, ACUTE (HCC): ICD-10-CM

## 2022-06-22 DIAGNOSIS — L03.211 FACIAL CELLULITIS: Primary | ICD-10-CM

## 2022-06-22 DIAGNOSIS — I10 PRIMARY HYPERTENSION: ICD-10-CM

## 2022-06-22 PROCEDURE — 1123F ACP DISCUSS/DSCN MKR DOCD: CPT | Performed by: INTERNAL MEDICINE

## 2022-06-22 PROCEDURE — 99213 OFFICE O/P EST LOW 20 MIN: CPT | Performed by: INTERNAL MEDICINE

## 2022-06-22 RX ORDER — CLINDAMYCIN PHOSPHATE 10 MG/G
GEL TOPICAL
Qty: 30 G | Refills: 0 | Status: SHIPPED | OUTPATIENT
Start: 2022-06-22 | End: 2022-06-29

## 2022-06-22 RX ORDER — DOXYCYCLINE HYCLATE 100 MG/1
100 CAPSULE ORAL 2 TIMES DAILY
Qty: 20 CAPSULE | Refills: 0 | Status: SHIPPED | OUTPATIENT
Start: 2022-06-22 | End: 2022-07-02

## 2022-06-22 NOTE — PROGRESS NOTES
Chief Complaint   Patient presents with    Facial Pain     left side, started about a week ago, painful when laying     Swelling     left side    Ear Fullness     right ear       HPI:  Patient is here for an acute visit  Patient complains of left facial pain worse upon laying down on the left side has been getting worse over the past week tender to touch, skin redness  Denied fever chills    Past Medical History, Surgical History, and Family History has been reviewed and updated. Review of Systems:  Constitutional:  No fever, no fatigue, no chills, no headaches, no weight change  Dermatology:  No rash, no mole, no dry or sensitive skin  ENT:  No cough, no sore throat, no sinus pain, no runny nose, no ear pain  Cardiology:  No chest pain, no palpitations, no leg edema, no shortness of breath, no PND  Gastroenterology:  No dysphagia, no abdominal pain, no nausea, no vomiting, no constipation, no diarrhea, no heartburn  Musculoskeletal:  No joint pain, no leg cramps, no back pain, no muscle aches  Respiratory:  No shortness of breath, no orthopnea, no wheezing, no PINTO, no hemoptysis  Urology:  No blood in the urine, no urinary frequency, no urinary incontinence, no urinary urgency, no nocturia, no dysuria    Vitals:    06/22/22 1023   BP: 128/80   Pulse: 78   Temp: 97.8 °F (36.6 °C)   TempSrc: Temporal   SpO2: 98%   Weight: 216 lb (98 kg)   Height: 5' 3\" (1.6 m)       General:  Patient alert and oriented x 3, NAD, pleasant  HEENT:  Atraumatic, normocephalic, left cheek diffuse erythema warm to touch tender.  PERRLA, EOMI, clear conjunctiva, TMs clear, nose-clear, throat - no erythema  Neck:  Supple, no goiter, no carotid bruits, no LAD  Lungs:  CTA   Heart:  RRR, no murmurs, gallops or rubs  Abdomen:  Soft/nt/nd, + bowel sounds  Extremities:  No clubbing, cyanosis or edema  Skin: unremarkable    Hemoglobin A1C   Date Value Ref Range Status   05/27/2020 6.0 % Final     Cholesterol, Total   Date Value Ref Range Status   01/25/2022 136 0 - 199 mg/dL Final     Triglycerides   Date Value Ref Range Status   01/25/2022 149 0 - 149 mg/dL Final     HDL   Date Value Ref Range Status   01/25/2022 43 >40 mg/dL Final     LDL Calculated   Date Value Ref Range Status   01/25/2022 63 0 - 99 mg/dL Final     VLDL Cholesterol Calculated   Date Value Ref Range Status   01/25/2022 30 mg/dL Final     Sodium   Date Value Ref Range Status   01/25/2022 138 132 - 146 mmol/L Final     Potassium   Date Value Ref Range Status   01/25/2022 4.7 3.5 - 5.0 mmol/L Final     Chloride   Date Value Ref Range Status   01/25/2022 100 98 - 107 mmol/L Final     CO2   Date Value Ref Range Status   01/25/2022 27 22 - 29 mmol/L Final     BUN   Date Value Ref Range Status   01/25/2022 20 6 - 23 mg/dL Final     CREATININE   Date Value Ref Range Status   01/25/2022 0.8 0.5 - 1.0 mg/dL Final     Glucose   Date Value Ref Range Status   01/25/2022 106 (H) 74 - 99 mg/dL Final     Calcium   Date Value Ref Range Status   01/25/2022 10.8 (H) 8.6 - 10.2 mg/dL Final     Total Protein   Date Value Ref Range Status   01/25/2022 7.7 6.4 - 8.3 g/dL Final     Albumin   Date Value Ref Range Status   01/25/2022 4.5 3.5 - 5.2 g/dL Final     Total Bilirubin   Date Value Ref Range Status   01/25/2022 0.3 0.0 - 1.2 mg/dL Final     Alkaline Phosphatase   Date Value Ref Range Status   01/25/2022 64 35 - 104 U/L Final     AST   Date Value Ref Range Status   01/25/2022 18 0 - 31 U/L Final     ALT   Date Value Ref Range Status   01/25/2022 18 0 - 32 U/L Final     GFR Non-   Date Value Ref Range Status   01/25/2022 >60 >=60 mL/min/1.73 Final     Comment:     Chronic Kidney Disease: less than 60 ml/min/1.73 sq.m. Kidney Failure: less than 15 ml/min/1.73 sq.m. Results valid for patients 18 years and older. GFR    Date Value Ref Range Status   01/25/2022 >60  Final        No results found.      Assessment/Plan:   Facial cellulitis left side  Essential hypertension  Diastolic CHF compensated    Outpatient Encounter Medications as of 6/22/2022   Medication Sig Dispense Refill    clindamycin (CLEOCIN-T) 1 % gel Apply topically 2 times daily. 30 g 0    doxycycline hyclate (VIBRAMYCIN) 100 MG capsule Take 1 capsule by mouth 2 times daily for 10 days 20 capsule 0    Evolocumab 140 MG/ML SOAJ 1 SC EVERY 2 WEEKS FOR CHOLESTEROL 2 mL 5    Cholecalciferol (VITAMIN D3) 125 MCG (5000 UT) TABS Take 1 tablet by mouth daily 90 tablet 0    furosemide (LASIX) 20 MG tablet Take 1 tablet by mouth daily 90 tablet 0    isosorbide mononitrate (IMDUR) 30 MG extended release tablet Take 1 tablet by mouth daily 90 tablet 0    metoprolol succinate (TOPROL XL) 50 MG extended release tablet Take 1 tablet by mouth 2 times daily 180 tablet 0    montelukast (SINGULAIR) 10 MG tablet Take 1 tablet by mouth daily 90 tablet 0    pantoprazole (PROTONIX) 40 MG tablet Take 1 tablet by mouth daily 90 tablet 0    loratadine (CLARITIN) 10 MG tablet Take 1 tablet by mouth daily 90 tablet 0    diclofenac sodium (VOLTAREN) 1 % GEL Apply 4 g topically 2 times daily 100 g 1    aspirin 81 MG EC tablet Take 325 mg by mouth daily       acetaminophen (TYLENOL 8 HOUR ARTHRITIS PAIN) 650 MG extended release tablet Take 650 mg by mouth 2 times daily      mometasone (NASONEX) 50 MCG/ACT nasal spray 1 spray by Each Nostril route daily      Albuterol Sulfate (PROAIR HFA IN) Inhale 2 puffs into the lungs 4 times daily      azelastine (ASTELIN) 0.1 % nasal spray 1 spray by Nasal route 2 times daily Use in each nostril as directed      ibuprofen (ADVIL;MOTRIN) 200 MG tablet Take 200 mg by mouth every 6 hours as needed for Pain      nitroGLYCERIN (NITROSTAT) 0.4 MG SL tablet Place 0.4 mg under the tongue every 5 minutes as needed for Chest pain up to max of 3 total doses. If no relief after 1 dose, call 911. No facility-administered encounter medications on file as of 6/22/2022. Kenan Buitrago was seen today for facial pain, swelling and ear fullness. Diagnoses and all orders for this visit:    Facial cellulitis  -     clindamycin (CLEOCIN-T) 1 % gel; Apply topically 2 times daily. Primary hypertension    Diastolic CHF, acute ([address]    Other orders  -     doxycycline hyclate (VIBRAMYCIN) 100 MG capsule; Take 1 capsule by mouth 2 times daily for 10 days         There are no Patient Instructions on file for this visit.            Cresencio Goins MD   6/22/22

## 2022-07-08 ENCOUNTER — ANESTHESIA EVENT (OUTPATIENT)
Dept: OPERATING ROOM | Age: 82
End: 2022-07-08
Payer: MEDICARE

## 2022-07-11 ENCOUNTER — OFFICE VISIT (OUTPATIENT)
Dept: PRIMARY CARE CLINIC | Age: 82
End: 2022-07-11
Payer: MEDICARE

## 2022-07-11 VITALS
SYSTOLIC BLOOD PRESSURE: 132 MMHG | BODY MASS INDEX: 38.71 KG/M2 | WEIGHT: 218.5 LBS | DIASTOLIC BLOOD PRESSURE: 70 MMHG | OXYGEN SATURATION: 98 % | HEART RATE: 59 BPM | TEMPERATURE: 96.8 F | HEIGHT: 63 IN

## 2022-07-11 DIAGNOSIS — I10 ESSENTIAL HYPERTENSION: ICD-10-CM

## 2022-07-11 DIAGNOSIS — I50.31 DIASTOLIC CHF, ACUTE (HCC): ICD-10-CM

## 2022-07-11 DIAGNOSIS — J31.0 CHRONIC RHINITIS: ICD-10-CM

## 2022-07-11 DIAGNOSIS — I25.728 CORONARY ARTERY DISEASE OF AUTOLOGOUS BYPASS GRAFT WITH STABLE ANGINA PECTORIS (HCC): ICD-10-CM

## 2022-07-11 DIAGNOSIS — R22.1 LOCALIZED SWELLING, MASS AND LUMP, NECK: Primary | ICD-10-CM

## 2022-07-11 PROCEDURE — 99213 OFFICE O/P EST LOW 20 MIN: CPT | Performed by: INTERNAL MEDICINE

## 2022-07-11 PROCEDURE — 1123F ACP DISCUSS/DSCN MKR DOCD: CPT | Performed by: INTERNAL MEDICINE

## 2022-07-11 RX ORDER — FUROSEMIDE 20 MG/1
20 TABLET ORAL DAILY
Qty: 90 TABLET | Refills: 0 | Status: SHIPPED | OUTPATIENT
Start: 2022-07-11

## 2022-07-11 RX ORDER — PANTOPRAZOLE SODIUM 40 MG/1
40 TABLET, DELAYED RELEASE ORAL DAILY
Qty: 90 TABLET | Refills: 0 | Status: SHIPPED | OUTPATIENT
Start: 2022-07-11

## 2022-07-11 RX ORDER — LORATADINE 10 MG/1
10 TABLET ORAL DAILY
Qty: 90 TABLET | Refills: 0 | Status: SHIPPED | OUTPATIENT
Start: 2022-07-11

## 2022-07-11 RX ORDER — MONTELUKAST SODIUM 10 MG/1
10 TABLET ORAL DAILY
Qty: 90 TABLET | Refills: 0 | Status: SHIPPED | OUTPATIENT
Start: 2022-07-11

## 2022-07-11 RX ORDER — AZITHROMYCIN 250 MG/1
250 TABLET, FILM COATED ORAL SEE ADMIN INSTRUCTIONS
Qty: 6 TABLET | Refills: 0 | Status: SHIPPED | OUTPATIENT
Start: 2022-07-11 | End: 2022-07-16

## 2022-07-11 RX ORDER — ISOSORBIDE MONONITRATE 30 MG/1
30 TABLET, EXTENDED RELEASE ORAL DAILY
Qty: 90 TABLET | Refills: 0 | Status: SHIPPED | OUTPATIENT
Start: 2022-07-11

## 2022-07-11 RX ORDER — METOPROLOL SUCCINATE 50 MG/1
50 TABLET, EXTENDED RELEASE ORAL 2 TIMES DAILY
Qty: 180 TABLET | Refills: 0 | Status: SHIPPED | OUTPATIENT
Start: 2022-07-11

## 2022-07-11 NOTE — PROGRESS NOTES
Chief Complaint   Patient presents with    Follow-up     from June 2022. no labs or tests. states her face remains swollen and reddened areas on cheek lines noted, along with darkish color around mouth    Other     is concerned about upcoming catarract surgery with condition of face.  Other     concerned facial swelling increases through the day and is very noticible at night. HPI:  Patient is here for follow-up   On right facial redness swelling and pain when she lays down, patient finished 10 days of doxycycline, face feels much better she has localized pain in the left upper neck with a lump  It hurts when she swallows. No dental sinus or ear symptoms    Past Medical History, Surgical History, and Family History has been reviewed and updated.     Review of Systems:  Constitutional:  No fever, no fatigue, no chills, no headaches, no weight change  Dermatology:  No rash, no mole, no dry or sensitive skin  ENT:  No cough, no sore throat, no sinus pain, no runny nose, no ear pain  Cardiology:  No chest pain, no palpitations, no leg edema, no shortness of breath, no PND  Gastroenterology:  No dysphagia, no abdominal pain, no nausea, no vomiting, no constipation, no diarrhea, no heartburn  Musculoskeletal:  No joint pain, no leg cramps, no back pain, no muscle aches  Respiratory:  No shortness of breath, no orthopnea, no wheezing, no PINTO, no hemoptysis  Urology:  No blood in the urine, no urinary frequency, no urinary incontinence, no urinary urgency, no nocturia, no dysuria    Vitals:    07/11/22 0932   BP: 132/70   Site: Right Upper Arm   Position: Sitting   Cuff Size: Medium Adult   Pulse: 59   Temp: 96.8 °F (36 °C)   SpO2: 98%   Weight: 218 lb 8 oz (99.1 kg)   Height: 5' 3\" (1.6 m)       General:  Patient alert and oriented x 3, NAD, pleasant  HEENT:  Atraumatic, normocephalic, PERRLA, EOMI, clear conjunctiva, TMs clear, nose-clear, throat - no erythema  Neck:  Supple, no goiter, no carotid bruits, no LAD.   Left upper deep cervical lymph node enlarged and tender 1 inch in diameter  Lungs:  CTA   Heart:  RRR, no murmurs, gallops or rubs  Abdomen:  Soft/nt/nd, + bowel sounds  Extremities:  No clubbing, cyanosis or edema  Skin: unremarkable    Hemoglobin A1C   Date Value Ref Range Status   05/27/2020 6.0 % Final     Cholesterol, Total   Date Value Ref Range Status   01/25/2022 136 0 - 199 mg/dL Final     Triglycerides   Date Value Ref Range Status   01/25/2022 149 0 - 149 mg/dL Final     HDL   Date Value Ref Range Status   01/25/2022 43 >40 mg/dL Final     LDL Calculated   Date Value Ref Range Status   01/25/2022 63 0 - 99 mg/dL Final     VLDL Cholesterol Calculated   Date Value Ref Range Status   01/25/2022 30 mg/dL Final     Sodium   Date Value Ref Range Status   01/25/2022 138 132 - 146 mmol/L Final     Potassium   Date Value Ref Range Status   01/25/2022 4.7 3.5 - 5.0 mmol/L Final     Chloride   Date Value Ref Range Status   01/25/2022 100 98 - 107 mmol/L Final     CO2   Date Value Ref Range Status   01/25/2022 27 22 - 29 mmol/L Final     BUN   Date Value Ref Range Status   01/25/2022 20 6 - 23 mg/dL Final     CREATININE   Date Value Ref Range Status   01/25/2022 0.8 0.5 - 1.0 mg/dL Final     Glucose   Date Value Ref Range Status   01/25/2022 106 (H) 74 - 99 mg/dL Final     Calcium   Date Value Ref Range Status   01/25/2022 10.8 (H) 8.6 - 10.2 mg/dL Final     Total Protein   Date Value Ref Range Status   01/25/2022 7.7 6.4 - 8.3 g/dL Final     Albumin   Date Value Ref Range Status   01/25/2022 4.5 3.5 - 5.2 g/dL Final     Total Bilirubin   Date Value Ref Range Status   01/25/2022 0.3 0.0 - 1.2 mg/dL Final     Alkaline Phosphatase   Date Value Ref Range Status   01/25/2022 64 35 - 104 U/L Final     AST   Date Value Ref Range Status   01/25/2022 18 0 - 31 U/L Final     ALT   Date Value Ref Range Status   01/25/2022 18 0 - 32 U/L Final     GFR Non-   Date Value Ref Range Status   01/25/2022 >60 >=60 mL/min/1.73 Final     Comment:     Chronic Kidney Disease: less than 60 ml/min/1.73 sq.m. Kidney Failure: less than 15 ml/min/1.73 sq.m. Results valid for patients 18 years and older. GFR    Date Value Ref Range Status   01/25/2022 >60  Final        No results found. Assessment/Plan:    Left upper neck lump possible lymph node  CT of soft tissue of the neck  Coronary artery disease  Hypertension    Outpatient Encounter Medications as of 7/11/2022   Medication Sig Dispense Refill    pantoprazole (PROTONIX) 40 MG tablet Take 1 tablet by mouth daily 90 tablet 0    montelukast (SINGULAIR) 10 MG tablet Take 1 tablet by mouth daily 90 tablet 0    metoprolol succinate (TOPROL XL) 50 MG extended release tablet Take 1 tablet by mouth 2 times daily 180 tablet 0    isosorbide mononitrate (IMDUR) 30 MG extended release tablet Take 1 tablet by mouth daily am 90 tablet 0    loratadine (CLARITIN) 10 MG tablet Take 1 tablet by mouth daily 90 tablet 0    furosemide (LASIX) 20 MG tablet Take 1 tablet by mouth daily 90 tablet 0    azithromycin (ZITHROMAX) 250 MG tablet Take 1 tablet by mouth See Admin Instructions for 5 days 500mg on day 1 followed by 250mg on days 2 - 5 6 tablet 0    Evolocumab 140 MG/ML SOAJ 1 SC EVERY 2 WEEKS FOR CHOLESTEROL (Patient taking differently: 1 SC EVERY 2 WEEKS FOR CHOLESTEROL.  Due  7/14/22) 2 mL 5    Cholecalciferol (VITAMIN D3) 125 MCG (5000 UT) TABS Take 1 tablet by mouth daily 90 tablet 0    diclofenac sodium (VOLTAREN) 1 % GEL Apply 4 g topically 2 times daily (Patient taking differently: Apply 4 g topically 2 times daily as needed ) 100 g 1    aspirin 81 MG EC tablet Take 325 mg by mouth daily Follow Dr. Zara Best anticoagulation orders      acetaminophen (TYLENOL 8 HOUR ARTHRITIS PAIN) 650 MG extended release tablet Take 650 mg by mouth 2 times daily as needed       mometasone (NASONEX) 50 MCG/ACT nasal spray 1 spray by Each Nostril route as needed       Albuterol Sulfate (PROAIR HFA IN) Inhale 2 puffs into the lungs 4 times daily      azelastine (ASTELIN) 0.1 % nasal spray 1 spray by Nasal route 2 times daily Use in each nostril as directed       ibuprofen (ADVIL;MOTRIN) 200 MG tablet Take 200 mg by mouth every 6 hours as needed for Pain Dr. Elliot de      nitroGLYCERIN (NITROSTAT) 0.4 MG SL tablet Place 0.4 mg under the tongue every 5 minutes as needed for Chest pain up to max of 3 total doses. If no relief after 1 dose, call 911.  [DISCONTINUED] furosemide (LASIX) 20 MG tablet Take 1 tablet by mouth daily 90 tablet 0    [DISCONTINUED] isosorbide mononitrate (IMDUR) 30 MG extended release tablet Take 1 tablet by mouth daily (Patient taking differently: Take 30 mg by mouth daily am) 90 tablet 0    [DISCONTINUED] metoprolol succinate (TOPROL XL) 50 MG extended release tablet Take 1 tablet by mouth 2 times daily 180 tablet 0    [DISCONTINUED] montelukast (SINGULAIR) 10 MG tablet Take 1 tablet by mouth daily 90 tablet 0    [DISCONTINUED] pantoprazole (PROTONIX) 40 MG tablet Take 1 tablet by mouth daily 90 tablet 0    [DISCONTINUED] loratadine (CLARITIN) 10 MG tablet Take 1 tablet by mouth daily (Patient taking differently: Take 10 mg by mouth daily am) 90 tablet 0     No facility-administered encounter medications on file as of 7/11/2022. [facility] was seen today for follow-up, other and other. Diagnoses and all orders for this visit:    Localized swelling, mass and lump, neck  -     CT SOFT TISSUE NECK WO CONTRAST; Future  -     azithromycin (ZITHROMAX) 250 MG tablet; Take 1 tablet by mouth See Admin Instructions for 5 days 500mg on day 1 followed by 250mg on days 2 - 5    Chronic rhinitis  -     montelukast (SINGULAIR) 10 MG tablet; Take 1 tablet by mouth daily  -     loratadine (CLARITIN) 10 MG tablet;  Take 1 tablet by mouth daily    Diastolic CHF, acute ([facility])  -     metoprolol succinate (TOPROL XL) 50 MG extended

## 2022-07-12 ENCOUNTER — PREP FOR PROCEDURE (OUTPATIENT)
Dept: OPHTHALMOLOGY | Age: 82
End: 2022-07-12

## 2022-07-12 RX ORDER — PHENYLEPHRINE HYDROCHLORIDE 100 MG/ML
1 SOLUTION/ DROPS OPHTHALMIC SEE ADMIN INSTRUCTIONS
Status: CANCELLED | OUTPATIENT
Start: 2022-07-12

## 2022-07-12 RX ORDER — SODIUM CHLORIDE 9 MG/ML
INJECTION, SOLUTION INTRAVENOUS PRN
Status: CANCELLED | OUTPATIENT
Start: 2022-07-12

## 2022-07-12 RX ORDER — SODIUM CHLORIDE 0.9 % (FLUSH) 0.9 %
5-40 SYRINGE (ML) INJECTION PRN
Status: CANCELLED | OUTPATIENT
Start: 2022-07-12

## 2022-07-12 RX ORDER — TROPICAMIDE 10 MG/ML
1 SOLUTION/ DROPS OPHTHALMIC SEE ADMIN INSTRUCTIONS
Status: CANCELLED | OUTPATIENT
Start: 2022-07-12

## 2022-07-12 RX ORDER — SODIUM CHLORIDE 0.9 % (FLUSH) 0.9 %
5-40 SYRINGE (ML) INJECTION EVERY 12 HOURS SCHEDULED
Status: CANCELLED | OUTPATIENT
Start: 2022-07-12

## 2022-07-12 RX ORDER — KETOROLAC TROMETHAMINE 5 MG/ML
1 SOLUTION OPHTHALMIC SEE ADMIN INSTRUCTIONS
Status: CANCELLED | OUTPATIENT
Start: 2022-07-12

## 2022-07-12 RX ORDER — PROPARACAINE HYDROCHLORIDE 5 MG/ML
1 SOLUTION/ DROPS OPHTHALMIC SEE ADMIN INSTRUCTIONS
Status: CANCELLED | OUTPATIENT
Start: 2022-07-12

## 2022-07-12 ASSESSMENT — LIFESTYLE VARIABLES: SMOKING_STATUS: 0

## 2022-07-12 NOTE — ANESTHESIA PRE PROCEDURE
Department of Anesthesiology  Preprocedure Note       Name:  Malathi King   Age:  [de-identified] y.o.  :  1940                                          MRN:  [id number]         Date:  2022      Surgeon: Stefany Kern):  Jessy Hines DO    Procedure: Procedure(s):  CATARACT EXTRACTION WITH INTRAOCULAR LENS IMPLANT LEFT EYE    Medications prior to admission:   Prior to Admission medications    Medication Sig Start Date End Date Taking? Authorizing Provider   pantoprazole (PROTONIX) 40 MG tablet Take 1 tablet by mouth daily 22   Homer Padgett MD   montelukast (SINGULAIR) 10 MG tablet Take 1 tablet by mouth daily 22   Homer Padgett MD   metoprolol succinate (TOPROL XL) 50 MG extended release tablet Take 1 tablet by mouth 2 times daily 22   Homer Padgett MD   isosorbide mononitrate (IMDUR) 30 MG extended release tablet Take 1 tablet by mouth daily am 22   Homer Padgett MD   loratadine (CLARITIN) 10 MG tablet Take 1 tablet by mouth daily 22   Homer Padgett MD   furosemide (LASIX) 20 MG tablet Take 1 tablet by mouth daily 22   Homer Padgett MD   azithromycin (ZITHROMAX) 250 MG tablet Take 1 tablet by mouth See Admin Instructions for 5 days 500mg on day 1 followed by 250mg on days 2 - 5 22  Homer Padgett MD   Evolocumab 140 MG/ML SOAJ 1 SC EVERY 2 WEEKS FOR CHOLESTEROL  Patient taking differently: 1 SC EVERY 2 WEEKS FOR CHOLESTEROL.  Due  22   Lucrecia Dupree MD   Cholecalciferol (VITAMIN D3) 125 MCG (5000 UT) TABS Take 1 tablet by mouth daily 22   Homer Padgett MD   diclofenac sodium (VOLTAREN) 1 % GEL Apply 4 g topically 2 times daily  Patient taking differently: Apply 4 g topically 2 times daily as needed  22   Homer Padgett MD   aspirin 81 MG EC tablet Take 325 mg by mouth daily Follow Dr. Angela Lombardo anticoagulation orders    Historical Provider, MD   acetaminophen (TYLENOL 8 HOUR ARTHRITIS PAIN) 650 MG extended release tablet Take 650 mg by mouth 2 times daily as needed     Historical Provider, MD   mometasone (NASONEX) 50 MCG/ACT nasal spray 1 spray by Each Nostril route as needed     Historical Provider, MD   Albuterol Sulfate (PROAIR HFA IN) Inhale 2 puffs into the lungs 4 times daily    Historical Provider, MD   azelastine (ASTELIN) 0.1 % nasal spray 1 spray by Nasal route 2 times daily Use in each nostril as directed     Historical Provider, MD   ibuprofen (ADVIL;MOTRIN) 200 MG tablet Take 200 mg by mouth every 6 hours as needed for Pain Dr. Sade Monge anticoagulation    Historical Provider, MD   nitroGLYCERIN (NITROSTAT) 0.4 MG SL tablet Place 0.4 mg under the tongue every 5 minutes as needed for Chest pain up to max of 3 total doses. If no relief after 1 dose, call 911. Historical Provider, MD       Current medications:    No current facility-administered medications for this encounter. Current Outpatient Medications   Medication Sig Dispense Refill    pantoprazole (PROTONIX) 40 MG tablet Take 1 tablet by mouth daily 90 tablet 0    montelukast (SINGULAIR) 10 MG tablet Take 1 tablet by mouth daily 90 tablet 0    metoprolol succinate (TOPROL XL) 50 MG extended release tablet Take 1 tablet by mouth 2 times daily 180 tablet 0    isosorbide mononitrate (IMDUR) 30 MG extended release tablet Take 1 tablet by mouth daily am 90 tablet 0    loratadine (CLARITIN) 10 MG tablet Take 1 tablet by mouth daily 90 tablet 0    furosemide (LASIX) 20 MG tablet Take 1 tablet by mouth daily 90 tablet 0    azithromycin (ZITHROMAX) 250 MG tablet Take 1 tablet by mouth See Admin Instructions for 5 days 500mg on day 1 followed by 250mg on days 2 - 5 6 tablet 0    Evolocumab 140 MG/ML SOAJ 1 SC EVERY 2 WEEKS FOR CHOLESTEROL (Patient taking differently: 1 SC EVERY 2 WEEKS FOR CHOLESTEROL.  Due  7/14/22) 2 mL 5    Cholecalciferol (VITAMIN D3) 125 MCG (5000 UT) TABS Take 1 tablet by mouth daily 90 tablet 0    diclofenac sodium (VOLTAREN) 1 % GEL Apply 4 g topically 2 times daily (Patient taking differently: Apply 4 g topically 2 times daily as needed ) 100 g 1    aspirin 81 MG EC tablet Take 325 mg by mouth daily Follow Dr. Familia Madison anticoagulation orders      acetaminophen (TYLENOL 8 HOUR ARTHRITIS PAIN) 650 MG extended release tablet Take 650 mg by mouth 2 times daily as needed       mometasone (NASONEX) 50 MCG/ACT nasal spray 1 spray by Each Nostril route as needed       Albuterol Sulfate (PROAIR HFA IN) Inhale 2 puffs into the lungs 4 times daily      azelastine (ASTELIN) 0.1 % nasal spray 1 spray by Nasal route 2 times daily Use in each nostril as directed       ibuprofen (ADVIL;MOTRIN) 200 MG tablet Take 200 mg by mouth every 6 hours as needed for Pain Dr. Familia Madison anticoagulation      nitroGLYCERIN (NITROSTAT) 0.4 MG SL tablet Place 0.4 mg under the tongue every 5 minutes as needed for Chest pain up to max of 3 total doses. If no relief after 1 dose, call 911. Allergies:     Allergies   Allergen Reactions    Statins [Statins] Swelling and Rash     Muscle aches tongue swelled    Adhesive Tape      Causes reddness    Duricef [Cefadroxil]      Doesn't remember reaction    Gabapentin      Doesn't remember reaction    Oxaprozin      Possible rash    Sulfa Antibiotics Rash       Problem List:    Patient Active Problem List   Diagnosis Code    Chest pain R07.9    Hyperlipidemia LDL goal <100 E78.5    Diabetes mellitus ([facility] [address] E11.9    Hypertension I10    GERD (gastroesophageal reflux disease) E66.9    Diastolic CHF, acute ([facility]) I50.31    Pulmonary embolism ([facility]) I26.99    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 ([facility]) I25.728    Irritable bowel syndrome with constipation K58.1    Chronic rhinitis J31.0       Past Medical History:        Diagnosis Date    Arthritis     shoulders, hips and knees, fingers    Bronchitis     CAD (coronary artery disease)     some blockage, being treated with aspirin  GERD (gastroesophageal reflux disease)     Hyperlipidemia     Hypertension     Sinus infection 2022    LD of doxycline- 22       Past Surgical History:        Procedure Laterality Date    APPENDECTOMY      APPENDECTOMY      BLADDER SURGERY      BLADDER SUSPENSION      CARDIAC SURGERY      CHOLECYSTECTOMY      CHOLECYSTECTOMY      COLONOSCOPY  12    diverticulosis, hemorrhoid    COLONOSCOPY  2015    CORONARY ANGIOPLASTY  10/11/2019    Angioplasty LAD by Dr Rosa Courtney  2019    Bypass x4. [facility].  LIMB-LAD, SVG-OM1 SVG-OM2 SVG-RCA; Dr. Ella Mejia- last seen 1/3/22    CYST REMOVAL      back    DILATION AND CURETTAGE OF UTERUS      HYSTERECTOMY (CERVIX STATUS UNKNOWN)         Social History:    Social History     Tobacco Use    Smoking status: Former Smoker     Packs/day: 1.00     Years: 37.00     Pack years: 37.00     Start date:      Quit date:      Years since quittin.5    Smokeless tobacco: Never Used   Substance Use Topics    Alcohol use: No                                Counseling given: Not Answered      Vital Signs (Current):   Vitals:    22 1417   Weight: 216 lb (98 kg)   Height: 5' 3\" (1.6 m)                                              BP Readings from Last 3 Encounters:   22 132/70   22 128/80   22 124/70       NPO Status:                                                                                 BMI:   Wt Readings from Last 3 Encounters:   22 218 lb 8 oz (99.1 kg)   22 216 lb (98 kg)   22 215 lb 9.6 oz (97.8 kg)     Body mass index is 38.26 kg/m².     CBC:   Lab Results   Component Value Date/Time    WBC 7.1 2021 03:00 PM    RBC 4.58 2021 03:00 PM    HGB 14.4 2021 03:00 PM    HCT 45.5 2021 03:00 PM    MCV 99.3 2021 03:00 PM    RDW 12.7 2021 03:00 PM     2021 03:00 PM       CMP:   Lab Results   Component Value Date/Time  01/25/2022 08:52 AM    K 4.7 01/25/2022 08:52 AM     01/25/2022 08:52 AM    CO2 27 01/25/2022 08:52 AM    BUN 20 01/25/2022 08:52 AM    CREATININE 0.8 01/25/2022 08:52 AM    GFRAA >60 01/25/2022 08:52 AM    LABGLOM >60 01/25/2022 08:52 AM    GLUCOSE 106 01/25/2022 08:52 AM    GLUCOSE 102 09/30/2011 08:18 AM    PROT 7.7 01/25/2022 08:52 AM    CALCIUM 10.8 01/25/2022 08:52 AM    BILITOT 0.3 01/25/2022 08:52 AM    ALKPHOS 64 01/25/2022 08:52 AM    AST 18 01/25/2022 08:52 AM    ALT 18 01/25/2022 08:52 AM       POC Tests: No results for input(s): POCGLU, POCNA, POCK, POCCL, POCBUN, POCHEMO, POCHCT in the last 72 hours. Coags:   Lab Results   Component Value Date/Time    PROTIME 12.2 10/09/2019 10:09 AM    PROTIME 10.2 10/26/2010 10:30 AM    INR 1.1 10/09/2019 10:09 AM    APTT 37.7 10/09/2019 10:09 AM       HCG (If Applicable): No results found for: PREGTESTUR, PREGSERUM, HCG, HCGQUANT     ABGs: No results found for: PHART, PO2ART, PUH4MON, EAM2JSD, BEART, T7URJHLO     Type & Screen (If Applicable):  No results found for: LABABO, LABRH    Drug/Infectious Status (If Applicable):  No results found for: HIV, HEPCAB    COVID-19 Screening (If Applicable): No results found for: COVID19        Anesthesia Evaluation  Patient summary reviewed  Airway: Mallampati: II  TM distance: >3 FB   Neck ROM: full  Mouth opening: > = 3 FB   Dental: normal exam         Pulmonary: breath sounds clear to auscultation      (-) not a current smoker (ex 37 pk yrs)                          ROS comment: Hx PE   Cardiovascular:    (+) hypertension:, angina:, CAD:, CABG/stent:, CHF:, hyperlipidemia        Rhythm: regular  Rate: normal           Beta Blocker:  Dose within 24 Hrs         Neuro/Psych:               GI/Hepatic/Renal:   (+) GERD:,           Endo/Other:    (+) DiabetesType II DM, , : arthritis:., .                 Abdominal:       Abdomen: soft. Vascular:   + PE.        Other Findings:           Anesthesia Plan      MAC ASA 3       Induction: intravenous. continuous noninvasive hemodynamic monitor        Plan discussed with CRNA. Garry Aguilar MD   7/12/2022      Pt seen questions answered H&P reviewed cleared accepts RIKKI Villa M.D 07/13/2022    Patient seen and evaluated prior to induction, anesthesia plan of care reviewed.    HORTENSIA Amaro - CRNA

## 2022-07-13 ENCOUNTER — HOSPITAL ENCOUNTER (OUTPATIENT)
Age: 82
Setting detail: OUTPATIENT SURGERY
Discharge: HOME | End: 2022-07-13
Attending: OPHTHALMOLOGY | Admitting: OPHTHALMOLOGY
Payer: MEDICARE

## 2022-07-13 ENCOUNTER — ANESTHESIA (OUTPATIENT)
Dept: OPERATING ROOM | Age: 82
End: 2022-07-13
Payer: MEDICARE

## 2022-07-13 VITALS
BODY MASS INDEX: 38.09 KG/M2 | HEART RATE: 58 BPM | HEIGHT: 63 IN | DIASTOLIC BLOOD PRESSURE: 56 MMHG | SYSTOLIC BLOOD PRESSURE: 104 MMHG | WEIGHT: 215 LBS | RESPIRATION RATE: 16 BRPM | TEMPERATURE: 97 F | OXYGEN SATURATION: 95 %

## 2022-07-13 PROCEDURE — 2500000003 HC RX 250 WO HCPCS: Performed by: OPHTHALMOLOGY

## 2022-07-13 PROCEDURE — V2632 POST CHMBR INTRAOCULAR LENS: HCPCS | Performed by: OPHTHALMOLOGY

## 2022-07-13 PROCEDURE — 3600000013 HC SURGERY LEVEL 3 ADDTL 15MIN: Performed by: OPHTHALMOLOGY

## 2022-07-13 PROCEDURE — 7100000011 HC PHASE II RECOVERY - ADDTL 15 MIN: Performed by: OPHTHALMOLOGY

## 2022-07-13 PROCEDURE — 3700000001 HC ADD 15 MINUTES (ANESTHESIA): Performed by: OPHTHALMOLOGY

## 2022-07-13 PROCEDURE — 7100000010 HC PHASE II RECOVERY - FIRST 15 MIN: Performed by: OPHTHALMOLOGY

## 2022-07-13 PROCEDURE — 2720000010 HC SURG SUPPLY STERILE: Performed by: OPHTHALMOLOGY

## 2022-07-13 PROCEDURE — 6360000002 HC RX W HCPCS: Performed by: OPHTHALMOLOGY

## 2022-07-13 PROCEDURE — 3700000000 HC ANESTHESIA ATTENDED CARE: Performed by: OPHTHALMOLOGY

## 2022-07-13 PROCEDURE — 6370000000 HC RX 637 (ALT 250 FOR IP): Performed by: OPHTHALMOLOGY

## 2022-07-13 PROCEDURE — 3600000003 HC SURGERY LEVEL 3 BASE: Performed by: OPHTHALMOLOGY

## 2022-07-13 PROCEDURE — 2709999900 HC NON-CHARGEABLE SUPPLY: Performed by: OPHTHALMOLOGY

## 2022-07-13 PROCEDURE — 6360000002 HC RX W HCPCS: Performed by: NURSE ANESTHETIST, CERTIFIED REGISTERED

## 2022-07-13 PROCEDURE — 2580000003 HC RX 258: Performed by: ANESTHESIOLOGY

## 2022-07-13 DEVICE — LENS INTRAOCULAR BCNVX +22.0 DIOPT 6X13 MM NAT ACRYSOF IQ: Type: IMPLANTABLE DEVICE | Site: EYE | Status: FUNCTIONAL

## 2022-07-13 RX ORDER — MIDAZOLAM HYDROCHLORIDE 1 MG/ML
INJECTION INTRAMUSCULAR; INTRAVENOUS PRN
Status: DISCONTINUED | OUTPATIENT
Start: 2022-07-13 | End: 2022-07-13 | Stop reason: SDUPTHER

## 2022-07-13 RX ORDER — SODIUM CHLORIDE, SODIUM LACTATE, POTASSIUM CHLORIDE, CALCIUM CHLORIDE 600; 310; 30; 20 MG/100ML; MG/100ML; MG/100ML; MG/100ML
INJECTION, SOLUTION INTRAVENOUS CONTINUOUS
Status: DISCONTINUED | OUTPATIENT
Start: 2022-07-13 | End: 2022-07-13 | Stop reason: HOSPADM

## 2022-07-13 RX ORDER — PROPARACAINE HYDROCHLORIDE 5 MG/ML
1 SOLUTION/ DROPS OPHTHALMIC SEE ADMIN INSTRUCTIONS
Status: COMPLETED | OUTPATIENT
Start: 2022-07-13 | End: 2022-07-13

## 2022-07-13 RX ORDER — KETOROLAC TROMETHAMINE 5 MG/ML
1 SOLUTION OPHTHALMIC
Status: DISPENSED | OUTPATIENT
Start: 2022-07-13 | End: 2022-07-13

## 2022-07-13 RX ORDER — PHENYLEPHRINE HYDROCHLORIDE 100 MG/ML
1 SOLUTION/ DROPS OPHTHALMIC SEE ADMIN INSTRUCTIONS
Status: COMPLETED | OUTPATIENT
Start: 2022-07-13 | End: 2022-07-13

## 2022-07-13 RX ORDER — FENTANYL CITRATE 50 UG/ML
INJECTION, SOLUTION INTRAMUSCULAR; INTRAVENOUS PRN
Status: DISCONTINUED | OUTPATIENT
Start: 2022-07-13 | End: 2022-07-13 | Stop reason: SDUPTHER

## 2022-07-13 RX ORDER — TROPICAMIDE 10 MG/ML
1 SOLUTION/ DROPS OPHTHALMIC SEE ADMIN INSTRUCTIONS
Status: COMPLETED | OUTPATIENT
Start: 2022-07-13 | End: 2022-07-13

## 2022-07-13 RX ORDER — TRIAMCINOLONE ACETONIDE 40 MG/ML
INJECTION, SUSPENSION INTRA-ARTICULAR; INTRAMUSCULAR PRN
Status: DISCONTINUED | OUTPATIENT
Start: 2022-07-13 | End: 2022-07-13 | Stop reason: ALTCHOICE

## 2022-07-13 RX ORDER — SODIUM CHLORIDE 9 MG/ML
INJECTION, SOLUTION INTRAVENOUS PRN
Status: DISCONTINUED | OUTPATIENT
Start: 2022-07-13 | End: 2022-07-13 | Stop reason: HOSPADM

## 2022-07-13 RX ORDER — SODIUM CHLORIDE 0.9 % (FLUSH) 0.9 %
5-40 SYRINGE (ML) INJECTION EVERY 12 HOURS SCHEDULED
Status: DISCONTINUED | OUTPATIENT
Start: 2022-07-13 | End: 2022-07-13 | Stop reason: HOSPADM

## 2022-07-13 RX ORDER — TETRACAINE HYDROCHLORIDE 5 MG/ML
SOLUTION OPHTHALMIC PRN
Status: DISCONTINUED | OUTPATIENT
Start: 2022-07-13 | End: 2022-07-13 | Stop reason: ALTCHOICE

## 2022-07-13 RX ORDER — BALANCED SALT SOLUTION 6.4; .75; .48; .3; 3.9; 1.7 MG/ML; MG/ML; MG/ML; MG/ML; MG/ML; MG/ML
SOLUTION OPHTHALMIC PRN
Status: DISCONTINUED | OUTPATIENT
Start: 2022-07-13 | End: 2022-07-13 | Stop reason: ALTCHOICE

## 2022-07-13 RX ORDER — MOXIFLOXACIN 5 MG/ML
SOLUTION/ DROPS OPHTHALMIC PRN
Status: DISCONTINUED | OUTPATIENT
Start: 2022-07-13 | End: 2022-07-13 | Stop reason: ALTCHOICE

## 2022-07-13 RX ORDER — SODIUM CHLORIDE 0.9 % (FLUSH) 0.9 %
5-40 SYRINGE (ML) INJECTION PRN
Status: DISCONTINUED | OUTPATIENT
Start: 2022-07-13 | End: 2022-07-13 | Stop reason: HOSPADM

## 2022-07-13 RX ADMIN — TROPICAMIDE 1 DROP: 10 SOLUTION/ DROPS OPHTHALMIC at 07:08

## 2022-07-13 RX ADMIN — TROPICAMIDE 1 DROP: 10 SOLUTION/ DROPS OPHTHALMIC at 07:15

## 2022-07-13 RX ADMIN — PHENYLEPHRINE HYDROCHLORIDE 1 DROP: 100 SOLUTION/ DROPS OPHTHALMIC at 07:15

## 2022-07-13 RX ADMIN — MIDAZOLAM 1 MG: 1 INJECTION INTRAMUSCULAR; INTRAVENOUS at 07:34

## 2022-07-13 RX ADMIN — PROPARACAINE HYDROCHLORIDE 1 DROP: 5 SOLUTION/ DROPS OPHTHALMIC at 07:15

## 2022-07-13 RX ADMIN — MIDAZOLAM 1 MG: 1 INJECTION INTRAMUSCULAR; INTRAVENOUS at 07:38

## 2022-07-13 RX ADMIN — PROPARACAINE HYDROCHLORIDE 1 DROP: 5 SOLUTION/ DROPS OPHTHALMIC at 07:08

## 2022-07-13 RX ADMIN — PHENYLEPHRINE HYDROCHLORIDE 1 DROP: 100 SOLUTION/ DROPS OPHTHALMIC at 07:08

## 2022-07-13 RX ADMIN — PHENYLEPHRINE HYDROCHLORIDE 1 DROP: 100 SOLUTION/ DROPS OPHTHALMIC at 07:22

## 2022-07-13 RX ADMIN — PROPARACAINE HYDROCHLORIDE 1 DROP: 5 SOLUTION/ DROPS OPHTHALMIC at 07:22

## 2022-07-13 RX ADMIN — TROPICAMIDE 1 DROP: 10 SOLUTION/ DROPS OPHTHALMIC at 07:22

## 2022-07-13 RX ADMIN — SODIUM CHLORIDE, POTASSIUM CHLORIDE, SODIUM LACTATE AND CALCIUM CHLORIDE: 600; 310; 30; 20 INJECTION, SOLUTION INTRAVENOUS at 07:19

## 2022-07-13 RX ADMIN — FENTANYL CITRATE 50 MCG: 50 INJECTION INTRAMUSCULAR; INTRAVENOUS at 07:35

## 2022-07-13 ASSESSMENT — PAIN - FUNCTIONAL ASSESSMENT: PAIN_FUNCTIONAL_ASSESSMENT: NONE - DENIES PAIN

## 2022-07-13 ASSESSMENT — PAIN SCALES - GENERAL: PAINLEVEL_OUTOF10: 0

## 2022-07-13 NOTE — H&P
Update History & Physical    The patient's History and Physical was reviewed with the patient and there were no significant changes. I examined the patient and there were no significant changes from the previous History and Physical.    Plan: The risk, benefits, expected outcome, and alternative to the recommended procedure have been discussed with the patient. Patient understands and wants to proceed with the procedure.     Electronically signed by Keila Benito DO on 7/13/22 at 7:51 AM EDT

## 2022-07-13 NOTE — ANESTHESIA POSTPROCEDURE EVALUATION
Department of Anesthesiology  Postprocedure Note    Patient: Alma Guzman  MRN: [id number]  YOB: 1940  Date of evaluation: 7/13/2022      Procedure Summary     Date: 07/13/22 Room / Location: [address] [place] [address] 02 / [address] [facility]    Anesthesia Start: 0730 Anesthesia Stop: 0503    Procedure: CATARACT EXTRACTION WITH INTRAOCULAR LENS IMPLANT LEFT EYE (Left ) Diagnosis:       Combined forms of age-related cataract of left eye      (Combined forms of age-related cataract of left  eye [H25.812])    Surgeons: Tessy Corrigan DO Responsible Provider: Nkechi Almeida MD    Anesthesia Type: MAC ASA Status: 3          Anesthesia Type: No value filed.     Theresa Phase I: Theresa Score: 10    Theresa Phase II: Theresa Score: 10      Anesthesia Post Evaluation    Patient location during evaluation: PACU  Patient participation: complete - patient participated  Level of consciousness: awake  Pain score: 2  Airway patency: patent  Nausea & Vomiting: no nausea  Complications: no  Cardiovascular status: blood pressure returned to baseline  Respiratory status: acceptable  Hydration status: euvolemic

## 2022-07-13 NOTE — OP NOTE
Ophthalmology Operative Note          Patient:  Wali Andino  :   1940  Age/Sex:  [de-identified] y.o. female  Attending:  Keila Benito DO  Mrn:   [id number]  Acct:   [de-identified]       Adm:  2022  6:15 AM  Facility: [facility]:  Phacoemulsification with PCIOL implant left eye    Date of Procedure: 2022      PREOPERATIVE DIAGNOSIS: Visually significant cataract left eye; Astigmatism    POSTOPERATIVE DIAGNOSIS: Visually significant cataract left eye; Astigmatism    Primary Surgeon: Naren Bell    Consent Obtained: Obtained    Time Out Performed: Yes    ANESTHESIA: Mac Topical    ESTIMATED BLOOD LOSS:  Minimal    COMPLICATIONS:  None        DESCRIPTION OF PROCEDURE:      On the above-mentioned date, the patient was taken to the operative suite and prepared for ocular surgery. After the induction of sedation by the Department of Anesthesia, the operative eye was prepped and draped in usual sterile manner for ocular surgery. A lid speculum was placed to retract the upper and lower lids from each other. A paracentesis incision was made at the 12 o'clock position. Viscoelastic was then injected into the anterior chamber. A clear corneal incision was made at the 3 o'clock position, forming a corneal valve. A capsulorhexis was then performed with the Utrata forceps. BSS solution on a cannula was then used to hydrodissect the cortex from the lens capsule. Following this, the nucleus was rotated within the lens capsule. The phacoemulsifier was then introduced into the anterior chamber through the corneal valve incision and engaged the nucleus. Phacoemulsification was then carried out, dividing the nucleus into quadrants. The phacoemulsifier was then placed on burst mode and each of the quadrants was individually phacoemulsified and removed.   Once all quadrants had been removed, the phacoemulsification needle was removed and exchanged for an irrigation/aspirating (I/A) hand piece. This was employed to remove the cortical material remaining in the capsule. Once this was completed, the capsule was refilled with viscoelastic material.  The preselected intraocular lens was then placed in a lens injecting cartridge and passed through the corneal valve incision into the anterior chamber and thence into the capsular bag. It was then injected into the capsular bag and rotated into position with a Kuglen hook. It centered nicely, without difficulty. Once this was completed, the viscoelastic material was removed with the I/A device. The anterior chamber was reconstituted with BSS solution. The corneal valve was hydrated and the wound was checked for leaks. None were found, so it was elected to leave the wound sutureless. 0.1 cc of Vigamox was injected intracamerally and 4 mg of Kenalog was injected sub tenons. The drapes were removed and 2 drops of Vigamox solution were dropped into the eye. A clear plastic shield was placed over the eye and the patient was removed to the recovery area, having tolerated the procedure well with no complications.       Electronically signed by Naeem Oakley DO on 7/13/2022 at 7:52 AM

## 2022-07-22 RX ORDER — SODIUM CHLORIDE 0.9 % (FLUSH) 0.9 %
5-40 SYRINGE (ML) INJECTION EVERY 12 HOURS SCHEDULED
Status: CANCELLED | OUTPATIENT
Start: 2022-07-22

## 2022-07-22 RX ORDER — PROPARACAINE HYDROCHLORIDE 5 MG/ML
1 SOLUTION/ DROPS OPHTHALMIC SEE ADMIN INSTRUCTIONS
Status: CANCELLED | OUTPATIENT
Start: 2022-07-22

## 2022-07-22 RX ORDER — SODIUM CHLORIDE 9 MG/ML
INJECTION, SOLUTION INTRAVENOUS PRN
Status: CANCELLED | OUTPATIENT
Start: 2022-07-22

## 2022-07-22 RX ORDER — PHENYLEPHRINE HYDROCHLORIDE 100 MG/ML
1 SOLUTION/ DROPS OPHTHALMIC SEE ADMIN INSTRUCTIONS
Status: CANCELLED | OUTPATIENT
Start: 2022-07-22

## 2022-07-22 RX ORDER — SODIUM CHLORIDE 0.9 % (FLUSH) 0.9 %
5-40 SYRINGE (ML) INJECTION PRN
Status: CANCELLED | OUTPATIENT
Start: 2022-07-22

## 2022-07-22 RX ORDER — TROPICAMIDE 10 MG/ML
1 SOLUTION/ DROPS OPHTHALMIC SEE ADMIN INSTRUCTIONS
Status: CANCELLED | OUTPATIENT
Start: 2022-07-22

## 2022-07-22 RX ORDER — KETOROLAC TROMETHAMINE 5 MG/ML
1 SOLUTION OPHTHALMIC SEE ADMIN INSTRUCTIONS
Status: CANCELLED | OUTPATIENT
Start: 2022-07-22

## 2022-07-29 ENCOUNTER — HOSPITAL ENCOUNTER (OUTPATIENT)
Dept: CT IMAGING | Age: 82
Discharge: HOME | End: 2022-07-29
Payer: MEDICARE

## 2022-07-29 DIAGNOSIS — R22.1 LOCALIZED SWELLING, MASS AND LUMP, NECK: ICD-10-CM

## 2022-07-29 PROCEDURE — 70490 CT SOFT TISSUE NECK W/O DYE: CPT

## 2022-08-04 ENCOUNTER — TELEPHONE (OUTPATIENT)
Dept: PRIMARY CARE CLINIC | Age: 82
End: 2022-08-04

## 2022-08-04 DIAGNOSIS — E04.1 THYROID NODULE: Primary | ICD-10-CM

## 2022-08-09 NOTE — TELEPHONE ENCOUNTER
Contacted patient and informed her of the CT of neck results per Dr. Dylan Mix message and also informed her an US of the Thyroid has been ordered by Dr. Dylan Mix to further evaluate a lump on her thyroid that was discovered during CT scan of neck. Patient asked appropriate questions of when and how the test will be scheduled and about her next appt with Dr. Dylan Mix. .  Verbalized understanding by repeating directions appropriately.

## 2022-08-11 ENCOUNTER — OFFICE VISIT (OUTPATIENT)
Dept: CARDIOLOGY CLINIC | Age: 82
End: 2022-08-11
Payer: MEDICARE

## 2022-08-11 VITALS
RESPIRATION RATE: 16 BRPM | BODY MASS INDEX: 38.27 KG/M2 | WEIGHT: 216 LBS | SYSTOLIC BLOOD PRESSURE: 124 MMHG | HEIGHT: 63 IN | DIASTOLIC BLOOD PRESSURE: 70 MMHG | HEART RATE: 59 BPM

## 2022-08-11 DIAGNOSIS — I25.10 CORONARY ARTERY DISEASE INVOLVING NATIVE CORONARY ARTERY OF NATIVE HEART WITHOUT ANGINA PECTORIS: Primary | ICD-10-CM

## 2022-08-11 PROCEDURE — 99214 OFFICE O/P EST MOD 30 MIN: CPT | Performed by: INTERNAL MEDICINE

## 2022-08-11 PROCEDURE — 93000 ELECTROCARDIOGRAM COMPLETE: CPT | Performed by: INTERNAL MEDICINE

## 2022-08-11 PROCEDURE — 1123F ACP DISCUSS/DSCN MKR DOCD: CPT | Performed by: INTERNAL MEDICINE

## 2022-08-11 NOTE — PROGRESS NOTES
[facility] Cardiology Progress Note  Dr. Sivakumar Ruff      Referring Physician: Jose F Kendrick MD  CHIEF COMPLAINT:   Chief Complaint   Patient presents with    Heart Problem     6 month. Patient has no complaints        HISTORY OF PRESENT ILLNESS:    Patient is [de-identified]year old female with history of coronary artery disease, hyperlipidemia, diabetes mellitus, GERD, diastolic CHF, pulmonary embolism, is here for follow-up appointment. Shortness of breath is at baseline, patient denies any chest pain, no lightheadedness, no dizziness, no palpitations, no pedal edema, no PND, no orthopnea, no syncope, no presyncopal episodes. Complaining of back and leg pain    Past Medical History:   Diagnosis Date    Arthritis     shoulders, hips and knees, fingers    Bronchitis     CAD (coronary artery disease)     some blockage, being treated with aspirin; Dr. Cresencio Ellison last seen 1/3/22    GERD (gastroesophageal reflux disease)     Hyperlipidemia     Hypertension     Sinus infection 06/22/2022    LD of doxycline- 7/2/22         Past Surgical History:   Procedure Laterality Date    APPENDECTOMY      APPENDECTOMY      BLADDER SURGERY      BLADDER SUSPENSION      CARDIAC SURGERY      CATARACT EXTRACTION Left 07/01/2022    CHOLECYSTECTOMY      CHOLECYSTECTOMY      COLONOSCOPY  11/29/2012    diverticulosis, hemorrhoid    COLONOSCOPY  09/2015    CORONARY ANGIOPLASTY  10/11/2019    Angioplasty LAD by Dr Armand Fernandez  06/17/2019    Bypass x4.   [facility].  LIMB-LAD, SVG-OM1 SVG-OM2 SVG-RCA; Dr. Cresencio Ellison- last seen 1/3/22    CYST REMOVAL      back    DILATION AND CURETTAGE OF UTERUS      HYSTERECTOMY (CERVIX STATUS UNKNOWN)      INTRACAPSULAR CATARACT EXTRACTION Left 07/13/2022    CATARACT EXTRACTION WITH INTRAOCULAR LENS IMPLANT LEFT EYE performed by Petar Loya DO at [address] [place] [address]         Current Outpatient Medications   Medication Sig Dispense Refill    pantoprazole (PROTONIX) 40 MG tablet Take 1 tablet by mouth daily (Patient taking differently: Take 40 mg by mouth in the morning. am.) 90 tablet 0    montelukast (SINGULAIR) 10 MG tablet Take 1 tablet by mouth daily 90 tablet 0    metoprolol succinate (TOPROL XL) 50 MG extended release tablet Take 1 tablet by mouth 2 times daily 180 tablet 0    isosorbide mononitrate (IMDUR) 30 MG extended release tablet Take 1 tablet by mouth daily am 90 tablet 0    loratadine (CLARITIN) 10 MG tablet Take 1 tablet by mouth daily 90 tablet 0    furosemide (LASIX) 20 MG tablet Take 1 tablet by mouth daily 90 tablet 0    Evolocumab 140 MG/ML SOAJ 1 SC EVERY 2 WEEKS FOR CHOLESTEROL (Patient taking differently: 1 SC EVERY 2 WEEKS FOR CHOLESTEROL. Due  7/28/22) 2 mL 5    Cholecalciferol (VITAMIN D3) 125 MCG (5000 UT) TABS Take 1 tablet by mouth daily 90 tablet 0    diclofenac sodium (VOLTAREN) 1 % GEL Apply 4 g topically 2 times daily (Patient taking differently: Apply 4 g topically 2 times daily as needed) 100 g 1    aspirin 81 MG EC tablet Take 81 mg by mouth in the morning. Follow Dr. Christa Meyer anticoagulation orders. acetaminophen (TYLENOL) 650 MG extended release tablet Take 650 mg by mouth 2 times daily as needed       mometasone (NASONEX) 50 MCG/ACT nasal spray 1 spray by Each Nostril route as needed       Albuterol Sulfate (PROAIR HFA IN) Inhale 2 puffs into the lungs 4 times daily      ibuprofen (ADVIL;MOTRIN) 200 MG tablet Take 200 mg by mouth every 6 hours as needed for Pain Dr. Christa Meyer anticoagulation      nitroGLYCERIN (NITROSTAT) 0.4 MG SL tablet Place 0.4 mg under the tongue every 5 minutes as needed for Chest pain up to max of 3 total doses. If no relief after 1 dose, call 911.      azelastine (ASTELIN) 0.1 % nasal spray 1 spray by Nasal route 2 times daily Use in each nostril as directed  (Patient not taking: Reported on 8/11/2022)       No current facility-administered medications for this visit.          Allergies as of 08/11/2022 - Fully Reviewed 08/11/2022 Allergen Reaction Noted    Statins [statins] Swelling and Rash 2012    Adhesive tape  2015    Duricef [cefadroxil]  2012    Gabapentin  2012    Oxaprozin  2012    Sulfa antibiotics Rash 2015       Social History     Socioeconomic History    Marital status:       Spouse name: Not on file    Number of children: Not on file    Years of education: Not on file    Highest education level: Not on file   Occupational History    Not on file   Tobacco Use    Smoking status: Former     Packs/day: 1.00     Years: 37.00     Pack years: 37.00     Types: Cigarettes     Start date:  [facility]     Quit date:      Years since quittin.6    Smokeless tobacco: Never   Vaping Use    Vaping Use: Never used   Substance and Sexual Activity    Alcohol use: No    Drug use: No    Sexual activity: Not on file   Other Topics Concern    Not on file   Social History Narrative    Not on file     Social Determinants of Health     Financial Resource Strain: Low Risk     Difficulty of Paying Living Expenses: Not hard at all   Food Insecurity: No Food Insecurity    Worried About Running Out of Food in the Last Year: Never true    Ran Out of Food in the Last Year: Never true   Transportation Needs: Not on file   Physical Activity: Not on file   Stress: Not on file   Social Connections: Not on file   Intimate Partner Violence: Not on file   Housing Stability: Not on file       Family History   Problem Relation Age of Onset    Cancer Mother     Stroke Father        REVIEW OF SYSTEMS:   CONSTITUTIONAL:  negative for  fevers, chills, sweats and fatigue  HEENT:  negative for  tinnitus, earaches, nasal congestion and epistaxis  RESPIRATORY:  negative for  dry cough, cough with sputum, wheezing and hemoptysis  GASTROINTESTINAL:  negative for nausea, vomiting, diarrhea, constipation, pruritus and jaundice  HEMATOLOGIC/LYMPHATIC:  negative for easy bruising, bleeding, lymphadenopathy and petechiae  ENDOCRINE: negative for heat intolerance, cold intolerance, tremor, hair loss and diabetic symptoms including neither polyuria nor polydipsia nor blurred vision  MUSCULOSKELETAL: Back and leg pain  NEUROLOGICAL:  negative for memory problems, speech problems, visual disturbance, dysphagia, weakness and numbness      PHYSICAL EXAM:   CONSTITUTIONAL:  awake, alert, cooperative, no apparent distress, and appears stated age  HEENT:  Moist and pink mucous membranes, normocephalic, without obvious abnormality, atraumatic  NECK:  Supple, symmetrical, trachea midline, no adenopathy, thyroid symmetric, not enlarged and no tenderness, skin normal  LUNGS:  No increased work of breathing, good air exchange, clear to auscultation bilaterally, no crackles or wheezing  CARDIOVASCULAR:  Normal apical impulse, regular rate and rhythm, normal S1 and S2, no S3 or S4,no murmur,and no pedal edema, no carotid bruit, no JVD, no pulsating masses. ABDOMEN:soft, nontender, no hepatomegaly, no splenomegaly, bowel sounds positive. CHEST:expands symmetrically, nontender to palpation  MUSCULOSKELETAL:  No clubbing, no cyanosis, no pedal edema,there is no redness, warmth, or swelling of the joints  full range of motion noted. NEUROLOGIC:  Alert, awake, oriented ×3. SKIN: no bruises, no bleeding, normal skin color, texture, turgor and no redness, warmth, or swelling      /70   Pulse 59   Resp 16   Ht 5' 3\" (1.6 m)   Wt 216 lb (98 kg)   BMI 38.26 kg/m²     DATA:   I personally reviewed the visit EKG with the following interpretation: Sinus rhythm, possible old anteroseptal wall MI age undetermined, low voltage QRS, nonspecific T wave changes    EKG 1/3/2022, sinus rhythm, old anterior wall MI age undetermined, nonspecific T wave changes    EKG 5/28/21  Sinus bradycardia, low voltage QRS, possible old anteroseptal wall MI age undetermined    ECHO: 7/3/19 Summary   Left ventricle grossly normal in size. Normal LV segmental wall motion.    Normal left ventricular wall thickness. Estimated left ventricular ejection fraction is 67±5%. <50% criteria for diastolic dysfunction. The LAESV Index is <34ml/m2. Normal right ventricular size and function   Physiologic and/or trace mitral regurgitation is present. The tricuspid valve appears structurally normal.   RVSP is 30 mmHg. Physiologic and/or trace tricuspid regurgitation. There is a small pericardial effusion. Left pleural effusion. Technically fair quality study. No comparison study available. Suggest clinical correlation. Stress Test:5/13/2019) Clinical: Negative for Regadenoson induced angina. ECG: Negative for Regadenoson induced ischemia. Nuclear scan: a. Moderate size, severely intense, reversible ischemia involving the anterior wall and apex. b. Normal wall motion  c. Normal ejection fraction. Angiography: 10/11/19 1. Unsuccessful attempt to heavily calcified lesion of left circumflex  artery and diagonal branch. 2.  Totally occluded LAD with patent LIMA to LAD. 3.  Significant calcified lesion at the proximal first diagonal branch. 4.  Significant disease involving OM1, OM2 and borderline lesion  involving the proximal circumflex artery and significant lesion  involving the distal left circumflex artery. 5.  Totally occluded RCA. 6.  Totally occluded SVG graft to OM1, OM2.  7.  Totally occluded SVG graft to RCA. 8.  Patent LIMA to distal LAD with mildly to moderately diffusely  diseased LAD distal to LIMA to LAD anastomosis. CABG - (6/17/2019) Coronary artery bypass grafting times four, LIMA to the LAD, sequential vein bypass graft to the first and second obtuse marginal artery. Saphenous vein bypass graft to the posterior descending artery.     Cardiology Labs: BMP:    Lab Results   Component Value Date/Time     01/25/2022 08:52 AM    K 4.7 01/25/2022 08:52 AM     01/25/2022 08:52 AM    CO2 27 01/25/2022 08:52 AM    BUN 20 01/25/2022 08:52 AM OM2.  *  Totally occluded SVG graft to RCA. *  Patent LIMA to distal LAD with mildly to moderately diffusely diseased LAD distal to LIMA to LAD anastomosis. Was referred to [facility] for second opinion, continuing medical therapy was advised, will continue current treatment  2. Hypertension: Controlled, continue current treatment. 3.  Hyperlipidemi: On Repatha  4. Personal history of pulmonary embolism   5. Back and leg pain:    RECOMMENDATIONS:   1. Continue current treatment  2. Preventive cardiology: Low-salt, low-cholesterol diet, daily exercise, total cholesterol of less than 200, LDL of less than 70, were all advised. 3.  Follow-up with Dr. Cesar Garcia as scheduled  4. Follow-up with Dr. Ahsan Sanders in 6 months, sooner if symptomatic for any reason    I have reviewed my findings and recommendations with patient    Electronically signed by Sada Hayes MD on 8/11/2022 at 11:32 AM  NOTE: This report was transcribed using voice recognition software.  Every effort was made to ensure accuracy; however, inadvertent computerized transcription errors may be present

## 2022-08-18 ENCOUNTER — HOSPITAL ENCOUNTER (OUTPATIENT)
Dept: ULTRASOUND IMAGING | Age: 82
Discharge: HOME | End: 2022-08-18
Payer: MEDICARE

## 2022-08-18 DIAGNOSIS — E04.1 THYROID NODULE: ICD-10-CM

## 2022-08-18 PROCEDURE — 76536 US EXAM OF HEAD AND NECK: CPT

## 2022-08-19 DIAGNOSIS — I10 PRIMARY HYPERTENSION: ICD-10-CM

## 2022-08-19 DIAGNOSIS — I25.728 CORONARY ARTERY DISEASE OF AUTOLOGOUS BYPASS GRAFT WITH STABLE ANGINA PECTORIS (HCC): ICD-10-CM

## 2022-08-19 DIAGNOSIS — E78.5 HYPERLIPIDEMIA LDL GOAL <100: ICD-10-CM

## 2022-08-19 LAB
ALBUMIN SERPL-MCNC: 4.4 G/DL (ref 3.5–5.2)
ALP BLD-CCNC: 62 U/L (ref 35–104)
ALT SERPL-CCNC: 16 U/L (ref 0–32)
ANION GAP SERPL CALCULATED.3IONS-SCNC: 13 MMOL/L (ref 7–16)
AST SERPL-CCNC: 19 U/L (ref 0–31)
BASOPHILS ABSOLUTE: 0.05 E9/L (ref 0–0.2)
BASOPHILS RELATIVE PERCENT: 0.7 % (ref 0–2)
BILIRUB SERPL-MCNC: 0.3 MG/DL (ref 0–1.2)
BUN BLDV-MCNC: 17 MG/DL (ref 6–23)
CALCIUM SERPL-MCNC: 10.8 MG/DL (ref 8.6–10.2)
CHLORIDE BLD-SCNC: 105 MMOL/L (ref 98–107)
CHOLESTEROL, TOTAL: 158 MG/DL (ref 0–199)
CO2: 25 MMOL/L (ref 22–29)
CREAT SERPL-MCNC: 0.8 MG/DL (ref 0.5–1)
EOSINOPHILS ABSOLUTE: 0.16 E9/L (ref 0.05–0.5)
EOSINOPHILS RELATIVE PERCENT: 2.4 % (ref 0–6)
GFR AFRICAN AMERICAN: >60
GFR NON-AFRICAN AMERICAN: >60 ML/MIN/1.73
GLUCOSE BLD-MCNC: 97 MG/DL (ref 74–99)
HCT VFR BLD CALC: 44.6 % (ref 34–48)
HDLC SERPL-MCNC: 44 MG/DL
HEMOGLOBIN: 14.3 G/DL (ref 11.5–15.5)
IMMATURE GRANULOCYTES #: 0.02 E9/L
IMMATURE GRANULOCYTES %: 0.3 % (ref 0–5)
LDL CHOLESTEROL CALCULATED: 88 MG/DL (ref 0–99)
LYMPHOCYTES ABSOLUTE: 1.53 E9/L (ref 1.5–4)
LYMPHOCYTES RELATIVE PERCENT: 22.5 % (ref 20–42)
MCH RBC QN AUTO: 31.2 PG (ref 26–35)
MCHC RBC AUTO-ENTMCNC: 32.1 % (ref 32–34.5)
MCV RBC AUTO: 97.2 FL (ref 80–99.9)
MONOCYTES ABSOLUTE: 0.57 E9/L (ref 0.1–0.95)
MONOCYTES RELATIVE PERCENT: 8.4 % (ref 2–12)
NEUTROPHILS ABSOLUTE: 4.47 E9/L (ref 1.8–7.3)
NEUTROPHILS RELATIVE PERCENT: 65.7 % (ref 43–80)
PDW BLD-RTO: 12.4 FL (ref 11.5–15)
PLATELET # BLD: 224 E9/L (ref 130–450)
PMV BLD AUTO: 9.9 FL (ref 7–12)
POTASSIUM SERPL-SCNC: 4.2 MMOL/L (ref 3.5–5)
RBC # BLD: 4.59 E12/L (ref 3.5–5.5)
SODIUM BLD-SCNC: 143 MMOL/L (ref 132–146)
TOTAL PROTEIN: 7.4 G/DL (ref 6.4–8.3)
TRIGL SERPL-MCNC: 132 MG/DL (ref 0–149)
VLDLC SERPL CALC-MCNC: 26 MG/DL
WBC # BLD: 6.8 E9/L (ref 4.5–11.5)

## 2022-08-23 ENCOUNTER — OFFICE VISIT (OUTPATIENT)
Dept: PRIMARY CARE CLINIC | Age: 82
End: 2022-08-23
Payer: MEDICARE

## 2022-08-23 VITALS
WEIGHT: 216.7 LBS | HEART RATE: 78 BPM | HEIGHT: 63 IN | TEMPERATURE: 97 F | OXYGEN SATURATION: 97 % | DIASTOLIC BLOOD PRESSURE: 70 MMHG | SYSTOLIC BLOOD PRESSURE: 130 MMHG | BODY MASS INDEX: 38.39 KG/M2

## 2022-08-23 DIAGNOSIS — K11.8 PAIN OF SUBMANDIBULAR GLAND: ICD-10-CM

## 2022-08-23 DIAGNOSIS — E04.1 LEFT THYROID NODULE: Primary | ICD-10-CM

## 2022-08-23 DIAGNOSIS — I10 ESSENTIAL HYPERTENSION: ICD-10-CM

## 2022-08-23 PROCEDURE — 1123F ACP DISCUSS/DSCN MKR DOCD: CPT | Performed by: INTERNAL MEDICINE

## 2022-08-23 PROCEDURE — 99213 OFFICE O/P EST LOW 20 MIN: CPT | Performed by: INTERNAL MEDICINE

## 2022-08-23 NOTE — PROGRESS NOTES
Chief Complaint   Patient presents with    Follow-up     From July 2022  states she had US thyroid and ct scan neck  1. States she is in contant discomfort in thyroid area and would like to know what is going on with her tests      Other     Care gaps-- wants to wait till diagnostics are done before scheduling AWV       HPI:  Patient is here for follow-up   Patient still complains of left submandibular pain radiating to the left jaw worse at night  CT scan of the soft tissue showed left thyroid nodule ultrasound of the thyroid was recommended and the done showed 29 mm nodule in left thyroid lobe meets the criteria for fine-needle biopsy. Past Medical History, Surgical History, and Family History has been reviewed and updated. Review of Systems:  Constitutional:  No fever, no fatigue, no chills, no headaches, no weight change  Dermatology:  No rash, no mole, no dry or sensitive skin  ENT:  No cough, no sore throat, no sinus pain, no runny nose, no ear pain  Cardiology:  No chest pain, no palpitations, no leg edema, no shortness of breath, no PND  Gastroenterology:  No dysphagia, no abdominal pain, no nausea, no vomiting, no constipation, no diarrhea, no heartburn  Musculoskeletal:  No joint pain, no leg cramps, no back pain, no muscle aches  Respiratory:  No shortness of breath, no orthopnea, no wheezing, no PINTO, no hemoptysis  Urology:  No blood in the urine, no urinary frequency, no urinary incontinence, no urinary urgency, no nocturia, no dysuria    Vitals:    08/23/22 1449   BP: 130/70   Site: Right Upper Arm   Position: Sitting   Cuff Size: Large Adult   Pulse: 78   Temp: 97 °F (36.1 °C)   SpO2: 97%   Weight: 216 lb 11.2 oz (98.3 kg)   Height: 5' 3\" (1.6 m)       General:  Patient alert and oriented x 3, NAD, pleasant  HEENT:  Atraumatic, normocephalic, PERRLA, EOMI, clear conjunctiva, TMs clear, nose-clear, throat - no erythema  Neck:  Supple, no goiter, no carotid . no palpable thyroid nodule , left submandibular tenderness , no bruits, no LAD  Lungs:  CTA   Heart:  RRR, no murmurs, gallops or rubs  Abdomen:  Soft/nt/nd, + bowel sounds  Extremities:  No clubbing, cyanosis or edema  Skin: unremarkable    Hemoglobin A1C   Date Value Ref Range Status   05/27/2020 6.0 % Final     Cholesterol, Total   Date Value Ref Range Status   08/19/2022 158 0 - 199 mg/dL Final     Triglycerides   Date Value Ref Range Status   08/19/2022 132 0 - 149 mg/dL Final     HDL   Date Value Ref Range Status   08/19/2022 44 >40 mg/dL Final     LDL Calculated   Date Value Ref Range Status   08/19/2022 88 0 - 99 mg/dL Final     VLDL Cholesterol Calculated   Date Value Ref Range Status   08/19/2022 26 mg/dL Final     Sodium   Date Value Ref Range Status   08/19/2022 143 132 - 146 mmol/L Final     Potassium   Date Value Ref Range Status   08/19/2022 4.2 3.5 - 5.0 mmol/L Final     Chloride   Date Value Ref Range Status   08/19/2022 105 98 - 107 mmol/L Final     CO2   Date Value Ref Range Status   08/19/2022 25 22 - 29 mmol/L Final     BUN   Date Value Ref Range Status   08/19/2022 17 6 - 23 mg/dL Final     Creatinine   Date Value Ref Range Status   08/19/2022 0.8 0.5 - 1.0 mg/dL Final     Glucose   Date Value Ref Range Status   08/19/2022 97 74 - 99 mg/dL Final     Calcium   Date Value Ref Range Status   08/19/2022 10.8 (H) 8.6 - 10.2 mg/dL Final     Total Protein   Date Value Ref Range Status   08/19/2022 7.4 6.4 - 8.3 g/dL Final     Albumin   Date Value Ref Range Status   08/19/2022 4.4 3.5 - 5.2 g/dL Final     Total Bilirubin   Date Value Ref Range Status   08/19/2022 0.3 0.0 - 1.2 mg/dL Final     Alkaline Phosphatase   Date Value Ref Range Status   08/19/2022 62 35 - 104 U/L Final     AST   Date Value Ref Range Status   08/19/2022 19 0 - 31 U/L Final     ALT   Date Value Ref Range Status   08/19/2022 16 0 - 32 U/L Final     GFR Non-   Date Value Ref Range Status   08/19/2022 >60 >=60 mL/min/1.73 Final     Comment: Chronic Kidney Disease: less than 60 ml/min/1.73 sq.m. Kidney Failure: less than 15 ml/min/1.73 sq.m. Results valid for patients 18 years and older. GFR    Date Value Ref Range Status   08/19/2022 >60  Final        CT SOFT TISSUE NECK WO CONTRAST    Result Date: 7/31/2022  EXAMINATION: CT OF THE NECK WITHOUT CONTRAST  7/29/2022 TECHNIQUE: CT of the neck was performed without the administration of intravenous contrast. Multiplanar reformatted images are provided for review. Automated exposure control, iterative reconstruction, and/or weight based adjustment of the mA/kV was utilized to reduce the radiation dose to as low as reasonably achievable. COMPARISON: None. HISTORY: ORDERING SYSTEM PROVIDED HISTORY: Localized swelling, mass and lump, neck TECHNOLOGIST PROVIDED HISTORY: Reason for exam:->left cervical lump FINDINGS: PHARYNX/LARYNX:  The palatine tonsils are normal in appearance. The tongue is normal in appearance. The valleculae, epiglottis, aryepiglottic folds and pyriform sinuses appear unremarkable. The true and false vocal cords are normal in appearance. No mass or abscess is seen. SALIVARY GLANDS/THYROID:  15 mm left thyroid nodule. Unremarkable submandibular and parotid glands bilaterally. LYMPH NODES:  No cervical or supraclavicular lymphadenopathy is seen. SOFT TISSUES:  Atherosclerotic disease. BRAIN/ORBITS/SINUSES:  The visualized portion of the intracranial contents appear unremarkable. The visualized portion of the orbits, paranasal sinuses and mastoid air cells demonstrate no acute abnormality. LUNG APICES/SUPERIOR MEDIASTINUM:  No focal consolidation is seen within the visualized lung apices. No superior mediastinal lymphadenopathy or mass. The visualized portion of the trachea appears unremarkable. BONES:  Degenerative changes of the spine. Partially imaged changes of median sternotomy. Dental disease.      No acute abnormality of the soft tissue structures of the neck. Specifically, there is no evidence of soft tissue edema or lymphadenopathy. 15 mm left thyroid nodule; see recommendation below. Other incidental findings as above. RECOMMENDATIONS: 1.5 cm incidental left thyroid nodule. Recommend thyroid US. Reference: J Am Ozzie Radiol. 2015 Feb;12(2): 143-50     US THYROID    Result Date: 8/18/2022  EXAMINATION: THYROID ULTRASOUND 8/18/2022 COMPARISON: None. HISTORY: ORDERING SYSTEM PROVIDED HISTORY: Thyroid nodule TECHNOLOGIST PROVIDED HISTORY: Reason for exam:->15 MM LEFT THYROID LOBE NODULE SEEN ON CT What reading provider will be dictating this exam?->CRC FINDINGS: Right thyroid lobe:  3.6 x 1.3 x 1.2 cm Left thyroid lobe:  4.5 x 1.8 x 2.0 cm Isthmus:  3 mm Thyroid Gland:  Thyroid gland demonstrates normal echotexture and vascularity. Nodules: Dominant nodule on the left. NODULE: Left 1 Size: 29 x 15 x 17 mm Location: Left mid to lower pole 1. Composition:  Solid (2) 2. Echogenicity:  Isoechoic (1) 3. Shape: Wider-than-tall (0) 4. Margins:  Ill-defined (0) 5. Echogenic foci:  None (0) ACR TI-RADS total points:  3 ACR TI-RADS risk category: TR 3 Prior biopsy: No. Cervical lymphadenopathy: No abnormal lymph nodes in the imaged portions of the neck. 1.  Dominant nodule on the left measures 29 mm. This is classified as a TR 3 nodule which currently meet sonographic criteria to recommend FNA. 2.  Homogeneous appearance of the remaining thyroid parenchyma. Normal vascularity.  ACR TI-RADS recommendations: TR5 (>= 7 points):  FNA if >= 1 cm; follow-up if 0.5-0.9 cm in 1, 2, 3, 4, and 5 years TR4 (4-6 points):  FNA if >= 1.5 cm; follow-up if 1.0-1.4 cm in 1, 2, 3, and 5 years TR3 (3 points):  FNA if >= 2.5 cm; follow-up if 1.5-2.4 cm in 1, 3, and 5 years TR2 (2 points):  No FNA or follow-up TR1 (0 points):  No FNA or follow-up ACR TI-RADS recommends that no more than two nodules with the highest ACR TI-RADS point total should be biopsied and no more than four nodules should be followed. RECOMMENDATIONS: Recommend FNA of the dominant left thyroid nodule. Assessment/Plan:    Left thyroid nodule. Fine-needle biopsy ordered. TSH and free T4 ESR  Left submandibular pain. ENT consult  Hypertension controlled  Artery disease controlled    Outpatient Encounter Medications as of 8/23/2022   Medication Sig Dispense Refill    pantoprazole (PROTONIX) 40 MG tablet Take 1 tablet by mouth daily (Patient taking differently: Take 40 mg by mouth daily am) 90 tablet 0    montelukast (SINGULAIR) 10 MG tablet Take 1 tablet by mouth daily 90 tablet 0    metoprolol succinate (TOPROL XL) 50 MG extended release tablet Take 1 tablet by mouth 2 times daily 180 tablet 0    isosorbide mononitrate (IMDUR) 30 MG extended release tablet Take 1 tablet by mouth daily am 90 tablet 0    loratadine (CLARITIN) 10 MG tablet Take 1 tablet by mouth daily 90 tablet 0    furosemide (LASIX) 20 MG tablet Take 1 tablet by mouth daily 90 tablet 0    Cholecalciferol (VITAMIN D3) 125 MCG (5000 UT) TABS Take 1 tablet by mouth daily 90 tablet 0    diclofenac sodium (VOLTAREN) 1 % GEL Apply 4 g topically 2 times daily (Patient taking differently: Apply 4 g topically 2 times daily as needed) 100 g 1    aspirin 81 MG EC tablet Take 325 mg by mouth daily Follow Dr. Jessie Pettit anticoagulation orders      acetaminophen (TYLENOL) 650 MG extended release tablet Take 650 mg by mouth 2 times daily as needed       mometasone (NASONEX) 50 MCG/ACT nasal spray 1 spray by Each Nostril route as needed       Albuterol Sulfate (PROAIR HFA IN) Inhale 2 puffs into the lungs 4 times daily      ibuprofen (ADVIL;MOTRIN) 200 MG tablet Take 200 mg by mouth every 6 hours as needed for Pain Dr. Roman Peak anticoagulation      nitroGLYCERIN (NITROSTAT) 0.4 MG SL tablet Place 0.4 mg under the tongue every 5 minutes as needed for Chest pain up to max of 3 total doses. If no relief after 1 dose, call 911.       Evolocumab 140 MG/ML SOAJ 1 SC EVERY 2 WEEKS FOR CHOLESTEROL (Patient taking differently: 1 SC EVERY 2 WEEKS FOR CHOLESTEROL. Due  7/28/22) 2 mL 5    azelastine (ASTELIN) 0.1 % nasal spray 1 spray by Nasal route 2 times daily Use in each nostril as directed  (Patient not taking: No sig reported)       No facility-administered encounter medications on file as of 8/23/2022. Shana Aguiar was seen today for follow-up and other. Diagnoses and all orders for this visit:    Left thyroid nodule  -     RI SONO GUIDE NEEDLE BIOPSY  -     TSH; Future  -     T4, Free; Future  -     Sedimentation Rate; Future    Essential hypertension    Pain of submandibular gland  -     Sedimentation Rate; Future  -     Yessica Adame MD, Otolaryngology, [place]         There are no Patient Instructions on file for this visit.            Casper Hillman MD   8/23/22

## 2022-08-29 DIAGNOSIS — E04.1 LEFT THYROID NODULE: ICD-10-CM

## 2022-08-29 DIAGNOSIS — K11.8 PAIN OF SUBMANDIBULAR GLAND: ICD-10-CM

## 2022-08-29 LAB — SEDIMENTATION RATE, ERYTHROCYTE: 22 MM/HR (ref 0–20)

## 2022-08-30 LAB
T4 FREE: 1.07 NG/DL (ref 0.93–1.7)
TSH SERPL DL<=0.05 MIU/L-ACNC: 1.3 UIU/ML (ref 0.27–4.2)

## 2022-09-14 ENCOUNTER — HOSPITAL ENCOUNTER (OUTPATIENT)
Dept: ULTRASOUND IMAGING | Age: 82
Discharge: HOME | End: 2022-09-14
Payer: MEDICARE

## 2022-09-14 DIAGNOSIS — E04.1 LEFT THYROID NODULE: ICD-10-CM

## 2022-09-14 PROCEDURE — 88305 TISSUE EXAM BY PATHOLOGIST: CPT

## 2022-09-14 PROCEDURE — 88173 CYTOPATH EVAL FNA REPORT: CPT

## 2022-09-14 PROCEDURE — 10005 FNA BX W/US GDN 1ST LES: CPT

## 2022-09-26 ENCOUNTER — OFFICE VISIT (OUTPATIENT)
Dept: PRIMARY CARE CLINIC | Age: 82
End: 2022-09-26
Payer: MEDICARE

## 2022-09-26 VITALS
TEMPERATURE: 96.9 F | DIASTOLIC BLOOD PRESSURE: 80 MMHG | RESPIRATION RATE: 96 BRPM | HEIGHT: 63 IN | SYSTOLIC BLOOD PRESSURE: 140 MMHG | BODY MASS INDEX: 39.27 KG/M2 | HEART RATE: 62 BPM | WEIGHT: 221.6 LBS

## 2022-09-26 DIAGNOSIS — M17.11 PRIMARY OSTEOARTHRITIS OF RIGHT KNEE: Primary | ICD-10-CM

## 2022-09-26 DIAGNOSIS — I25.728 CORONARY ARTERY DISEASE OF AUTOLOGOUS BYPASS GRAFT WITH STABLE ANGINA PECTORIS (HCC): ICD-10-CM

## 2022-09-26 DIAGNOSIS — I10 PRIMARY HYPERTENSION: ICD-10-CM

## 2022-09-26 PROCEDURE — 99213 OFFICE O/P EST LOW 20 MIN: CPT | Performed by: INTERNAL MEDICINE

## 2022-09-26 PROCEDURE — 1123F ACP DISCUSS/DSCN MKR DOCD: CPT | Performed by: INTERNAL MEDICINE

## 2022-09-26 RX ORDER — PREDNISONE 10 MG/1
10 TABLET ORAL DAILY
Qty: 10 TABLET | Refills: 0 | Status: SHIPPED | OUTPATIENT
Start: 2022-09-26 | End: 2022-10-06

## 2022-09-26 SDOH — ECONOMIC STABILITY: FOOD INSECURITY: WITHIN THE PAST 12 MONTHS, THE FOOD YOU BOUGHT JUST DIDN'T LAST AND YOU DIDN'T HAVE MONEY TO GET MORE.: NEVER TRUE

## 2022-09-26 SDOH — ECONOMIC STABILITY: FOOD INSECURITY: WITHIN THE PAST 12 MONTHS, YOU WORRIED THAT YOUR FOOD WOULD RUN OUT BEFORE YOU GOT MONEY TO BUY MORE.: NEVER TRUE

## 2022-09-26 ASSESSMENT — SOCIAL DETERMINANTS OF HEALTH (SDOH): HOW HARD IS IT FOR YOU TO PAY FOR THE VERY BASICS LIKE FOOD, HOUSING, MEDICAL CARE, AND HEATING?: NOT HARD AT ALL

## 2022-09-26 NOTE — PROGRESS NOTES
Dear MD Jackie Headley MD     We had the pleasure of seeing Andrea Newell, 1940 here    on 9/26/2022  Please see below for review of care and plans. Chief complaint- No diagnosis found. History of Present Illness- New Pt here for submandibular gland/thyroid nodule. Has localised pain in leck neck and jaw assoc w/swelling. No difficulty swallowing. No change in voice. Drinks 2 glasses of water, 1 cup of coffee, and no alcohol. Weight is stable. Review of Systems- No drainage, discharge, or headache. Complete 10 system ROS completed and negative except as noted above. Physical Examination-   Vital Signs-There were no vitals taken for this visit. Ears- Tympanic membranes clear bilaterally. No middle ear effusion. No pre or post auricular tenderness. Nose- Nasal mucosa clear and dry. No significant septal deviation or inferior turbinate hypertrophy. Oral Cavity/Oropharynx- Floor of mouth and tongue are soft and nontender on right ut left with some ttp. No posterior pharyngeal erythema. + gag reflex. No stone appreciated  Neck- Soft and nontender on right. No masses, lesions, lymphadenopathy, or thyroid nodules appreciated. Cranial Nerve- Cranial nerves II to XII intact. Extraocular muscles intact. No gross motor visual deficits. No spontaneous nystagmus. Face- No facial skin tenderness to palpation. Heart- No cyanosis, regular  Lungs- No stridor, no intercostal accessory muscle use  General- The patient is in no acute distress. A&O x3      Medical Decision Making and Treatment Plan. Plan at this time to treat her sialadenitis   1. Sialagogues   2. Warm compresses   3. Massage gland and area   4. abx- not needed at this time  5.  Discussion with patient regarding sialendoscopy - not indicated unless recurrent bouts or stone is seen      Thank you for the opportunity to take part in the care of this very pleasant patient, Jamila Garner Clara  Sincerely,            Baldo Rios.  Dwaine Farias M.D., Ph.D., [address] [facility]   Department of Otolaryngology-Head and Neck Surgery  Chief of Head & Neck Surgical Oncology  Director Head & Neck Oncology Service Line

## 2022-09-26 NOTE — PROGRESS NOTES
Chief Complaint   Patient presents with    Follow-up     From August 22,  with bloodwork and bx thyroid Sept 13 2022,  list under images. 1.  would like results of thyroid bx.  2.  See's dr Chuyita Ordonez for knee and hip  on 10/5. Would like something for pain until then        Other     Care gap-  declines AWV  and declines flu vaccine at this time. HPI:  Patient is here for follow-up   Patient had thyroid nodule biopsy showed no malignant cells. Still complains of soreness in the left side of the neck and jaw  Sed rate 22. TSH and free T4 within normal limits. Patient complains of right knee and right hip pain. X-ray of the right knee showed severe tricompartmental osteoarthritis. She is scheduled to see orthopedic surgeon. Past Medical History, Surgical History, and Family History has been reviewed and updated.     Review of Systems:  Constitutional:  No fever, no fatigue, no chills, no headaches, no weight change  Dermatology:  No rash, no mole, no dry or sensitive skin  ENT:  No cough, no sore throat, no sinus pain, no runny nose, no ear pain  Cardiology:  No chest pain, no palpitations, no leg edema, no shortness of breath, no PND  Gastroenterology:  No dysphagia, no abdominal pain, no nausea, no vomiting, no constipation, no diarrhea, no heartburn  Musculoskeletal:  No joint pain, no leg cramps, no back pain, no muscle aches  Respiratory:  No shortness of breath, no orthopnea, no wheezing, no PINTO, no hemoptysis  Urology:  No blood in the urine, no urinary frequency, no urinary incontinence, no urinary urgency, no nocturia, no dysuria    Vitals:    09/26/22 1012   BP: (!) 140/80   Site: Right Upper Arm   Position: Sitting   Pulse: 62   Resp: (!) 96   Temp: 96.9 °F (36.1 °C)   Weight: 221 lb 9.6 oz (100.5 kg)   Height: 5' 3\" (1.6 m)       General:  Patient alert and oriented x 3, NAD, pleasant  HEENT:  Atraumatic, normocephalic, PERRLA, EOMI, clear conjunctiva, TMs clear, nose-clear, throat - no erythema  Neck:  Supple, no goiter, no carotid bruits, no LAD  Lungs:  CTA   Heart:  RRR, no murmurs, gallops or rubs  Abdomen:  Soft/nt/nd, + bowel sounds  Extremities:  No clubbing, cyanosis or edema.   Limited flexion and extension of the right knee positive crepitus no effusion  Skin: unremarkable    Hemoglobin A1C   Date Value Ref Range Status   05/27/2020 6.0 % Final     Cholesterol, Total   Date Value Ref Range Status   08/19/2022 158 0 - 199 mg/dL Final     Triglycerides   Date Value Ref Range Status   08/19/2022 132 0 - 149 mg/dL Final     HDL   Date Value Ref Range Status   08/19/2022 44 >40 mg/dL Final     LDL Calculated   Date Value Ref Range Status   08/19/2022 88 0 - 99 mg/dL Final     VLDL Cholesterol Calculated   Date Value Ref Range Status   08/19/2022 26 mg/dL Final     Sodium   Date Value Ref Range Status   08/19/2022 143 132 - 146 mmol/L Final     Potassium   Date Value Ref Range Status   08/19/2022 4.2 3.5 - 5.0 mmol/L Final     Chloride   Date Value Ref Range Status   08/19/2022 105 98 - 107 mmol/L Final     CO2   Date Value Ref Range Status   08/19/2022 25 22 - 29 mmol/L Final     BUN   Date Value Ref Range Status   08/19/2022 17 6 - 23 mg/dL Final     Creatinine   Date Value Ref Range Status   08/19/2022 0.8 0.5 - 1.0 mg/dL Final     Glucose   Date Value Ref Range Status   08/19/2022 97 74 - 99 mg/dL Final     Calcium   Date Value Ref Range Status   08/19/2022 10.8 (H) 8.6 - 10.2 mg/dL Final     Total Protein   Date Value Ref Range Status   08/19/2022 7.4 6.4 - 8.3 g/dL Final     Albumin   Date Value Ref Range Status   08/19/2022 4.4 3.5 - 5.2 g/dL Final     Total Bilirubin   Date Value Ref Range Status   08/19/2022 0.3 0.0 - 1.2 mg/dL Final     Alkaline Phosphatase   Date Value Ref Range Status   08/19/2022 62 35 - 104 U/L Final     AST   Date Value Ref Range Status   08/19/2022 19 0 - 31 U/L Final     ALT   Date Value Ref Range Status   08/19/2022 16 0 - 32 U/L Final     GFR Non- American   Date Value Ref Range Status   08/19/2022 >60 >=60 mL/min/1.73 Final     Comment:     Chronic Kidney Disease: less than 60 ml/min/1.73 sq.m. Kidney Failure: less than 15 ml/min/1.73 sq.m. Results valid for patients 18 years and older. GFR    Date Value Ref Range Status   08/19/2022 >60  Final        US FINE NEEDLE ASPIRATION    Result Date: 9/16/2022  PROCEDURE: ULTRASOUND GUIDED THYROID FNA 9/14/2022 HISTORY: ORDERING SYSTEM PROVIDED HISTORY: Left thyroid nodule TECHNOLOGIST PROVIDED HISTORY: Reason for exam:->29 mm nodule seen on US What reading provider will be dictating this exam?->CRC TECHNIQUE: Informed consent was obtained after the procedure was discussed in detail including the risk, benefits, and alternatives. Universal protocol was followed. The neck was prepped and draped in sterile fashion and local anesthesia was achieved with lidocaine. Ultrasound images of the neck were obtained. Note is made of a 2.7 x 1.6 cm heterogeneous nodule within the left thyroid lobe. A 25 gauge needle was advanced under ultrasound guidance into a left thyroid nodule and fine-needle aspiration was performed. 3 passes were performed and the patient tolerated the procedure well. FINDINGS: Ultrasound images demonstrate a 1.6 x 2.7 cm heterogeneous nodule within the left thyroid lobe. Successful ultrasound-guided fine-needle aspirate biopsy of the heterogeneous left thyroid nodule.         Assessment/Plan:    Thyroid nodule benign negative biopsy  Right knee and right hip osteoarthritis  Hypertension  Coronary artery disease    Outpatient Encounter Medications as of 9/26/2022   Medication Sig Dispense Refill    predniSONE (DELTASONE) 10 MG tablet Take 1 tablet by mouth daily for 10 days 10 tablet 0    pantoprazole (PROTONIX) 40 MG tablet Take 1 tablet by mouth daily (Patient taking differently: Take 40 mg by mouth daily am) 90 tablet 0    montelukast (SINGULAIR) 10 MG tablet Take 1 tablet by mouth daily 90 tablet 0    metoprolol succinate (TOPROL XL) 50 MG extended release tablet Take 1 tablet by mouth 2 times daily 180 tablet 0    isosorbide mononitrate (IMDUR) 30 MG extended release tablet Take 1 tablet by mouth daily am 90 tablet 0    loratadine (CLARITIN) 10 MG tablet Take 1 tablet by mouth daily 90 tablet 0    furosemide (LASIX) 20 MG tablet Take 1 tablet by mouth daily 90 tablet 0    Evolocumab 140 MG/ML SOAJ 1 SC EVERY 2 WEEKS FOR CHOLESTEROL (Patient taking differently: 1 SC EVERY 2 WEEKS FOR CHOLESTEROL. Due  7/28/22) 2 mL 5    Cholecalciferol (VITAMIN D3) 125 MCG (5000 UT) TABS Take 1 tablet by mouth daily 90 tablet 0    diclofenac sodium (VOLTAREN) 1 % GEL Apply 4 g topically 2 times daily (Patient taking differently: Apply 4 g topically 2 times daily as needed) 100 g 1    aspirin 81 MG EC tablet Take 325 mg by mouth daily Follow Dr. Vamsi Ruiz anticoagulation orders      acetaminophen (TYLENOL) 650 MG extended release tablet Take 650 mg by mouth 2 times daily as needed       mometasone (NASONEX) 50 MCG/ACT nasal spray 1 spray by Each Nostril route as needed       Albuterol Sulfate (PROAIR HFA IN) Inhale 2 puffs into the lungs 4 times daily      ibuprofen (ADVIL;MOTRIN) 200 MG tablet Take 200 mg by mouth every 6 hours as needed for Pain Dr. Vamsi Ruiz anticoagulation      nitroGLYCERIN (NITROSTAT) 0.4 MG SL tablet Place 0.4 mg under the tongue every 5 minutes as needed for Chest pain up to max of 3 total doses. If no relief after 1 dose, call 911.      azelastine (ASTELIN) 0.1 % nasal spray 1 spray by Nasal route 2 times daily Use in each nostril as directed  (Patient not taking: No sig reported)       No facility-administered encounter medications on file as of 9/26/2022. Ramin Miranda was seen today for follow-up and other. 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            Teresajames Landaverde MD   9/26/22

## 2022-09-27 ENCOUNTER — OFFICE VISIT (OUTPATIENT)
Dept: ENT CLINIC | Age: 82
End: 2022-09-27
Payer: MEDICARE

## 2022-09-27 VITALS — WEIGHT: 219 LBS | BODY MASS INDEX: 38.8 KG/M2 | HEIGHT: 63 IN

## 2022-09-27 DIAGNOSIS — K11.20 SIALADENITIS: Primary | ICD-10-CM

## 2022-09-27 PROCEDURE — 99203 OFFICE O/P NEW LOW 30 MIN: CPT | Performed by: OTOLARYNGOLOGY

## 2022-09-27 PROCEDURE — 1123F ACP DISCUSS/DSCN MKR DOCD: CPT | Performed by: OTOLARYNGOLOGY

## 2022-09-27 NOTE — PATIENT INSTRUCTIONS
You should drink at least 64oz of water per day. Rule of two    - Take two fingers and massage site in a circular motion for two minutes. - Do it twice a day.

## 2022-10-05 ENCOUNTER — OFFICE VISIT (OUTPATIENT)
Dept: ORTHOPEDIC SURGERY | Age: 82
End: 2022-10-05
Payer: MEDICARE

## 2022-10-05 VITALS — HEIGHT: 63 IN | WEIGHT: 219 LBS | BODY MASS INDEX: 38.8 KG/M2

## 2022-10-05 DIAGNOSIS — M25.562 ARTHRALGIA OF BOTH KNEES: ICD-10-CM

## 2022-10-05 DIAGNOSIS — G89.29 CHRONIC RIGHT SACROILIAC PAIN: Primary | ICD-10-CM

## 2022-10-05 DIAGNOSIS — M25.561 ARTHRALGIA OF BOTH KNEES: ICD-10-CM

## 2022-10-05 DIAGNOSIS — M53.3 CHRONIC RIGHT SACROILIAC PAIN: ICD-10-CM

## 2022-10-05 DIAGNOSIS — M25.551 ACUTE RIGHT HIP PAIN: ICD-10-CM

## 2022-10-05 DIAGNOSIS — G89.29 CHRONIC RIGHT SACROILIAC PAIN: ICD-10-CM

## 2022-10-05 DIAGNOSIS — M25.561 CHRONIC PAIN OF RIGHT KNEE: Primary | ICD-10-CM

## 2022-10-05 DIAGNOSIS — G89.29 CHRONIC PAIN OF RIGHT KNEE: Primary | ICD-10-CM

## 2022-10-05 DIAGNOSIS — M53.3 CHRONIC RIGHT SACROILIAC PAIN: Primary | ICD-10-CM

## 2022-10-05 PROCEDURE — 99203 OFFICE O/P NEW LOW 30 MIN: CPT | Performed by: ORTHOPAEDIC SURGERY

## 2022-10-05 PROCEDURE — 1123F ACP DISCUSS/DSCN MKR DOCD: CPT | Performed by: ORTHOPAEDIC SURGERY

## 2022-10-05 NOTE — PROGRESS NOTES
Rosie Fraser is a [de-identified] y.o. female, who presents   Chief Complaint   Patient presents with    Knee Pain     Both knees/right knee pn level 8/left knee 5/6 pn level     Hip Pain     Right hip pn/pn level 9        HPI[de-identified] Reva Stafford sees us today complaining of right hip and knee pains. She has not bilateral knee pain to right worse than left. She had makes it very clear that she wants no surgical treatment. She says that her hip area bothers her when she gets out of bed or when she stands at the counter at the sink very long. When asked she indicates the sacroiliac area. The knees hurt in general.  She is using a 4 pronged cane as an ambulatory aid. Allergies; medications; past medical, surgical, family, and social history; and problem list have been reviewed today and updated as indicated in this encounter - see below following Ortho specifics. Musculoskeletal: Gait is waddling type gait at a slow pace. Range of motion of both hips is good with no indicated pain with motion. There is no instability or crepitus. The knees are tender to palpation with range of motion of 0 to about 95 degrees. There are some crepitus throughout the range of motion. There is medial gapping of both knees with tenderness to palpation to medial joint line. The knees are not hot or red. There is little effusion this morning. Her calves are supple with no tenderness. Radiologic Studies: Imaging of both knees 4/21/2022 nonweightbearing shows varus malalignment with medial bone-on-bone contact and hypertrophic marginal changes diffusely. The right is worse overall than the left    ASSESSMENT:  Reva Stafford was seen today for knee pain and hip pain.     Diagnoses and all orders for this visit:    Chronic pain of right knee    Acute right hip pain    Arthralgia of both knees    Chronic right sacroiliac pain     Treatment alternatives were reviewed including medical and physical therapies, injections, and surgical options, expected risks benefits and likely outcome of each were discussed in detail, questions asked and answered and understood. We discussed the symptom as well as physical findings and imaging results. There appears to be no hip joint problems at present. Her symptoms seem to be more sacroiliac in nature. There is degeneration of both knees. We discussed treatment including injections which she seem to be focused on. We will seek approval for [employer] supplement injection at least of the right knee. We will also refer her to physical medicine rehab for evaluation of the sacroiliac joints to see if injection or other treatments indicated. PLAN: Discussed supplement injection right knee upon insurance approval  Physical medicine and rehab referral for sacroiliac pain.       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 Aida Pinzon last seen 1/3/22    GERD (gastroesophageal reflux disease)     Hyperlipidemia     Hypertension     Sinus infection 06/22/2022    LD of doxycline- 7/2/22       Past Surgical History:   Procedure Laterality Date    APPENDECTOMY      APPENDECTOMY      BLADDER SURGERY      BLADDER SUSPENSION      CARDIAC SURGERY      CATARACT EXTRACTION Left 07/01/2022    CHOLECYSTECTOMY      CHOLECYSTECTOMY      COLONOSCOPY  11/29/2012    diverticulosis, hemorrhoid    COLONOSCOPY  09/2015    CORONARY ANGIOPLASTY  10/11/2019    Angioplasty LAD by Dr Pam Brady  06/17/2019 Bypass x4. [facility].  LIMB-LAD, SVG-OM1 SVG-OM2 SVG-RCA; Dr. Shyann Winter- last seen 1/3/22    CYST REMOVAL      back    DILATION AND CURETTAGE OF UTERUS      HYSTERECTOMY (CERVIX STATUS UNKNOWN)      INTRACAPSULAR CATARACT EXTRACTION Left 07/13/2022    CATARACT EXTRACTION WITH INTRAOCULAR LENS IMPLANT LEFT EYE performed by Jessica Moreira DO at [address] [place] [address]       Current Outpatient Medications   Medication Sig Dispense Refill    predniSONE (DELTASONE) 10 MG tablet Take 1 tablet by mouth daily for 10 days 10 tablet 0    pantoprazole (PROTONIX) 40 MG tablet Take 1 tablet by mouth daily (Patient taking differently: Take 40 mg by mouth daily am) 90 tablet 0    montelukast (SINGULAIR) 10 MG tablet Take 1 tablet by mouth daily 90 tablet 0    metoprolol succinate (TOPROL XL) 50 MG extended release tablet Take 1 tablet by mouth 2 times daily 180 tablet 0    isosorbide mononitrate (IMDUR) 30 MG extended release tablet Take 1 tablet by mouth daily am 90 tablet 0    loratadine (CLARITIN) 10 MG tablet Take 1 tablet by mouth daily 90 tablet 0    furosemide (LASIX) 20 MG tablet Take 1 tablet by mouth daily 90 tablet 0    Evolocumab 140 MG/ML SOAJ 1 SC EVERY 2 WEEKS FOR CHOLESTEROL (Patient taking differently: 1 SC EVERY 2 WEEKS FOR CHOLESTEROL.  Due  7/28/22) 2 mL 5    Cholecalciferol (VITAMIN D3) 125 MCG (5000 UT) TABS Take 1 tablet by mouth daily 90 tablet 0    diclofenac sodium (VOLTAREN) 1 % GEL Apply 4 g topically 2 times daily (Patient taking differently: Apply 4 g topically 2 times daily as needed) 100 g 1    aspirin 81 MG EC tablet Take 325 mg by mouth daily Follow Dr. Sakina Coyne anticoagulation orders      acetaminophen (TYLENOL) 650 MG extended release tablet Take 650 mg by mouth 2 times daily as needed       mometasone (NASONEX) 50 MCG/ACT nasal spray 1 spray by Each Nostril route as needed       Albuterol Sulfate (PROAIR HFA IN) Inhale 2 puffs into the lungs 4 times daily      ibuprofen (ADVIL;MOTRIN) 200 MG tablet Take 200 mg by mouth every 6 hours as needed for Pain Dr. Morgan Perea anticoagulation      nitroGLYCERIN (NITROSTAT) 0.4 MG SL tablet Place 0.4 mg under the tongue every 5 minutes as needed for Chest pain up to max of 3 total doses. If no relief after 1 dose, call 911.      azelastine (ASTELIN) 0.1 % nasal spray 1 spray by Nasal route 2 times daily Use in each nostril as directed  (Patient not taking: No sig reported)       No current facility-administered medications for this visit. Allergies   Allergen Reactions    Statins [Statins] Swelling and Rash     Muscle aches tongue swelled    Adhesive Tape      Causes reddness    Duricef [Cefadroxil]      Doesn't remember reaction    Gabapentin      Doesn't remember reaction    Oxaprozin      Possible rash    Sulfa Antibiotics Rash       Social History     Socioeconomic History    Marital status:      Spouse name: None    Number of children: None    Years of education: None    Highest education level: None   Tobacco Use    Smoking status: Former     Packs/day: 1.00     Years: 37.00     Pack years: 37.00     Types: Cigarettes     Start date:      Quit date:      Years since quittin.7    Smokeless tobacco: Never   Vaping Use    Vaping Use: Never used   Substance and Sexual Activity    Alcohol use: No    Drug use: No     Social Determinants of Health     Financial Resource Strain: Low Risk     Difficulty of Paying Living Expenses: Not hard at all   Food Insecurity: No Food Insecurity    Worried About Running Out of Food in the Last Year: Never true    Ran Out of Food in the Last Year: Never true       Family History   Problem Relation Age of Onset    Cancer Mother     Stroke Father          Review of Systems:   As follows except as previously noted in HPI:  Constitutional: Negative for chills, diaphoresis,  fever   Respiratory: Negative for cough, shortness of breath and wheezing. Cardiovascular: Negative for chest pain and palpitations. Neurological: Negative for dizziness, syncope,   GI / : abdominal pain or cramping  Musculoskeletal: see HPI       Objective:   Physical Exam   Constitutional: Oriented to person, place, and time. and appears well-developed and well-nourished. :   Head: Normocephalic and atraumatic. Neck: Neck supple. Eyes: EOM are normal.   Pulmonary/Chest: Effort normal.  No respiratory distress, no wheezes. Neurological: Alert and oriented to person  Skin: Skin is warm and dry. Alen Espinal DO    10/5/22  10:57 AM    All reasonable efforts have been made to minimize the risk of errors that may occur in the use of voice recognition and other electronic means of charting.

## 2022-10-14 ENCOUNTER — OFFICE VISIT (OUTPATIENT)
Dept: ORTHOPEDIC SURGERY | Age: 82
End: 2022-10-14
Payer: MEDICARE

## 2022-10-14 DIAGNOSIS — G89.29 CHRONIC PAIN OF RIGHT KNEE: Primary | ICD-10-CM

## 2022-10-14 DIAGNOSIS — M25.561 CHRONIC PAIN OF RIGHT KNEE: Primary | ICD-10-CM

## 2022-10-14 DIAGNOSIS — M17.11 PRIMARY OSTEOARTHRITIS OF RIGHT KNEE: ICD-10-CM

## 2022-10-14 PROCEDURE — 20610 DRAIN/INJ JOINT/BURSA W/O US: CPT | Performed by: ORTHOPAEDIC SURGERY

## 2022-10-14 RX ORDER — HYALURONATE SODIUM 10 MG/ML
20 SYRINGE (ML) INTRAARTICULAR ONCE
Status: COMPLETED | OUTPATIENT
Start: 2022-10-14 | End: 2022-10-14

## 2022-10-14 RX ADMIN — Medication 20 MG: at 10:28

## 2022-10-14 NOTE — PROGRESS NOTES
Chief Complaint:   Chief Complaint   Patient presents with    Knee Pain     Right Knee Euflexxa #1       Kathi Elizabeth follows up for injection of her right knee. She has had chronic pain in the knee. She is known to have significant degenerative arthrosis in the knee and is here for Euflexxa injections. No signs of acute inflammation and no redness or swelling in the knee. Allergies; medications; past medical, surgical, family, and social history; and problem list have been reviewed today and updated as indicated in this encounter seen below. Exam: Skin condition gross neurovascular functions good in right lower extremity. There is no signs of acute inflammation and no contraindications to injection. Radiographs: Multiple views of the right knee show advanced degeneration with varus malalignment and narrowing of the medial joint space. ASSESSMENT:    Rukhsana Slater was seen today for knee pain. Diagnoses and all orders for this visit:    Chronic pain of right knee    Primary osteoarthritis of right knee  -     [place] ARTHROCENTESIS ASPIR&/INJ MAJOR JT/BURSA W/O US    Other orders  -     sodium hyaluronate (EUFLEXXA, HYALGAN) injection 20 mg        PLAN: Aspiration and Injectionof the right knee with Euflexxa                                   20mgwas discussed with the patient. Discussion included but was not limited to potential risks and benefits. No contraindications to the procedure were noted. Understanding and agreement was indicated. The procedure was accomplished without incident using appropriate aseptic technique. Recovered was: 1 cc clear and yellow joint fluid which was discarded  follow up for 2nd injection  Lot number [id number]      Return in about 1 year (around 10/14/2023).        Current Outpatient Medications   Medication Sig Dispense Refill    pantoprazole (PROTONIX) 40 MG tablet Take 1 tablet by mouth daily (Patient taking differently: Take 40 mg by mouth daily am) 90 tablet 0 montelukast (SINGULAIR) 10 MG tablet Take 1 tablet by mouth daily 90 tablet 0    metoprolol succinate (TOPROL XL) 50 MG extended release tablet Take 1 tablet by mouth 2 times daily 180 tablet 0    isosorbide mononitrate (IMDUR) 30 MG extended release tablet Take 1 tablet by mouth daily am 90 tablet 0    loratadine (CLARITIN) 10 MG tablet Take 1 tablet by mouth daily 90 tablet 0    furosemide (LASIX) 20 MG tablet Take 1 tablet by mouth daily 90 tablet 0    Evolocumab 140 MG/ML SOAJ 1 SC EVERY 2 WEEKS FOR CHOLESTEROL (Patient taking differently: 1 SC EVERY 2 WEEKS FOR CHOLESTEROL. Due  7/28/22) 2 mL 5    Cholecalciferol (VITAMIN D3) 125 MCG (5000 UT) TABS Take 1 tablet by mouth daily 90 tablet 0    diclofenac sodium (VOLTAREN) 1 % GEL Apply 4 g topically 2 times daily (Patient taking differently: Apply 4 g topically 2 times daily as needed) 100 g 1    aspirin 81 MG EC tablet Take 325 mg by mouth daily Follow Dr. Solomon Pisano anticoagulation orders      acetaminophen (TYLENOL) 650 MG extended release tablet Take 650 mg by mouth 2 times daily as needed       mometasone (NASONEX) 50 MCG/ACT nasal spray 1 spray by Each Nostril route as needed       Albuterol Sulfate (PROAIR HFA IN) Inhale 2 puffs into the lungs 4 times daily      azelastine (ASTELIN) 0.1 % nasal spray 1 spray by Nasal route 2 times daily Use in each nostril as directed  (Patient not taking: No sig reported)      ibuprofen (ADVIL;MOTRIN) 200 MG tablet Take 200 mg by mouth every 6 hours as needed for Pain Dr. Solomon Pisano anticoagulation      nitroGLYCERIN (NITROSTAT) 0.4 MG SL tablet Place 0.4 mg under the tongue every 5 minutes as needed for Chest pain up to max of 3 total doses. If no relief after 1 dose, call 911. No current facility-administered medications for this visit.        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 Chapis Dick last seen 1/3/22    GERD (gastroesophageal reflux disease)     Hyperlipidemia     Hypertension     Sinus infection 06/22/2022    LD of doxycline- 7/2/22       Past Surgical History:   Procedure Laterality Date    APPENDECTOMY      APPENDECTOMY      BLADDER SURGERY      BLADDER SUSPENSION      CARDIAC SURGERY      CATARACT EXTRACTION Left 07/01/2022    CHOLECYSTECTOMY      CHOLECYSTECTOMY      COLONOSCOPY  11/29/2012    diverticulosis, hemorrhoid    COLONOSCOPY  09/2015    CORONARY ANGIOPLASTY  10/11/2019    Angioplasty LAD by Dr Myles Levo  06/17/2019    Bypass x4. [facility].  LIMB-LAD, SVG-OM1 SVG-OM2 SVG-RCA; Dr. Chapis Dick- last seen 1/3/22    CYST REMOVAL      back    DILATION AND CURETTAGE OF UTERUS      HYSTERECTOMY (CERVIX STATUS UNKNOWN)      INTRACAPSULAR CATARACT EXTRACTION Left 07/13/2022    CATARACT EXTRACTION WITH INTRAOCULAR LENS IMPLANT LEFT EYE performed by Amalia Harrell DO at [address]   Allergen Reactions    Statins [Statins] Swelling and Rash     Muscle aches tongue swelled    Adhesive Tape      Causes reddness    Duricef [Cefadroxil]      Doesn't remember reaction    Gabapentin      Doesn't remember reaction    Oxaprozin      Possible rash    Sulfa Antibiotics Rash       Social History     Socioeconomic History    Marital status:       Spouse name: None    Number of children: None    Years of education: None    Highest education level: None   Tobacco Use    Smoking status: Former     Packs/day: 1.00     Years: 37.00     Pack years: 37.00     Types: Cigarettes     Start date: 57     Quit date: 47     Years since quittin.8    Smokeless tobacco: Never   Vaping Use    Vaping Use: Never used   Substance and Sexual Activity    Alcohol use: No    Drug use: No     Social Determinants of Health     Financial Resource Strain: Low Risk     Difficulty of Paying Living Expenses: Not hard at all   Food Insecurity: No Food Insecurity    Worried About Running Out of Food in the Last Year: Never true    [address] [facility] [address] [facility] in the Last Year: Never true       Review of Systems  As follows except as previously noted in HPI:  Constitutional: Negative for chills, diaphoresis, fatigue, fever and unexpected weight change. Respiratory: Negative for cough, shortness of breath and wheezing. Cardiovascular: Negative for chest pain and palpitations. Neurological: Negative for dizziness, syncope, cephalgia. GI / : negative  Musculoskeletal: see HPI       Objective:   Physical Exam   Constitutional: Oriented to person, place, and time. and appears well-developed and well-nourished. :   Head: Normocephalic and atraumatic. Eyes: EOM are normal.   Neck: Neck supple. Cardiovascular: Normal rate and regular rhythm. Pulmonary/Chest: Effort normal. No stridor. No respiratory distress, no wheezes. Abdominal:  No abnormal distension. Neurological: Alert and oriented to person, place, and time. Skin: Skin is warm and dry. Psychiatric: Normal mood and affect.  Behavior is normal. Thought content normal.    HARLAN Brar DO    10/14/22  10:35 AM

## 2022-10-18 ENCOUNTER — OFFICE VISIT (OUTPATIENT)
Dept: PHYSICAL MEDICINE AND REHAB | Age: 82
End: 2022-10-18
Payer: MEDICARE

## 2022-10-18 VITALS
BODY MASS INDEX: 39.87 KG/M2 | HEART RATE: 85 BPM | HEIGHT: 63 IN | SYSTOLIC BLOOD PRESSURE: 142 MMHG | DIASTOLIC BLOOD PRESSURE: 80 MMHG | WEIGHT: 225 LBS

## 2022-10-18 DIAGNOSIS — M47.816 LUMBAR SPONDYLOSIS: ICD-10-CM

## 2022-10-18 DIAGNOSIS — M53.3 SACROILIAC JOINT DYSFUNCTION OF RIGHT SIDE: ICD-10-CM

## 2022-10-18 DIAGNOSIS — M54.50 CHRONIC RIGHT-SIDED LOW BACK PAIN WITHOUT SCIATICA: ICD-10-CM

## 2022-10-18 DIAGNOSIS — G89.29 CHRONIC RIGHT-SIDED LOW BACK PAIN WITHOUT SCIATICA: ICD-10-CM

## 2022-10-18 DIAGNOSIS — M53.3 SACROILIAC JOINT PAIN: Primary | ICD-10-CM

## 2022-10-18 PROCEDURE — 1123F ACP DISCUSS/DSCN MKR DOCD: CPT | Performed by: PHYSICAL MEDICINE & REHABILITATION

## 2022-10-18 PROCEDURE — 99204 OFFICE O/P NEW MOD 45 MIN: CPT | Performed by: PHYSICAL MEDICINE & REHABILITATION

## 2022-10-18 RX ORDER — ASPIRIN 325 MG
325 TABLET ORAL DAILY
COMMUNITY

## 2022-10-18 NOTE — PROGRESS NOTES
Uvaldo Meansrojelio, [id number] [facility] Physical Medicine and Rehabilitation  [address] [facility]. [address] [facility] [place]  Phone: [phone]  Fax: [phone]    PCP: Codi Cantu MD  Date of visit: 10/18/22    Chief Complaint   Patient presents with    Lower Back Pain       Dear Dr. Matteo Doran,     Thank you for referring your patient to be seen. As you know,  Farida Rendon is a [de-identified] y.o. female with past medical history as below who presents with right sided low back pain for 2 month(s). There was a gradual onset of pain after no known injury. Now, the pain is intermittent and occurs mostly in the morning when trying to get up or in the middle of the night when she wakes up to use the restroom. She will stand and get a sharp shooting pain in the right low back about the SI joint. She will wait a few seconds and then it eases up a little bit. The pain is rated  , is described as sharp, shooting, and is located in the right low back about the SI joint without radiation to the leg. She has advanced right knee OA. Gait is antalgic. The symptoms have been unchanged since onset. The pain is better with  moving around to loosen it up . The pain is worse with  getting up after laying down for awhile . There is no associated numbness/tingling. There is no weakness. There is no bowel/bladder changes. Reports occasional cramping in thigh muscles.      The prior workup has included: Xray, MRI L spine 2020     The prior treatment has included:  PT: none   Chiropractic: none    Modalities: none    OTC Tylenol: yes with mild relief   NSAIDS: yes with relief   Opioids: none   Membrane stabilizers: none   Muscle relaxers: none    Previous injections: none in back, currently for right knee    Previous surgery at this site: none    Allergies   Allergen Reactions    Statins [Statins] Swelling and Rash     Muscle aches tongue swelled    Adhesive Tape      Causes reddness    Duricef [Cefadroxil]      Doesn't remember reaction    Gabapentin      Doesn't remember reaction    Oxaprozin      Possible rash    Sulfa Antibiotics Rash       Current Outpatient Medications   Medication Sig Dispense Refill    aspirin 325 MG tablet Take 325 mg by mouth daily      pantoprazole (PROTONIX) 40 MG tablet Take 1 tablet by mouth daily (Patient taking differently: Take 40 mg by mouth daily am) 90 tablet 0    montelukast (SINGULAIR) 10 MG tablet Take 1 tablet by mouth daily 90 tablet 0    metoprolol succinate (TOPROL XL) 50 MG extended release tablet Take 1 tablet by mouth 2 times daily 180 tablet 0    isosorbide mononitrate (IMDUR) 30 MG extended release tablet Take 1 tablet by mouth daily am 90 tablet 0    loratadine (CLARITIN) 10 MG tablet Take 1 tablet by mouth daily 90 tablet 0    furosemide (LASIX) 20 MG tablet Take 1 tablet by mouth daily 90 tablet 0    Evolocumab 140 MG/ML SOAJ 1 SC EVERY 2 WEEKS FOR CHOLESTEROL (Patient taking differently: 1 SC EVERY 2 WEEKS FOR CHOLESTEROL. Due  7/28/22) 2 mL 5    Cholecalciferol (VITAMIN D3) 125 MCG (5000 UT) TABS Take 1 tablet by mouth daily 90 tablet 0    diclofenac sodium (VOLTAREN) 1 % GEL Apply 4 g topically 2 times daily (Patient taking differently: Apply 4 g topically 2 times daily as needed) 100 g 1    acetaminophen (TYLENOL) 650 MG extended release tablet Take 650 mg by mouth 2 times daily as needed       mometasone (NASONEX) 50 MCG/ACT nasal spray 1 spray by Each Nostril route as needed       Albuterol Sulfate (PROAIR HFA IN) Inhale 2 puffs into the lungs 4 times daily      azelastine (ASTELIN) 0.1 % nasal spray 1 spray by Nasal route 2 times daily Use in each nostril as directed      ibuprofen (ADVIL;MOTRIN) 200 MG tablet Take 200 mg by mouth every 6 hours as needed for Pain Dr. Emmanuel Santos anticoagulation      nitroGLYCERIN (NITROSTAT) 0.4 MG SL tablet Place 0.4 mg under the tongue every 5 minutes as needed for Chest pain up to max of 3 total doses. If no relief after 1 dose, call 911. No current facility-administered medications for this visit. Past Medical History:   Diagnosis Date    Arthritis     shoulders, hips and knees, fingers    Bronchitis     CAD (coronary artery disease)     some blockage, being treated with aspirin; Dr. Chapis Dick last seen 1/3/22    GERD (gastroesophageal reflux disease)     Hyperlipidemia     Hypertension     Sinus infection 2022    LD of doxycline- 22       Past Surgical History:   Procedure Laterality Date    APPENDECTOMY      APPENDECTOMY      BLADDER SURGERY      BLADDER SUSPENSION      CARDIAC SURGERY      CATARACT EXTRACTION Left 2022    CHOLECYSTECTOMY      CHOLECYSTECTOMY      COLONOSCOPY  2012    diverticulosis, hemorrhoid    COLONOSCOPY  2015    CORONARY ANGIOPLASTY  10/11/2019    Angioplasty LAD by Dr Myles Levo  2019    Bypass x4. [facility].  LIMB-LAD, SVG-OM1 SVG-OM2 SVG-RCA; Dr. Chapis Dick- last seen 1/3/22    CYST REMOVAL      back    DILATION AND CURETTAGE OF UTERUS      HYSTERECTOMY (CERVIX STATUS UNKNOWN)      INTRACAPSULAR CATARACT EXTRACTION Left 2022    CATARACT EXTRACTION WITH INTRAOCULAR LENS IMPLANT LEFT EYE performed by Amalia Harrell DO at [address] [place] [address]       Family History   Problem Relation Age of Onset    Cancer Mother     Stroke Father        Social History     Tobacco Use    Smoking status: Former     Packs/day: 1.00     Years: 37.00     Pack years: 37.00     Types: Cigarettes     Start date:      Quit date:      Years since quittin.8    Smokeless tobacco: Never   Vaping Use    Vaping Use: Never used   Substance Use Topics    Alcohol use: No    Drug use: No          Functional Status: The patient is able to ambulate and perform activities of daily living with the use of an assistive device.          ROS:    Constitutional: Denies fevers, chills, night sweats, unintentional weight loss     Skin: Denies rash or skin changes     Eyes: Denies vision changes    Ears/Nose/Throat: Denies nasal congestion or sore throat     Respiratory: Denies SOB or cough     Cardiovascular: Denies CP, palpitations, edema      Gastrointestinal: Denies abdominal pain,  N/V, constipation, or diarrhea    Genitourinary: Denies urinary symptoms    Neurologic: See HPI.     MSK: See HPI. Psychiatric: Denies sleep disturbance, anxiety, depression    Hematologic/Lymphatic/Immunologic: Denies bruising       Physical Exam:   Blood pressure (!) 142/80, pulse 85, height 5' 3\" (1.6 m), weight 225 lb (102.1 kg), not currently breastfeeding. General: well developed and well nourished in no acute distress. Body habitus is obese  HEENT: No rhinorrhea, sneezing, yawning, or lacrimation. No scleral icterus or conjunctival injection. Resp: symmetrical chest expansion, unlabored breathing, respirations unlabored. CV: Heart rate is regular. Peripheral pulses are palpable  Lymph: No visible regional lymphadenopathy. Skin: No rashes or ecchymosis. Normal turgor. Psych: Mood is calm. Affect is normal.   Ext: No edema noted     MSK:   Back/Hip Exam:   Inspection: Pelvis was asymmetric. Lumbar lordotic curvature was increased. There was no scoliosis. No ecchymoses or erythema. Palpation: Palpatory exam revealed some tenderness along left lumbosacral paraspinals, midline spine, concordant ttp right SI joint sulcus, no ttp greater trochanters and TFL on both side. There was no paraspinal spasms. There were no trigger points. ROM: ROM decreased  Special/provocative testing:   SLR negative   positive FABERS. There was no leg length discrepancy upon gross inspection. Neurological Exam:  Strength:   R  L  Knee Ext  5  5  Ankle dorsi  5  5  EHL   5 5  Ankle Plantar  5  5    Sensory:  Intact for light touch in all lower extremity dermatomes.      Reflexes:   R  L  Patellar  (1+) (1+)  Ankle Jerk  (1+) (1+)      Gait is antalgic to right side     Imaging: (personally reviewed by me 10/18/22)  MRI L Spine 2020  Right knee x-ray     Impression:   Maegan Fraire is a [de-identified] y.o. female     1. Sacroiliac joint pain    2. Sacroiliac joint dysfunction of right side    3. Chronic right-sided low back pain without sciatica    4. Lumbar spondylosis        Plan:   Right knee x-ray reviewed. Lumbar MRI from 2020 was reviewed with Bryon Orosco and her daughter. There is facet arthropathy with DDD/disc bulging causing central stenosis at L3-4, NF stenosis worst left L3-4. I would recommend PT to help with mechanics, spine and pelvis stability then if no relief, we can consider SI joint injection. Referral was placed. The patient was educated about the diagnosis, prognosis, indications, risks and benefits of treatment. An opportunity to ask questions was given to the patient and questions were answered. The patient agreed to proceed with the recommended treatment as described above. Follow up in 6 weeks. Thank you for the consultation and for allowing me to participate in the care of this patient.         Sincerely,     Carmita Cruz DO, [facility]   Board Certified Physical Medicine and Rehabilitation

## 2022-10-21 ENCOUNTER — OFFICE VISIT (OUTPATIENT)
Dept: ORTHOPEDIC SURGERY | Age: 82
End: 2022-10-21
Payer: MEDICARE

## 2022-10-21 DIAGNOSIS — M17.11 PRIMARY OSTEOARTHRITIS OF RIGHT KNEE: Primary | ICD-10-CM

## 2022-10-21 PROCEDURE — 99999 PR OFFICE/OUTPT VISIT,PROCEDURE ONLY: CPT | Performed by: ORTHOPAEDIC SURGERY

## 2022-10-21 PROCEDURE — 20610 DRAIN/INJ JOINT/BURSA W/O US: CPT | Performed by: ORTHOPAEDIC SURGERY

## 2022-10-21 RX ORDER — HYALURONATE SODIUM 10 MG/ML
20 SYRINGE (ML) INTRAARTICULAR ONCE
Status: COMPLETED | OUTPATIENT
Start: 2022-10-21 | End: 2022-10-21

## 2022-10-21 RX ADMIN — Medication 20 MG: at 12:54

## 2022-10-21 NOTE — PROGRESS NOTES
Chief Complaint: No chief complaint on file. Kathi Elizabeth follows up for Euflexxa injection #2 right knee. She has had no complications from previous injection and has no signs of acute inflammation. Allergies; medications; past medical, surgical, family, and social history; and problem list have been reviewed today and updated as indicated in this encounter seen below. Exam: Skin condition gross neurovascular function is good. The knee is not hot or red and there are no contraindications to injection of the right knee. Radiographs: None    ASSESSMENT:    Diagnoses and all orders for this visit:    Primary osteoarthritis of right knee  -     [place] ARTHROCENTESIS ASPIR&/INJ MAJOR JT/BURSA W/O US    Other orders  -     sodium hyaluronate (EUFLEXXA, HYALGAN) injection 20 mg      PLAN: Aspiration and Injectionof the right knee with Euflexxa                                   20mgwas discussed with the patient. Discussion included but was not limited to potential risks and benefits. No contraindications to the procedure were noted. Understanding and agreement was indicated. The procedure was accomplished without incident using appropriate aseptic technique. Recovered was: 1 cc clear and yellow joint fluid which was discarded  follow up for 3rd injection  Lot # [id number]      Return in about 1 week (around 10/28/2022) for Injection #3 right knee.        Current Outpatient Medications   Medication Sig Dispense Refill    aspirin 325 MG tablet Take 325 mg by mouth daily      pantoprazole (PROTONIX) 40 MG tablet Take 1 tablet by mouth daily (Patient taking differently: Take 40 mg by mouth daily am) 90 tablet 0    montelukast (SINGULAIR) 10 MG tablet Take 1 tablet by mouth daily 90 tablet 0    metoprolol succinate (TOPROL XL) 50 MG extended release tablet Take 1 tablet by mouth 2 times daily 180 tablet 0    isosorbide mononitrate (IMDUR) 30 MG extended release tablet Take 1 tablet by mouth daily am 90 tablet 0    loratadine (CLARITIN) 10 MG tablet Take 1 tablet by mouth daily 90 tablet 0    furosemide (LASIX) 20 MG tablet Take 1 tablet by mouth daily 90 tablet 0    Evolocumab 140 MG/ML SOAJ 1 SC EVERY 2 WEEKS FOR CHOLESTEROL (Patient taking differently: 1 SC EVERY 2 WEEKS FOR CHOLESTEROL. Due  7/28/22) 2 mL 5    Cholecalciferol (VITAMIN D3) 125 MCG (5000 UT) TABS Take 1 tablet by mouth daily 90 tablet 0    diclofenac sodium (VOLTAREN) 1 % GEL Apply 4 g topically 2 times daily (Patient taking differently: Apply 4 g topically 2 times daily as needed) 100 g 1    acetaminophen (TYLENOL) 650 MG extended release tablet Take 650 mg by mouth 2 times daily as needed       mometasone (NASONEX) 50 MCG/ACT nasal spray 1 spray by Each Nostril route as needed       Albuterol Sulfate (PROAIR HFA IN) Inhale 2 puffs into the lungs 4 times daily      azelastine (ASTELIN) 0.1 % nasal spray 1 spray by Nasal route 2 times daily Use in each nostril as directed      ibuprofen (ADVIL;MOTRIN) 200 MG tablet Take 200 mg by mouth every 6 hours as needed for Pain Dr. Hermes Martínez anticoagulation      nitroGLYCERIN (NITROSTAT) 0.4 MG SL tablet Place 0.4 mg under the tongue every 5 minutes as needed for Chest pain up to max of 3 total doses. If no relief after 1 dose, call 911. No current facility-administered medications for this visit.        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 Stanislaw Posey last seen 1/3/22 GERD (gastroesophageal reflux disease)     Hyperlipidemia     Hypertension     Sinus infection 2022    LD of doxycline- 22       Past Surgical History:   Procedure Laterality Date    APPENDECTOMY      APPENDECTOMY      BLADDER SURGERY      BLADDER SUSPENSION      CARDIAC SURGERY      CATARACT EXTRACTION Left 2022    CHOLECYSTECTOMY      CHOLECYSTECTOMY      COLONOSCOPY  2012    diverticulosis, hemorrhoid    COLONOSCOPY  2015    CORONARY ANGIOPLASTY  10/11/2019    Angioplasty LAD by Dr Myles Levo  2019    Bypass x4. [facility].  LIMB-LAD, SVG-OM1 SVG-OM2 SVG-RCA; Dr. Chapis Dick- last seen 1/3/22    CYST REMOVAL      back    DILATION AND CURETTAGE OF UTERUS      HYSTERECTOMY (CERVIX STATUS UNKNOWN)      INTRACAPSULAR CATARACT EXTRACTION Left 2022    CATARACT EXTRACTION WITH INTRAOCULAR LENS IMPLANT LEFT EYE performed by Amalia Harrell DO at [address]   Allergen Reactions    Statins [Statins] Swelling and Rash     Muscle aches tongue swelled    Adhesive Tape      Causes reddness    Duricef [Cefadroxil]      Doesn't remember reaction    Gabapentin      Doesn't remember reaction    Oxaprozin      Possible rash    Sulfa Antibiotics Rash       Social History     Socioeconomic History    Marital status:       Spouse name: None    Number of children: None    Years of education: None    Highest education level: None   Tobacco Use    Smoking status: Former     Packs/day: 1.00     Years: 37.00     Pack years: 37.00     Types: Cigarettes     Start date:      Quit date:      Years since quittin.8    Smokeless tobacco: Never   Vaping Use    Vaping Use: Never used   Substance and Sexual Activity    Alcohol use: No    Drug use: No     Social Determinants of Health     Financial Resource Strain: Low Risk     Difficulty of Paying Living Expenses: Not hard at all   Food Insecurity: No Food Insecurity    Worried About Running Out of Food in the Last Year: Never true    Ran Out of Food in the Last Year: Never true       Review of Systems  As follows except as previously noted in HPI:  Constitutional: Negative for chills, diaphoresis, fatigue, fever and unexpected weight change. Respiratory: Negative for cough, shortness of breath and wheezing. Cardiovascular: Negative for chest pain and palpitations. Neurological: Negative for dizziness, syncope, cephalgia. GI / : negative  Musculoskeletal: see HPI       Objective:   Physical Exam   Constitutional: Oriented to person, place, and time. and appears well-developed and well-nourished. :   Head: Normocephalic and atraumatic. Eyes: EOM are normal.   Neck: Neck supple. Cardiovascular: Normal rate and regular rhythm. Pulmonary/Chest: Effort normal. No stridor. No respiratory distress, no wheezes. Abdominal:  No abnormal distension. Neurological: Alert and oriented to person, place, and time. Skin: Skin is warm and dry. Psychiatric: Normal mood and affect.  Behavior is normal. Thought content normal.    HARLAN Martinez DO    10/21/22  12:54 PM

## 2022-10-28 ENCOUNTER — OFFICE VISIT (OUTPATIENT)
Dept: ORTHOPEDIC SURGERY | Age: 82
End: 2022-10-28
Payer: MEDICARE

## 2022-10-28 DIAGNOSIS — M17.11 PRIMARY OSTEOARTHRITIS OF RIGHT KNEE: Primary | ICD-10-CM

## 2022-10-28 PROCEDURE — 99999 PR OFFICE/OUTPT VISIT,PROCEDURE ONLY: CPT | Performed by: ORTHOPAEDIC SURGERY

## 2022-10-28 PROCEDURE — 20610 DRAIN/INJ JOINT/BURSA W/O US: CPT | Performed by: ORTHOPAEDIC SURGERY

## 2022-10-28 RX ORDER — HYALURONATE SODIUM 10 MG/ML
20 SYRINGE (ML) INTRAARTICULAR ONCE
Status: COMPLETED | OUTPATIENT
Start: 2022-10-28 | End: 2022-10-28

## 2022-10-28 RX ADMIN — Medication 20 MG: at 12:13

## 2022-10-28 NOTE — PROGRESS NOTES
Chief Complaint:   Chief Complaint   Patient presents with    Knee Pain     Right Knee Euflexxa #3       Kathi Elizabeth follows for right knee Euflexxa injection #3. She is done fine with the others and says that the injections are already helping her. She has less pain overall in the right knee. Did have a little superficial bleeding from the injection site last time but with no complications. Allergies; medications; past medical, surgical, family, and social history; and problem list have been reviewed today and updated as indicated in this encounter seen below. Exam: The skin condition and gross neurovascular function are good in right lower extremity. The knee is not hot or red. There are no contraindications to injection. Radiographs: None    ASSESSMENT:    Iliana Kennedy was seen today for knee pain. Diagnoses and all orders for this visit:    Primary osteoarthritis of right knee  -     [place] ARTHROCENTESIS ASPIR&/INJ MAJOR JT/BURSA W/O US    Other orders  -     sodium hyaluronate (EUFLEXXA, HYALGAN) injection 20 mg        PLAN: Aspiration and Injectionof the right knee with Euflexxa                                   20mgwas discussed with the patient. Discussion included but was not limited to potential risks and benefits. No contraindications to the procedure were noted. Understanding and agreement was indicated. The procedure was accomplished without incident using appropriate aseptic technique. Recovered was: 1 cc clear and yellow joint fluid which was discarded  follow up as needed  Lot number [id number]      Return if symptoms worsen or fail to improve.        Current Outpatient Medications   Medication Sig Dispense Refill    aspirin 325 MG tablet Take 325 mg by mouth daily      pantoprazole (PROTONIX) 40 MG tablet Take 1 tablet by mouth daily (Patient taking differently: Take 40 mg by mouth daily am) 90 tablet 0    montelukast (SINGULAIR) 10 MG tablet Take 1 tablet by mouth daily 90 tablet 0    metoprolol succinate (TOPROL XL) 50 MG extended release tablet Take 1 tablet by mouth 2 times daily 180 tablet 0    isosorbide mononitrate (IMDUR) 30 MG extended release tablet Take 1 tablet by mouth daily am 90 tablet 0    loratadine (CLARITIN) 10 MG tablet Take 1 tablet by mouth daily 90 tablet 0    furosemide (LASIX) 20 MG tablet Take 1 tablet by mouth daily 90 tablet 0    Evolocumab 140 MG/ML SOAJ 1 SC EVERY 2 WEEKS FOR CHOLESTEROL (Patient taking differently: 1 SC EVERY 2 WEEKS FOR CHOLESTEROL. Due  7/28/22) 2 mL 5    Cholecalciferol (VITAMIN D3) 125 MCG (5000 UT) TABS Take 1 tablet by mouth daily 90 tablet 0    diclofenac sodium (VOLTAREN) 1 % GEL Apply 4 g topically 2 times daily (Patient taking differently: Apply 4 g topically 2 times daily as needed) 100 g 1    acetaminophen (TYLENOL) 650 MG extended release tablet Take 650 mg by mouth 2 times daily as needed       mometasone (NASONEX) 50 MCG/ACT nasal spray 1 spray by Each Nostril route as needed       Albuterol Sulfate (PROAIR HFA IN) Inhale 2 puffs into the lungs 4 times daily      azelastine (ASTELIN) 0.1 % nasal spray 1 spray by Nasal route 2 times daily Use in each nostril as directed      ibuprofen (ADVIL;MOTRIN) 200 MG tablet Take 200 mg by mouth every 6 hours as needed for Pain Dr. Shiraz Richardson anticoagulation      nitroGLYCERIN (NITROSTAT) 0.4 MG SL tablet Place 0.4 mg under the tongue every 5 minutes as needed for Chest pain up to max of 3 total doses. If no relief after 1 dose, call 911. No current facility-administered medications for this visit.        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 Lucero Mcdaniel last seen 1/3/22    GERD (gastroesophageal reflux disease)     Hyperlipidemia     Hypertension     Sinus infection 2022    LD of doxycline- 22       Past Surgical History:   Procedure Laterality Date    APPENDECTOMY      APPENDECTOMY      BLADDER SURGERY      BLADDER SUSPENSION      CARDIAC SURGERY      CATARACT EXTRACTION Left 2022    CHOLECYSTECTOMY      CHOLECYSTECTOMY      COLONOSCOPY  2012    diverticulosis, hemorrhoid    COLONOSCOPY  2015    CORONARY ANGIOPLASTY  10/11/2019    Angioplasty LAD by Dr Cuate Rojas  2019    Bypass x4. [facility].  LIMB-LAD, SVG-OM1 SVG-OM2 SVG-RCA; Dr. Lucero Mcdaniel- last seen 1/3/22    CYST REMOVAL      back    DILATION AND CURETTAGE OF UTERUS      HYSTERECTOMY (CERVIX STATUS UNKNOWN)      INTRACAPSULAR CATARACT EXTRACTION Left 2022    CATARACT EXTRACTION WITH INTRAOCULAR LENS IMPLANT LEFT EYE performed by Jose Michel DO at [address]   Allergen Reactions    Statins [Statins] Swelling and Rash     Muscle aches tongue swelled    Adhesive Tape      Causes reddness    Duricef [Cefadroxil]      Doesn't remember reaction    Gabapentin      Doesn't remember reaction    Oxaprozin      Possible rash    Sulfa Antibiotics Rash       Social History     Socioeconomic History    Marital status:       Spouse name: None    Number of children: None    Years of education: None    Highest education level: None   Tobacco Use    Smoking status: Former     Packs/day: 1.00     Years: 37.00     Pack years: 37.00     Types: Cigarettes     Start date:      Quit date:      Years since quittin.8    Smokeless tobacco: Never   Vaping Use    Vaping Use: Never used   Substance and Sexual Activity    Alcohol use: No    Drug use: No     Social Determinants of Health     Financial Resource Strain: Low Risk     Difficulty of Paying Living Expenses: Not hard at all   Food Insecurity: No Food Insecurity    Worried About Running Out of Food in the Last Year: Never true    Ran Out of Food in the Last Year: Never true       Review of Systems  As follows except as previously noted in HPI:  Constitutional: Negative for chills, diaphoresis, fatigue, fever and unexpected weight change. Respiratory: Negative for cough, shortness of breath and wheezing. Cardiovascular: Negative for chest pain and palpitations. Neurological: Negative for dizziness, syncope, cephalgia. GI / : negative  Musculoskeletal: see HPI       Objective:   Physical Exam   Constitutional: Oriented to person, place, and time. and appears well-developed and well-nourished. :   Head: Normocephalic and atraumatic. Eyes: EOM are normal.   Neck: Neck supple. Cardiovascular: Normal rate and regular rhythm. Pulmonary/Chest: Effort normal. No stridor. No respiratory distress, no wheezes. Abdominal:  No abnormal distension. Neurological: Alert and oriented to person, place, and time. Skin: Skin is warm and dry. Psychiatric: Normal mood and affect.  Behavior is normal. Thought content normal.    HARLAN García DO    10/28/22  12:13 PM

## 2022-11-08 ENCOUNTER — OFFICE VISIT (OUTPATIENT)
Dept: PRIMARY CARE CLINIC | Age: 82
End: 2022-11-08
Payer: MEDICARE

## 2022-11-08 VITALS
BODY MASS INDEX: 39.16 KG/M2 | DIASTOLIC BLOOD PRESSURE: 72 MMHG | WEIGHT: 221 LBS | OXYGEN SATURATION: 97 % | TEMPERATURE: 96.8 F | HEIGHT: 63 IN | SYSTOLIC BLOOD PRESSURE: 130 MMHG | HEART RATE: 83 BPM

## 2022-11-08 DIAGNOSIS — E78.5 HYPERLIPIDEMIA LDL GOAL <100: ICD-10-CM

## 2022-11-08 DIAGNOSIS — J31.0 CHRONIC RHINITIS: ICD-10-CM

## 2022-11-08 DIAGNOSIS — I25.728 CORONARY ARTERY DISEASE OF AUTOLOGOUS BYPASS GRAFT WITH STABLE ANGINA PECTORIS (HCC): ICD-10-CM

## 2022-11-08 DIAGNOSIS — I50.31 DIASTOLIC CHF, ACUTE (HCC): ICD-10-CM

## 2022-11-08 DIAGNOSIS — I10 ESSENTIAL HYPERTENSION: Primary | ICD-10-CM

## 2022-11-08 PROCEDURE — 3078F DIAST BP <80 MM HG: CPT | Performed by: INTERNAL MEDICINE

## 2022-11-08 PROCEDURE — 3074F SYST BP LT 130 MM HG: CPT | Performed by: INTERNAL MEDICINE

## 2022-11-08 PROCEDURE — 1123F ACP DISCUSS/DSCN MKR DOCD: CPT | Performed by: INTERNAL MEDICINE

## 2022-11-08 PROCEDURE — 99213 OFFICE O/P EST LOW 20 MIN: CPT | Performed by: INTERNAL MEDICINE

## 2022-11-08 RX ORDER — PANTOPRAZOLE SODIUM 40 MG/1
40 TABLET, DELAYED RELEASE ORAL DAILY
Qty: 90 TABLET | Refills: 0 | Status: SHIPPED | OUTPATIENT
Start: 2022-11-08

## 2022-11-08 RX ORDER — MONTELUKAST SODIUM 10 MG/1
10 TABLET ORAL DAILY
Qty: 90 TABLET | Refills: 0 | Status: SHIPPED | OUTPATIENT
Start: 2022-11-08

## 2022-11-08 RX ORDER — LORATADINE 10 MG/1
10 TABLET ORAL DAILY
Qty: 90 TABLET | Refills: 0 | Status: SHIPPED | OUTPATIENT
Start: 2022-11-08

## 2022-11-08 RX ORDER — ISOSORBIDE MONONITRATE 30 MG/1
30 TABLET, EXTENDED RELEASE ORAL DAILY
Qty: 90 TABLET | Refills: 0 | Status: SHIPPED | OUTPATIENT
Start: 2022-11-08

## 2022-11-08 RX ORDER — METOPROLOL SUCCINATE 50 MG/1
50 TABLET, EXTENDED RELEASE ORAL 2 TIMES DAILY
Qty: 180 TABLET | Refills: 0 | Status: SHIPPED | OUTPATIENT
Start: 2022-11-08

## 2022-11-08 NOTE — PROGRESS NOTES
Chief Complaint   Patient presents with    Follow-up     From Sept 2022  no labs or tests since last visit. Dr. Shaylee Park put shots in her right knee and has started physical therapy for hips. 1.  Here for refills,    2   states she doesn't have any issues to discuss    Other     Care gaps - AWV scheduled,        HPI:  Patient is here for follow-up   Patient feels well facial pain subsided. Compliant on medications  No cardiopulmonary symptoms. She is managing her constipation well no abdominal pain  Requesed refills on medications. Patient is receiving Repatha injection every 2 weeks through a emily. Last blood work total cholesterol 158 LDL cholesterol 88    Past Medical History, Surgical History, and Family History has been reviewed and updated.     Review of Systems:  Constitutional:  No fever, no fatigue, no chills, no headaches, no weight change  Dermatology:  No rash, no mole, no dry or sensitive skin  ENT:  No cough, no sore throat, no sinus pain, no runny nose, no ear pain  Cardiology:  No chest pain, no palpitations, no leg edema, no shortness of breath, no PND  Gastroenterology:  No dysphagia, no abdominal pain, no nausea, no vomiting, no constipation, no diarrhea, no heartburn  Musculoskeletal:  No joint pain, no leg cramps, no back pain, no muscle aches  Respiratory:  No shortness of breath, no orthopnea, no wheezing, no PINTO, no hemoptysis  Urology:  No blood in the urine, no urinary frequency, no urinary incontinence, no urinary urgency, no nocturia, no dysuria    Vitals:    11/08/22 0917 11/08/22 0935   BP: (!) 150/80 130/72   Site: Right Upper Arm    Position: Sitting    Cuff Size: Large Adult    Pulse: 83    Temp: 96.8 °F (36 °C)    SpO2: 97%    Weight: 221 lb (100.2 kg)    Height: 5' 3\" (1.6 m)        General:  Patient alert and oriented x 3, NAD, pleasant  HEENT:  Atraumatic, normocephalic, PERRLA, EOMI, clear conjunctiva, TMs clear, nose-clear, throat - no erythema  Neck:  Supple, no goiter, no carotid bruits, no LAD  Lungs:  CTA   Heart:  RRR, no murmurs, gallops or rubs  Abdomen:  Soft/nt/nd, + bowel sounds  Lymph node examination: unremarkable  Neurological exam : unremarkable  Extremities:  No clubbing, cyanosis or edema  Skin: unremarkable    Hemoglobin A1C   Date Value Ref Range Status   05/27/2020 6.0 % Final     Cholesterol, Total   Date Value Ref Range Status   08/19/2022 158 0 - 199 mg/dL Final     Triglycerides   Date Value Ref Range Status   08/19/2022 132 0 - 149 mg/dL Final     HDL   Date Value Ref Range Status   08/19/2022 44 >40 mg/dL Final     LDL Calculated   Date Value Ref Range Status   08/19/2022 88 0 - 99 mg/dL Final     VLDL Cholesterol Calculated   Date Value Ref Range Status   08/19/2022 26 mg/dL Final     Sodium   Date Value Ref Range Status   08/19/2022 143 132 - 146 mmol/L Final     Potassium   Date Value Ref Range Status   08/19/2022 4.2 3.5 - 5.0 mmol/L Final     Chloride   Date Value Ref Range Status   08/19/2022 105 98 - 107 mmol/L Final     CO2   Date Value Ref Range Status   08/19/2022 25 22 - 29 mmol/L Final     BUN   Date Value Ref Range Status   08/19/2022 17 6 - 23 mg/dL Final     Creatinine   Date Value Ref Range Status   08/19/2022 0.8 0.5 - 1.0 mg/dL Final     Glucose   Date Value Ref Range Status   08/19/2022 97 74 - 99 mg/dL Final     Calcium   Date Value Ref Range Status   08/19/2022 10.8 (H) 8.6 - 10.2 mg/dL Final     Total Protein   Date Value Ref Range Status   08/19/2022 7.4 6.4 - 8.3 g/dL Final     Albumin   Date Value Ref Range Status   08/19/2022 4.4 3.5 - 5.2 g/dL Final     Total Bilirubin   Date Value Ref Range Status   08/19/2022 0.3 0.0 - 1.2 mg/dL Final     Alkaline Phosphatase   Date Value Ref Range Status   08/19/2022 62 35 - 104 U/L Final     AST   Date Value Ref Range Status   08/19/2022 19 0 - 31 U/L Final     ALT   Date Value Ref Range Status   08/19/2022 16 0 - 32 U/L Final     GFR Non-   Date Value Ref Range Status 08/19/2022 >60 >=60 mL/min/1.73 Final     Comment:     Chronic Kidney Disease: less than 60 ml/min/1.73 sq.m. Kidney Failure: less than 15 ml/min/1.73 sq.m. Results valid for patients 18 years and older. GFR    Date Value Ref Range Status   08/19/2022 >60  Final        No results found. Assessment/Plan:    Essential hypertension controlled  Hyperlipidemia controlled  Coronary artery disease controlled  Irritable bowel disease constipation controlled  CHF due to LV diastolic dysfunction compensated    Outpatient Encounter Medications as of 11/8/2022   Medication Sig Dispense Refill    pantoprazole (PROTONIX) 40 MG tablet Take 1 tablet by mouth daily 90 tablet 0    montelukast (SINGULAIR) 10 MG tablet Take 1 tablet by mouth daily 90 tablet 0    metoprolol succinate (TOPROL XL) 50 MG extended release tablet Take 1 tablet by mouth 2 times daily 180 tablet 0    loratadine (CLARITIN) 10 MG tablet Take 1 tablet by mouth daily 90 tablet 0    isosorbide mononitrate (IMDUR) 30 MG extended release tablet Take 1 tablet by mouth daily am 90 tablet 0    Cholecalciferol (VITAMIN D3) 125 MCG (5000 UT) TABS Take 1 tablet by mouth daily 90 tablet 0    aspirin 325 MG tablet Take 325 mg by mouth daily      furosemide (LASIX) 20 MG tablet Take 1 tablet by mouth daily 90 tablet 0    Evolocumab 140 MG/ML SOAJ 1 SC EVERY 2 WEEKS FOR CHOLESTEROL (Patient taking differently: 1 SC EVERY 2 WEEKS FOR CHOLESTEROL.  Due  7/28/22) 2 mL 5    diclofenac sodium (VOLTAREN) 1 % GEL Apply 4 g topically 2 times daily (Patient taking differently: Apply 4 g topically 2 times daily as needed) 100 g 1    acetaminophen (TYLENOL) 650 MG extended release tablet Take 650 mg by mouth 2 times daily as needed       mometasone (NASONEX) 50 MCG/ACT nasal spray 1 spray by Each Nostril route as needed       Albuterol Sulfate (PROAIR HFA IN) Inhale 2 puffs into the lungs 4 times daily      ibuprofen (ADVIL;MOTRIN) 200 MG tablet Take 200 mg by mouth every 6 hours as needed for Pain Dr. Angelita Lara anticoagulation      nitroGLYCERIN (NITROSTAT) 0.4 MG SL tablet Place 0.4 mg under the tongue every 5 minutes as needed for Chest pain up to max of 3 total doses. If no relief after 1 dose, call 911. [DISCONTINUED] pantoprazole (PROTONIX) 40 MG tablet Take 1 tablet by mouth daily (Patient taking differently: Take 40 mg by mouth daily am) 90 tablet 0    [DISCONTINUED] montelukast (SINGULAIR) 10 MG tablet Take 1 tablet by mouth daily 90 tablet 0    [DISCONTINUED] metoprolol succinate (TOPROL XL) 50 MG extended release tablet Take 1 tablet by mouth 2 times daily 180 tablet 0    [DISCONTINUED] isosorbide mononitrate (IMDUR) 30 MG extended release tablet Take 1 tablet by mouth daily am 90 tablet 0    [DISCONTINUED] loratadine (CLARITIN) 10 MG tablet Take 1 tablet by mouth daily 90 tablet 0    [DISCONTINUED] Cholecalciferol (VITAMIN D3) 125 MCG (5000 UT) TABS Take 1 tablet by mouth daily 90 tablet 0    azelastine (ASTELIN) 0.1 % nasal spray 1 spray by Nasal route 2 times daily Use in each nostril as directed       No facility-administered encounter medications on file as of 11/8/2022. Sujit Choe was seen today for follow-up and other. 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 On this date 11/8/2022 I have spent 25 minutes reviewing previous notes, test results and face to face with the patient discussing the diagnosis and importance of compliance with the treatment plan as well as documenting on the day of the visit.       Angel Lemus MD   11/8/22

## 2022-11-29 ENCOUNTER — OFFICE VISIT (OUTPATIENT)
Dept: PHYSICAL MEDICINE AND REHAB | Age: 82
End: 2022-11-29
Payer: MEDICARE

## 2022-11-29 VITALS
DIASTOLIC BLOOD PRESSURE: 81 MMHG | WEIGHT: 221 LBS | SYSTOLIC BLOOD PRESSURE: 132 MMHG | HEIGHT: 63 IN | HEART RATE: 72 BPM | BODY MASS INDEX: 39.16 KG/M2

## 2022-11-29 DIAGNOSIS — M53.3 SACROILIAC JOINT DYSFUNCTION OF RIGHT SIDE: ICD-10-CM

## 2022-11-29 DIAGNOSIS — M47.816 LUMBAR SPONDYLOSIS: ICD-10-CM

## 2022-11-29 DIAGNOSIS — M53.3 SACROILIAC JOINT PAIN: Primary | ICD-10-CM

## 2022-11-29 PROCEDURE — 1123F ACP DISCUSS/DSCN MKR DOCD: CPT | Performed by: PHYSICAL MEDICINE & REHABILITATION

## 2022-11-29 PROCEDURE — 99212 OFFICE O/P EST SF 10 MIN: CPT | Performed by: PHYSICAL MEDICINE & REHABILITATION

## 2022-11-29 PROCEDURE — 3074F SYST BP LT 130 MM HG: CPT | Performed by: PHYSICAL MEDICINE & REHABILITATION

## 2022-11-29 PROCEDURE — 3078F DIAST BP <80 MM HG: CPT | Performed by: PHYSICAL MEDICINE & REHABILITATION

## 2022-11-29 NOTE — PROGRESS NOTES
Jason Kehr, [id number] [facility] Physical Medicine and Rehabilitation  [address] [facility] [place]  Phone: [phone]  Fax: [phone]    PCP: Jany Velasquez MD  Date of visit: 11/29/22    Chief Complaint   Patient presents with    Back Pain     6 week follow up       Interval:   Patient presents today for follow up visit after doing PT. She reports significant improvement in her back and knee pain. She reports some stiffness when getting up but she is then able to work this out. She got knee injections which helped. She has home exercises she is doing at home. She is happy with where she is and has no other complaints. The pain is rated Pain Score:   3.      The prior workup has included: Xray, MRI L spine 2020     The prior treatment has included:  PT: yes with relief   Chiropractic: none    Modalities: none    OTC Tylenol: yes with mild relief   NSAIDS: yes with relief   Opioids: none   Membrane stabilizers: none   Muscle relaxers: none    Previous injections: none in back, currently for right knee    Previous surgery at this site: none    Allergies   Allergen Reactions    Statins [Statins] Swelling and Rash     Muscle aches tongue swelled    Adhesive Tape      Causes reddness    Duricef [Cefadroxil]      Doesn't remember reaction    Gabapentin      Doesn't remember reaction    Oxaprozin      Possible rash    Sulfa Antibiotics Rash       Current Outpatient Medications   Medication Sig Dispense Refill    pantoprazole (PROTONIX) 40 MG tablet Take 1 tablet by mouth daily 90 tablet 0    montelukast (SINGULAIR) 10 MG tablet Take 1 tablet by mouth daily 90 tablet 0    metoprolol succinate (TOPROL XL) 50 MG extended release tablet Take 1 tablet by mouth 2 times daily 180 tablet 0    loratadine (CLARITIN) 10 MG tablet Take 1 tablet by mouth daily 90 tablet 0    isosorbide mononitrate (IMDUR) 30 MG extended release tablet Take 1 tablet by mouth daily am 90 tablet 0    Cholecalciferol (VITAMIN D3) 125 MCG (5000 UT) TABS Take 1 tablet by mouth daily 90 tablet 0    aspirin 325 MG tablet Take 325 mg by mouth daily      furosemide (LASIX) 20 MG tablet Take 1 tablet by mouth daily 90 tablet 0    Evolocumab 140 MG/ML SOAJ 1 SC EVERY 2 WEEKS FOR CHOLESTEROL (Patient taking differently: 1 SC EVERY 2 WEEKS FOR CHOLESTEROL. Due  7/28/22) 2 mL 5    diclofenac sodium (VOLTAREN) 1 % GEL Apply 4 g topically 2 times daily (Patient taking differently: Apply 4 g topically 2 times daily as needed) 100 g 1    acetaminophen (TYLENOL) 650 MG extended release tablet Take 650 mg by mouth 2 times daily as needed       mometasone (NASONEX) 50 MCG/ACT nasal spray 1 spray by Each Nostril route as needed       Albuterol Sulfate (PROAIR HFA IN) Inhale 2 puffs into the lungs 4 times daily      azelastine (ASTELIN) 0.1 % nasal spray 1 spray by Nasal route 2 times daily Use in each nostril as directed      ibuprofen (ADVIL;MOTRIN) 200 MG tablet Take 200 mg by mouth every 6 hours as needed for Pain Dr. Ines Anthony anticoagulation      nitroGLYCERIN (NITROSTAT) 0.4 MG SL tablet Place 0.4 mg under the tongue every 5 minutes as needed for Chest pain up to max of 3 total doses. If no relief after 1 dose, call 911. No current facility-administered medications for this visit.        Past Medical History:   Diagnosis Date    Arthritis     shoulders, hips and knees, fingers    Bronchitis     CAD (coronary artery disease)     some blockage, being treated with aspirin; Dr. Chapis Dick last seen 1/3/22    GERD (gastroesophageal reflux disease)     Hyperlipidemia     Hypertension     Sinus infection 06/22/2022    LD of doxycline- 7/2/22       Past Surgical History:   Procedure Laterality Date    APPENDECTOMY      APPENDECTOMY      BLADDER SURGERY      BLADDER SUSPENSION      CARDIAC SURGERY      CATARACT EXTRACTION Left 07/01/2022    CHOLECYSTECTOMY      CHOLECYSTECTOMY      COLONOSCOPY  11/29/2012    diverticulosis, hemorrhoid    COLONOSCOPY 2015    CORONARY ANGIOPLASTY  10/11/2019    Angioplasty LAD by Dr Alma Rodriguez  2019    Bypass x4. H.  LIMB-LAD, SVG-OM1 SVG-OM2 SVG-RCA; Dr. Bethany Meyer- last seen 1/3/22    CYST REMOVAL      back    DILATION AND CURETTAGE OF UTERUS      HYSTERECTOMY (CERVIX STATUS UNKNOWN)      INTRACAPSULAR CATARACT EXTRACTION Left 2022    CATARACT EXTRACTION WITH INTRAOCULAR LENS IMPLANT LEFT EYE performed by Jameson Castaneda DO at [address] [place] [address]       Family History   Problem Relation Age of Onset    Cancer Mother     Stroke Father        Social History     Tobacco Use    Smoking status: Former     Packs/day: 1.00     Years: 37.00     Pack years: 37.00     Types: Cigarettes     Start date:      Quit date:      Years since quittin.9    Smokeless tobacco: Never   Vaping Use    Vaping Use: Never used   Substance Use Topics    Alcohol use: No    Drug use: No          Functional Status: The patient is able to ambulate and perform activities of daily living with the use of an assistive device. ROS:    Constitutional: Denies fevers, chills, night sweats, unintentional weight loss     Skin: Denies rash or skin changes     Eyes: Denies vision changes    Ears/Nose/Throat: Denies nasal congestion or sore throat     Respiratory: Denies SOB or cough     Cardiovascular: Denies CP, palpitations, edema      Gastrointestinal: Denies abdominal pain,  N/V, constipation, or diarrhea    Genitourinary: Denies urinary symptoms    Neurologic: See HPI.     MSK: See HPI. Psychiatric: Denies sleep disturbance, anxiety, depression    Hematologic/Lymphatic/Immunologic: Denies bruising       Physical Exam:   Blood pressure 132/81, pulse 72, height 5' 3\" (1.6 m), weight 221 lb (100.2 kg), not currently breastfeeding. General: well developed and well nourished in no acute distress. Body habitus is obese  HEENT: No rhinorrhea, sneezing, yawning, or lacrimation.  No scleral icterus or conjunctival injection. Resp: symmetrical chest expansion, unlabored breathing, respirations unlabored. CV: Heart rate is regular. Peripheral pulses are palpable  Lymph: No visible regional lymphadenopathy. Skin: No rashes or ecchymosis. Normal turgor. Psych: Mood is calm. Affect is normal.   Ext: No edema noted     MSK:   Back/Hip Exam:   Inspection: Pelvis was asymmetric. Lumbar lordotic curvature was increased. There was no scoliosis. No ecchymoses or erythema. Palpation: Palpatory exam revealed no tenderness along lumbosacral paraspinals, midline spine, no ttp SI joint sulci, no ttp greater trochanters and TFL on both side. There was no paraspinal spasms. There were no trigger points. Neurological Exam:  Gait is antalgic to right side     Imaging: (personally reviewed by me 11/29/22)  MRI L Spine 2020  Right knee x-ray     Impression:   Leni Anderson is a [de-identified] y.o. female     1. Sacroiliac joint pain    2. Sacroiliac joint dysfunction of right side    3. Lumbar spondylosis          Plan:   She did well with therapy and overall improved. She will continue home exercises and follow up as needed. The patient was educated about the diagnosis, prognosis, indications, risks and benefits of treatment. An opportunity to ask questions was given to the patient and questions were answered. The patient agreed to proceed with the recommended treatment as described above.      Michelle Hollins DO, [facility]   Board Certified Physical Medicine and Rehabilitation

## 2022-12-15 RX ORDER — EVOLOCUMAB 140 MG/ML
INJECTION, SOLUTION SUBCUTANEOUS
Qty: 2 ML | Refills: 5 | Status: SHIPPED | OUTPATIENT
Start: 2022-12-15

## 2023-02-01 DIAGNOSIS — I50.31 DIASTOLIC CHF, ACUTE (HCC): ICD-10-CM

## 2023-02-01 DIAGNOSIS — I10 ESSENTIAL HYPERTENSION: ICD-10-CM

## 2023-02-01 LAB
ALBUMIN SERPL-MCNC: 4.5 G/DL (ref 3.5–5.2)
ALP BLD-CCNC: 66 U/L (ref 35–104)
ALT SERPL-CCNC: 18 U/L (ref 0–32)
ANION GAP SERPL CALCULATED.3IONS-SCNC: 8 MMOL/L (ref 7–16)
AST SERPL-CCNC: 24 U/L (ref 0–31)
BILIRUB SERPL-MCNC: 0.3 MG/DL (ref 0–1.2)
BUN BLDV-MCNC: 17 MG/DL (ref 6–23)
CALCIUM SERPL-MCNC: 10.9 MG/DL (ref 8.6–10.2)
CHLORIDE BLD-SCNC: 104 MMOL/L (ref 98–107)
CHOLESTEROL, TOTAL: 163 MG/DL (ref 0–199)
CO2: 29 MMOL/L (ref 22–29)
CREAT SERPL-MCNC: 0.8 MG/DL (ref 0.5–1)
GFR SERPL CREATININE-BSD FRML MDRD: >60 ML/MIN/1.73
GLUCOSE BLD-MCNC: 115 MG/DL (ref 74–99)
HDLC SERPL-MCNC: 47 MG/DL
LDL CHOLESTEROL CALCULATED: 91 MG/DL (ref 0–99)
POTASSIUM SERPL-SCNC: 4 MMOL/L (ref 3.5–5)
SODIUM BLD-SCNC: 141 MMOL/L (ref 132–146)
TOTAL PROTEIN: 7.5 G/DL (ref 6.4–8.3)
TRIGL SERPL-MCNC: 127 MG/DL (ref 0–149)
VLDLC SERPL CALC-MCNC: 25 MG/DL

## 2023-02-07 ENCOUNTER — OFFICE VISIT (OUTPATIENT)
Dept: PRIMARY CARE CLINIC | Age: 83
End: 2023-02-07
Payer: MEDICARE

## 2023-02-07 VITALS
HEIGHT: 63 IN | DIASTOLIC BLOOD PRESSURE: 80 MMHG | BODY MASS INDEX: 39.18 KG/M2 | TEMPERATURE: 97.1 F | HEART RATE: 62 BPM | SYSTOLIC BLOOD PRESSURE: 148 MMHG | WEIGHT: 221.1 LBS | OXYGEN SATURATION: 98 %

## 2023-02-07 DIAGNOSIS — I25.728 CORONARY ARTERY DISEASE OF AUTOLOGOUS BYPASS GRAFT WITH STABLE ANGINA PECTORIS (HCC): ICD-10-CM

## 2023-02-07 DIAGNOSIS — E78.5 HYPERLIPIDEMIA LDL GOAL <100: ICD-10-CM

## 2023-02-07 DIAGNOSIS — E83.52 HYPERCALCEMIA: Primary | ICD-10-CM

## 2023-02-07 DIAGNOSIS — J31.0 CHRONIC RHINITIS: ICD-10-CM

## 2023-02-07 DIAGNOSIS — I50.31 DIASTOLIC CHF, ACUTE (HCC): ICD-10-CM

## 2023-02-07 DIAGNOSIS — I10 ESSENTIAL HYPERTENSION: ICD-10-CM

## 2023-02-07 PROCEDURE — 1123F ACP DISCUSS/DSCN MKR DOCD: CPT | Performed by: INTERNAL MEDICINE

## 2023-02-07 PROCEDURE — 3079F DIAST BP 80-89 MM HG: CPT | Performed by: INTERNAL MEDICINE

## 2023-02-07 PROCEDURE — 99214 OFFICE O/P EST MOD 30 MIN: CPT | Performed by: INTERNAL MEDICINE

## 2023-02-07 PROCEDURE — 3077F SYST BP >= 140 MM HG: CPT | Performed by: INTERNAL MEDICINE

## 2023-02-07 RX ORDER — MONTELUKAST SODIUM 10 MG/1
10 TABLET ORAL DAILY
Qty: 90 TABLET | Refills: 0 | Status: SHIPPED | OUTPATIENT
Start: 2023-02-07

## 2023-02-07 RX ORDER — PANTOPRAZOLE SODIUM 40 MG/1
40 TABLET, DELAYED RELEASE ORAL DAILY
Qty: 90 TABLET | Refills: 0 | Status: SHIPPED | OUTPATIENT
Start: 2023-02-07

## 2023-02-07 RX ORDER — ISOSORBIDE MONONITRATE 30 MG/1
30 TABLET, EXTENDED RELEASE ORAL DAILY
Qty: 90 TABLET | Refills: 0 | Status: SHIPPED | OUTPATIENT
Start: 2023-02-07

## 2023-02-07 RX ORDER — LORATADINE 10 MG/1
10 TABLET ORAL DAILY
Qty: 90 TABLET | Refills: 0 | Status: SHIPPED | OUTPATIENT
Start: 2023-02-07

## 2023-02-07 RX ORDER — SPIRONOLACTONE 25 MG/1
25 TABLET ORAL DAILY
Qty: 30 TABLET | Refills: 0 | Status: SHIPPED | OUTPATIENT
Start: 2023-02-07

## 2023-02-07 RX ORDER — METOPROLOL SUCCINATE 50 MG/1
50 TABLET, EXTENDED RELEASE ORAL 2 TIMES DAILY
Qty: 180 TABLET | Refills: 0 | Status: SHIPPED | OUTPATIENT
Start: 2023-02-07

## 2023-02-07 SDOH — ECONOMIC STABILITY: FOOD INSECURITY: WITHIN THE PAST 12 MONTHS, THE FOOD YOU BOUGHT JUST DIDN'T LAST AND YOU DIDN'T HAVE MONEY TO GET MORE.: NEVER TRUE

## 2023-02-07 SDOH — ECONOMIC STABILITY: INCOME INSECURITY: HOW HARD IS IT FOR YOU TO PAY FOR THE VERY BASICS LIKE FOOD, HOUSING, MEDICAL CARE, AND HEATING?: NOT HARD AT ALL

## 2023-02-07 SDOH — ECONOMIC STABILITY: FOOD INSECURITY: WITHIN THE PAST 12 MONTHS, YOU WORRIED THAT YOUR FOOD WOULD RUN OUT BEFORE YOU GOT MONEY TO BUY MORE.: NEVER TRUE

## 2023-02-07 SDOH — ECONOMIC STABILITY: HOUSING INSECURITY
IN THE LAST 12 MONTHS, WAS THERE A TIME WHEN YOU DID NOT HAVE A STEADY PLACE TO SLEEP OR SLEPT IN A SHELTER (INCLUDING NOW)?: NO

## 2023-02-07 ASSESSMENT — PATIENT HEALTH QUESTIONNAIRE - PHQ9
SUM OF ALL RESPONSES TO PHQ QUESTIONS 1-9: 0
2. FEELING DOWN, DEPRESSED OR HOPELESS: 0
SUM OF ALL RESPONSES TO PHQ9 QUESTIONS 1 & 2: 0
SUM OF ALL RESPONSES TO PHQ QUESTIONS 1-9: 0
1. LITTLE INTEREST OR PLEASURE IN DOING THINGS: 0
SUM OF ALL RESPONSES TO PHQ QUESTIONS 1-9: 0
SUM OF ALL RESPONSES TO PHQ QUESTIONS 1-9: 0

## 2023-02-07 NOTE — PROGRESS NOTES
Chief Complaint   Patient presents with    3 Month Follow-Up     Pt is here as a 3 month F/U, and labs on file for review from 2/1. HPI:  Patient is here for follow-up. Patient feels well overall independent active drives her car does her grocery shopping in [address] [facility],   [address] of occasional shortness of breath when she lays down flat at night gets better by relaxing according to her no leg swelling, patient does not do any steps lives on 1 floor. Patient is compliant on medications requested some refills,  Lab work recently showed serum calcium 10.9.  GFR>60    LDL cholesterol 91 patient is currently receiving Repatha subcu every 2 weeks      Past Medical History, Surgical History, and Family History has been reviewed and updated.     Review of Systems:  Constitutional:  No fever, no fatigue, no chills, no headaches, no weight change  Dermatology:  No rash, no mole, no dry or sensitive skin  ENT:  No cough, no sore throat, no sinus pain, no runny nose, no ear pain  Cardiology:  No chest pain, no palpitations, no leg edema, no shortness of breath, no PND  Gastroenterology:  No dysphagia, no abdominal pain, no nausea, no vomiting, no constipation, no diarrhea, no heartburn  Musculoskeletal:  No joint pain, no leg cramps, no back pain, no muscle aches  Respiratory:  No shortness of breath, no orthopnea, no wheezing, no PINTO, no hemoptysis  Urology:  No blood in the urine, no urinary frequency, no urinary incontinence, no urinary urgency, no nocturia, no dysuria    Vitals:    02/07/23 0943   BP: (!) 148/80   Pulse: 62   Temp: 97.1 °F (36.2 °C)   TempSrc: Temporal   SpO2: 98%   Weight: 221 lb 1.6 oz (100.3 kg)   Height: 5' 3\" (1.6 m)       General:  Patient alert and oriented x 3, NAD, pleasant  HEENT:  Atraumatic, normocephalic, PERRLA, EOMI, clear conjunctiva, TMs clear, nose-clear, throat - no erythema  Neck:  Supple, no goiter, no carotid bruits, no LAD  Lungs:  CTA   Heart:  RRR, no murmurs, gallops or rubs  Abdomen:  Soft/nt/nd, + bowel sounds  Lymph node examination: unremarkable  Neurological exam : unremarkable  Extremities:  No clubbing, cyanosis or edema  Skin: unremarkable    Hemoglobin A1C   Date Value Ref Range Status   05/27/2020 6.0 % Final     Cholesterol, Total   Date Value Ref Range Status   02/01/2023 163 0 - 199 mg/dL Final     Triglycerides   Date Value Ref Range Status   02/01/2023 127 0 - 149 mg/dL Final     HDL   Date Value Ref Range Status   02/01/2023 47 >40 mg/dL Final     LDL Calculated   Date Value Ref Range Status   02/01/2023 91 0 - 99 mg/dL Final     VLDL Cholesterol Calculated   Date Value Ref Range Status   02/01/2023 25 mg/dL Final     Sodium   Date Value Ref Range Status   02/01/2023 141 132 - 146 mmol/L Final     Potassium   Date Value Ref Range Status   02/01/2023 4.0 3.5 - 5.0 mmol/L Final     Chloride   Date Value Ref Range Status   02/01/2023 104 98 - 107 mmol/L Final     CO2   Date Value Ref Range Status   02/01/2023 29 22 - 29 mmol/L Final     BUN   Date Value Ref Range Status   02/01/2023 17 6 - 23 mg/dL Final     Creatinine   Date Value Ref Range Status   02/01/2023 0.8 0.5 - 1.0 mg/dL Final     Glucose   Date Value Ref Range Status   02/01/2023 115 (H) 74 - 99 mg/dL Final     Calcium   Date Value Ref Range Status   02/01/2023 10.9 (H) 8.6 - 10.2 mg/dL Final     Total Protein   Date Value Ref Range Status   02/01/2023 7.5 6.4 - 8.3 g/dL Final     Albumin   Date Value Ref Range Status   02/01/2023 4.5 3.5 - 5.2 g/dL Final     Total Bilirubin   Date Value Ref Range Status   02/01/2023 0.3 0.0 - 1.2 mg/dL Final     Alkaline Phosphatase   Date Value Ref Range Status   02/01/2023 66 35 - 104 U/L Final     AST   Date Value Ref Range Status   02/01/2023 24 0 - 31 U/L Final     ALT   Date Value Ref Range Status   02/01/2023 18 0 - 32 U/L Final     Est, Glom Filt Rate   Date Value Ref Range Status   02/01/2023 >60 >=60 mL/min/1.73 Final     Comment:     Pediatric calculator link  [email or web address]_[email or web address]  Effective Oct 3, 2022  These results are not intended for use in patients  <25years of age. eGFR results are calculated without  a race factor using the 2021 CKD-EPI equation. Careful  clinical correlation is recommended, particularly when  comparing to results calculated using previous equations. The CKD-EPI equation is less accurate in patients with  extremes of muscle mass, extra-renal metabolism of  creatinine, excessive creatinine ingestion, or following  therapy that affects renal tubular secretion. GFR    Date Value Ref Range Status   08/19/2022 >60  Final        No results found. Assessment/Plan:    Hypercalcemia  Diastolic congestive heart failure, remittent decompensation. Start spironolactone 25 mg tablet daily cardiology evaluation pending.   Essential hypertension uncontrolled  Hyperlipidemia  Coronary artery disease    Outpatient Encounter Medications as of 2/7/2023   Medication Sig Dispense Refill    pantoprazole (PROTONIX) 40 MG tablet Take 1 tablet by mouth daily 90 tablet 0    montelukast (SINGULAIR) 10 MG tablet Take 1 tablet by mouth daily 90 tablet 0    metoprolol succinate (TOPROL XL) 50 MG extended release tablet Take 1 tablet by mouth 2 times daily 180 tablet 0    loratadine (CLARITIN) 10 MG tablet Take 1 tablet by mouth daily 90 tablet 0    isosorbide mononitrate (IMDUR) 30 MG extended release tablet Take 1 tablet by mouth daily am 90 tablet 0    spironolactone (ALDACTONE) 25 MG tablet Take 1 tablet by mouth daily 30 tablet 0    Evolocumab (REPATHA SURECLICK) 894 MG/ML SOAJ inject 1 milliliter subcutaneously EVERY 2 WEEKS 2 mL 5    Cholecalciferol (VITAMIN D3) 125 MCG (5000 UT) TABS Take 1 tablet by mouth daily 90 tablet 0    aspirin 325 MG tablet Take 325 mg by mouth daily      furosemide (LASIX) 20 MG tablet Take 1 tablet by mouth daily 90 tablet 0    diclofenac sodium (VOLTAREN) 1 % GEL Apply 4 g topically 2 times daily (Patient taking differently: Apply 4 g topically 2 times daily as needed) 100 g 1    acetaminophen (TYLENOL) 650 MG extended release tablet Take 650 mg by mouth 2 times daily as needed       mometasone (NASONEX) 50 MCG/ACT nasal spray 1 spray by Each Nostril route as needed       Albuterol Sulfate (PROAIR HFA IN) Inhale 2 puffs into the lungs 4 times daily      azelastine (ASTELIN) 0.1 % nasal spray 1 spray by Nasal route 2 times daily Use in each nostril as directed      ibuprofen (ADVIL;MOTRIN) 200 MG tablet Take 200 mg by mouth every 6 hours as needed for Pain Dr. Hira Guajardo anticoagulation      nitroGLYCERIN (NITROSTAT) 0.4 MG SL tablet Place 0.4 mg under the tongue every 5 minutes as needed for Chest pain up to max of 3 total doses. If no relief after 1 dose, call 911. [DISCONTINUED] Evolocumab 140 MG/ML SOAJ 1 SC EVERY 2 WEEKS FOR CHOLESTEROL 2 mL 5    [DISCONTINUED] pantoprazole (PROTONIX) 40 MG tablet Take 1 tablet by mouth daily 90 tablet 0    [DISCONTINUED] montelukast (SINGULAIR) 10 MG tablet Take 1 tablet by mouth daily 90 tablet 0    [DISCONTINUED] metoprolol succinate (TOPROL XL) 50 MG extended release tablet Take 1 tablet by mouth 2 times daily 180 tablet 0    [DISCONTINUED] loratadine (CLARITIN) 10 MG tablet Take 1 tablet by mouth daily 90 tablet 0    [DISCONTINUED] isosorbide mononitrate (IMDUR) 30 MG extended release tablet Take 1 tablet by mouth daily am 90 tablet 0     No facility-administered encounter medications on file as of 2/7/2023. Brandon Yu was seen today for 3 month follow-up. Diagnoses and all orders for this visit:    Hypercalcemia    Chronic rhinitis  -     montelukast (SINGULAIR) 10 MG tablet; Take 1 tablet by mouth daily  -     loratadine (CLARITIN) 10 MG tablet; Take 1 tablet by mouth daily    Diastolic CHF, acute (HCC)  -     metoprolol succinate (TOPROL XL) 50 MG extended release tablet;  Take 1 tablet by mouth 2 times daily  - Comprehensive Metabolic Panel; Future  -     spironolactone (ALDACTONE) 25 MG tablet; Take 1 tablet by mouth daily    Essential hypertension  -     metoprolol succinate (TOPROL XL) 50 MG extended release tablet; Take 1 tablet by mouth 2 times daily  -     Comprehensive Metabolic Panel; Future  -     spironolactone (ALDACTONE) 25 MG tablet; Take 1 tablet by mouth daily    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 (HCC)  -     isosorbide mononitrate (IMDUR) 30 MG extended release tablet; Take 1 tablet by mouth daily am    Hyperlipidemia LDL goal <100  -     Calcium, Ionized; Future  -     PTH, Intact; Future    Other orders  -     pantoprazole (PROTONIX) 40 MG tablet; Take 1 tablet by mouth daily         There are no Patient Instructions on file for this visit.     On this date 2/7/2023 I have spent 35 minutes reviewing previous notes, test results and face to face with the patient discussing the diagnosis and importance of compliance with the treatment plan as well as documenting on the day of the visit.      Homer Padgett MD   2/7/23

## 2023-02-27 DIAGNOSIS — I10 ESSENTIAL HYPERTENSION: ICD-10-CM

## 2023-02-27 DIAGNOSIS — I50.31 DIASTOLIC CHF, ACUTE (HCC): ICD-10-CM

## 2023-02-27 DIAGNOSIS — E78.5 HYPERLIPIDEMIA LDL GOAL <100: ICD-10-CM

## 2023-02-28 LAB
ALBUMIN SERPL-MCNC: 4.4 G/DL (ref 3.5–5.2)
ALP BLD-CCNC: 58 U/L (ref 35–104)
ALT SERPL-CCNC: 17 U/L (ref 0–32)
ANION GAP SERPL CALCULATED.3IONS-SCNC: 9 MMOL/L (ref 7–16)
AST SERPL-CCNC: 20 U/L (ref 0–31)
BILIRUB SERPL-MCNC: 0.3 MG/DL (ref 0–1.2)
BUN BLDV-MCNC: 17 MG/DL (ref 6–23)
CALCIUM IONIZED: 1.52 MMOL/L (ref 1.15–1.33)
CALCIUM SERPL-MCNC: 10.3 MG/DL (ref 8.6–10.2)
CHLORIDE BLD-SCNC: 102 MMOL/L (ref 98–107)
CO2: 26 MMOL/L (ref 22–29)
CREAT SERPL-MCNC: 0.7 MG/DL (ref 0.5–1)
GFR SERPL CREATININE-BSD FRML MDRD: >60 ML/MIN/1.73
GLUCOSE BLD-MCNC: 93 MG/DL (ref 74–99)
PARATHYROID HORMONE INTACT: 60 PG/ML (ref 15–65)
POTASSIUM SERPL-SCNC: 4.3 MMOL/L (ref 3.5–5)
SODIUM BLD-SCNC: 137 MMOL/L (ref 132–146)
TOTAL PROTEIN: 7.3 G/DL (ref 6.4–8.3)

## 2023-03-02 ENCOUNTER — OFFICE VISIT (OUTPATIENT)
Dept: CARDIOLOGY CLINIC | Age: 83
End: 2023-03-02
Payer: MEDICARE

## 2023-03-02 VITALS
BODY MASS INDEX: 38.62 KG/M2 | WEIGHT: 218 LBS | RESPIRATION RATE: 16 BRPM | HEIGHT: 63 IN | DIASTOLIC BLOOD PRESSURE: 80 MMHG | SYSTOLIC BLOOD PRESSURE: 126 MMHG | HEART RATE: 66 BPM

## 2023-03-02 DIAGNOSIS — I25.10 CORONARY ARTERY DISEASE INVOLVING NATIVE CORONARY ARTERY OF NATIVE HEART WITHOUT ANGINA PECTORIS: Primary | ICD-10-CM

## 2023-03-02 PROCEDURE — 93000 ELECTROCARDIOGRAM COMPLETE: CPT | Performed by: INTERNAL MEDICINE

## 2023-03-02 PROCEDURE — 99214 OFFICE O/P EST MOD 30 MIN: CPT | Performed by: INTERNAL MEDICINE

## 2023-03-02 PROCEDURE — 1123F ACP DISCUSS/DSCN MKR DOCD: CPT | Performed by: INTERNAL MEDICINE

## 2023-03-02 PROCEDURE — 3074F SYST BP LT 130 MM HG: CPT | Performed by: INTERNAL MEDICINE

## 2023-03-02 PROCEDURE — 3079F DIAST BP 80-89 MM HG: CPT | Performed by: INTERNAL MEDICINE

## 2023-03-02 NOTE — PROGRESS NOTES
[facility] Cardiology Progress Note  Dr. Kalyani Murcia      Referring Physician: Alyssa Garces MD  CHIEF COMPLAINT:   Chief Complaint   Patient presents with    Coronary Artery Disease     6 month        HISTORY OF PRESENT ILLNESS:    Patient is 81year old female with history of coronary artery disease, hyperlipidemia, diabetes mellitus, GERD, diastolic CHF, pulmonary embolism, is here for follow-up appointment. Shortness of breath is at baseline, patient denies any chest pain, no lightheadedness, no dizziness, no palpitations, no pedal edema, no PND, no orthopnea, no syncope, no presyncopal episodes. Past Medical History:   Diagnosis Date    Arthritis     shoulders, hips and knees, fingers    Bronchitis     CAD (coronary artery disease)     some blockage, being treated with aspirin; Dr. Ayde Wills last seen 1/3/22    GERD (gastroesophageal reflux disease)     Hyperlipidemia     Hypertension     Sinus infection 06/22/2022    LD of doxycline- 7/2/22         Past Surgical History:   Procedure Laterality Date    APPENDECTOMY      APPENDECTOMY      BLADDER SURGERY      BLADDER SUSPENSION      CARDIAC SURGERY      CATARACT EXTRACTION Left 07/01/2022    CHOLECYSTECTOMY      CHOLECYSTECTOMY      COLONOSCOPY  11/29/2012    diverticulosis, hemorrhoid    COLONOSCOPY  09/2015    CORONARY ANGIOPLASTY  10/11/2019    Angioplasty LAD by Dr Cecy Uriostegui  06/17/2019    Bypass x4.   [facility].  LIMB-LAD, SVG-OM1 SVG-OM2 SVG-RCA; Dr. Ayde Wills- last seen 1/3/22    CYST REMOVAL      back    DILATION AND CURETTAGE OF UTERUS      HYSTERECTOMY (CERVIX STATUS UNKNOWN)      INTRACAPSULAR CATARACT EXTRACTION Left 07/13/2022    CATARACT EXTRACTION WITH INTRAOCULAR LENS IMPLANT LEFT EYE performed by Rebeca Feliz DO at [address] [place] [address]         Current Outpatient Medications   Medication Sig Dispense Refill    pantoprazole (PROTONIX) 40 MG tablet Take 1 tablet by mouth daily 90 tablet 0    montelukast (SINGULAIR) 10 MG tablet Take 1 tablet by mouth daily 90 tablet 0    metoprolol succinate (TOPROL XL) 50 MG extended release tablet Take 1 tablet by mouth 2 times daily 180 tablet 0    loratadine (CLARITIN) 10 MG tablet Take 1 tablet by mouth daily 90 tablet 0    isosorbide mononitrate (IMDUR) 30 MG extended release tablet Take 1 tablet by mouth daily am 90 tablet 0    spironolactone (ALDACTONE) 25 MG tablet Take 1 tablet by mouth daily 30 tablet 0    Evolocumab (REPATHA SURECLICK) 832 MG/ML SOAJ inject 1 milliliter subcutaneously EVERY 2 WEEKS 2 mL 5    Cholecalciferol (VITAMIN D3) 125 MCG (5000 UT) TABS Take 1 tablet by mouth daily 90 tablet 0    aspirin 325 MG tablet Take 325 mg by mouth daily      furosemide (LASIX) 20 MG tablet Take 1 tablet by mouth daily 90 tablet 0    diclofenac sodium (VOLTAREN) 1 % GEL Apply 4 g topically 2 times daily (Patient taking differently: Apply 4 g topically 2 times daily as needed) 100 g 1    acetaminophen (TYLENOL) 650 MG extended release tablet Take 650 mg by mouth 2 times daily as needed       mometasone (NASONEX) 50 MCG/ACT nasal spray 1 spray by Each Nostril route as needed       Albuterol Sulfate (PROAIR HFA IN) Inhale 2 puffs into the lungs 4 times daily      azelastine (ASTELIN) 0.1 % nasal spray 1 spray by Nasal route 2 times daily Use in each nostril as directed      ibuprofen (ADVIL;MOTRIN) 200 MG tablet Take 200 mg by mouth every 6 hours as needed for Pain Dr. Viviann Goodell anticoagulation      nitroGLYCERIN (NITROSTAT) 0.4 MG SL tablet Place 0.4 mg under the tongue every 5 minutes as needed for Chest pain up to max of 3 total doses. If no relief after 1 dose, call 911. No current facility-administered medications for this visit.          Allergies as of 03/02/2023 - Fully Reviewed 03/02/2023   Allergen Reaction Noted    Statins [statins] Swelling and Rash 11/28/2012    Adhesive tape  09/09/2015    Duricef [cefadroxil]  11/28/2012    Gabapentin  11/28/2012    Oxaprozin  11/28/2012 Sulfa antibiotics Rash 2015       Social History     Socioeconomic History    Marital status:      Spouse name: Not on file    Number of children: Not on file    Years of education: Not on file    Highest education level: Not on file   Occupational History    Not on file   Tobacco Use    Smoking status: Former     Packs/day: 1.00     Years: 37.00     Pack years: 37.00     Types: Cigarettes     Start date: 57     Quit date:      Years since quittin.1    Smokeless tobacco: Never   Vaping Use    Vaping Use: Never used   Substance and Sexual Activity    Alcohol use: No    Drug use: No    Sexual activity: Not on file   Other Topics Concern    Not on file   Social History Narrative    Not on file     Social Determinants of Health     Financial Resource Strain: Low Risk     Difficulty of Paying Living Expenses: Not hard at all   Food Insecurity: No Food Insecurity    Worried About [id number] [employer] in the Last Year: Never true    [address] [employer] in the Last Year: Never true   Transportation Needs: Unknown    Lack of Transportation (Medical): Not on file    Lack of Transportation (Non-Medical):  No   Physical Activity: Not on file   Stress: Not on file   Social Connections: Not on file   Intimate Partner Violence: Not on file   Housing Stability: Unknown    Unable to Pay for Housing in the Last Year: Not on file    Number of Places Lived in the Last Year: Not on file    Unstable Housing in the Last Year: No       Family History   Problem Relation Age of Onset    Cancer Mother     Stroke Father        REVIEW OF SYSTEMS:   CONSTITUTIONAL:  negative for  fevers, chills, sweats and fatigue  HEENT:  negative for  tinnitus, earaches, nasal congestion and epistaxis  RESPIRATORY:  negative for  dry cough, cough with sputum, wheezing and hemoptysis  GASTROINTESTINAL:  negative for nausea, vomiting, diarrhea, constipation, pruritus and jaundice  HEMATOLOGIC/LYMPHATIC:  negative for easy bruising, bleeding, lymphadenopathy and petechiae  ENDOCRINE:  negative for heat intolerance, cold intolerance, tremor, hair loss and diabetic symptoms including neither polyuria nor polydipsia nor blurred vision  MUSCULOSKELETAL: Back and leg pain  NEUROLOGICAL:  negative for memory problems, speech problems, visual disturbance, dysphagia, weakness and numbness      PHYSICAL EXAM:   CONSTITUTIONAL:  awake, alert, cooperative, no apparent distress, and appears stated age  HEENT:  Moist and pink mucous membranes, normocephalic, without obvious abnormality, atraumatic  NECK:  Supple, symmetrical, trachea midline, no adenopathy, thyroid symmetric, not enlarged and no tenderness, skin normal  LUNGS:  No increased work of breathing, good air exchange, clear to auscultation bilaterally, no crackles or wheezing  CARDIOVASCULAR:  Normal apical impulse, regular rate and rhythm, normal S1 and S2, no S3 or S4,no murmur,and no pedal edema, no carotid bruit, no JVD, no pulsating masses. ABDOMEN:soft, nontender, no hepatomegaly, no splenomegaly, bowel sounds positive. CHEST:expands symmetrically, nontender to palpation  MUSCULOSKELETAL:  No clubbing, no cyanosis, no pedal edema,there is no redness, warmth, or swelling of the joints  full range of motion noted. NEUROLOGIC:  Alert, awake, oriented ×3.   SKIN: no bruises, no bleeding, normal skin color, texture, turgor and no redness, warmth, or swelling      /80   Pulse 66   Resp 16   Ht 5' 3\" (1.6 m)   Wt 218 lb (98.9 kg)   BMI 38.62 kg/m²     DATA:   I personally reviewed the visit EKG with the following interpretation: Sinus rhythm, low voltage QRS, normal axis, first-degree AV block    EKG 8/11/2022, sinus rhythm, possible old anteroseptal wall MI age undetermined, low voltage QRS, nonspecific T wave changes    EKG 1/3/2022, sinus rhythm, old anterior wall MI age undetermined, nonspecific T wave changes    EKG 5/28/21  Sinus bradycardia, low voltage QRS, possible old anteroseptal wall MI age undetermined    ECHO: 7/3/19 Summary   Left ventricle grossly normal in size. Normal LV segmental wall motion. Normal left ventricular wall thickness. Estimated left ventricular ejection fraction is 67±5%. <50% criteria for diastolic dysfunction. The LAESV Index is <34ml/m2. Normal right ventricular size and function   Physiologic and/or trace mitral regurgitation is present. The tricuspid valve appears structurally normal.   RVSP is 30 mmHg. Physiologic and/or trace tricuspid regurgitation. There is a small pericardial effusion. Left pleural effusion. Technically fair quality study. No comparison study available. Suggest clinical correlation. Stress Test:5/13/2019) Clinical: Negative for Regadenoson induced angina. ECG: Negative for Regadenoson induced ischemia. Nuclear scan: a. Moderate size, severely intense, reversible ischemia involving the anterior wall and apex. b. Normal wall motion  c. Normal ejection fraction. Angiography: 10/11/19 1. Unsuccessful attempt to heavily calcified lesion of left circumflex  artery and diagonal branch. 2.  Totally occluded LAD with patent LIMA to LAD. 3.  Significant calcified lesion at the proximal first diagonal branch. 4.  Significant disease involving OM1, OM2 and borderline lesion  involving the proximal circumflex artery and significant lesion  involving the distal left circumflex artery. 5.  Totally occluded RCA. 6.  Totally occluded SVG graft to OM1, OM2.  7.  Totally occluded SVG graft to RCA. 8.  Patent LIMA to distal LAD with mildly to moderately diffusely  diseased LAD distal to LIMA to LAD anastomosis. CABG - (6/17/2019) Coronary artery bypass grafting times four, LIMA to the LAD, sequential vein bypass graft to the first and second obtuse marginal artery. Saphenous vein bypass graft to the posterior descending artery.     Cardiology Labs: BMP:    Lab Results   Component Value Date/Time     02/28/2023 08:34 AM    K 4.3 02/28/2023 08:34 AM     02/28/2023 08:34 AM    CO2 26 02/28/2023 08:34 AM    BUN 17 02/28/2023 08:34 AM    CREATININE 0.7 02/28/2023 08:34 AM     CMP:    Lab Results   Component Value Date/Time     02/28/2023 08:34 AM    K 4.3 02/28/2023 08:34 AM     02/28/2023 08:34 AM    CO2 26 02/28/2023 08:34 AM    BUN 17 02/28/2023 08:34 AM    CREATININE 0.7 02/28/2023 08:34 AM    PROT 7.3 02/28/2023 08:34 AM     CBC:    Lab Results   Component Value Date/Time    WBC 6.8 08/19/2022 08:02 AM    RBC 4.59 08/19/2022 08:02 AM    HGB 14.3 08/19/2022 08:02 AM    HCT 44.6 08/19/2022 08:02 AM    MCV 97.2 08/19/2022 08:02 AM    RDW 12.4 08/19/2022 08:02 AM     08/19/2022 08:02 AM     PT/INR:  No results found for: PTINR  PT/INR Warfarin:  No components found for: PTPATWAR, PTINRWAR  PTT:    Lab Results   Component Value Date/Time    APTT 37.7 10/09/2019 10:09 AM     PTT Heparin:  No components found for: APTTHEP  Magnesium:  No results found for: MG  TSH:    Lab Results   Component Value Date/Time    TSH 1.300 08/29/2022 10:24 AM     TROPONIN:  No components found for: TROP  BNP:  No results found for: BNP  FASTING LIPID PANEL:    Lab Results   Component Value Date/Time    CHOL 163 02/01/2023 08:47 AM    HDL 47 02/01/2023 08:47 AM    TRIG 127 02/01/2023 08:47 AM     No orders to display     I have personally reviewed the laboratory, cardiac diagnostic and radiographic testing as outlined above:      IMPRESSION:  1.  CAD: Status post CABG with LIMA to LAD sequential SVG to M1 and M2, SVG to right PDA, had cardiac catheterization on 10/11/2019 with the following findings:   *  Unsuccessful attempt to heavily calcified lesion of left circumflex artery and diagonal branch. *  Totally occluded LAD with patent LIMA to LAD. *  Significant calcified lesion at the proximal first diagonal branch.   *  Significant disease involving OM1, OM2 and borderline lesion involving the proximal circumflex artery and significant lesion involving the distal left circumflex artery. *  Totally occluded RCA. *  Totally occluded SVG graft to OM1, OM2.  *  Totally occluded SVG graft to RCA. *  Patent LIMA to distal LAD with mildly to moderately diffusely diseased LAD distal to LIMA to LAD anastomosis. Was referred to [facility] (formerly [facility] for second opinion, continuing medical therapy was advised, will continue current treatment  2. Hypertension: Controlled, continue current treatment. 3.  Hyperlipidemi: On Repatha  4. Personal history of pulmonary embolism       RECOMMENDATIONS:   1. Continue current treatment  2. Preventive cardiology: Low-salt, low-cholesterol diet, daily exercise, total cholesterol of less than 200, LDL of less than 70, were all advised. 3.  Follow-up with Dr. Eriberto Franklin as scheduled  4. Follow-up with Dr. Linda Khan in 6 months, sooner if symptomatic for any reason    I have reviewed my findings and recommendations with patient    Electronically signed by Felipe Molina MD on 3/2/2023 at 12:42 PM  NOTE: This report was transcribed using voice recognition software.  Every effort was made to ensure accuracy; however, inadvertent computerized transcription errors may be present

## 2023-03-07 ENCOUNTER — OFFICE VISIT (OUTPATIENT)
Dept: PRIMARY CARE CLINIC | Age: 83
End: 2023-03-07
Payer: MEDICARE

## 2023-03-07 VITALS
SYSTOLIC BLOOD PRESSURE: 110 MMHG | TEMPERATURE: 96.9 F | DIASTOLIC BLOOD PRESSURE: 62 MMHG | WEIGHT: 220 LBS | HEIGHT: 63 IN | OXYGEN SATURATION: 96 % | BODY MASS INDEX: 38.98 KG/M2 | HEART RATE: 58 BPM

## 2023-03-07 DIAGNOSIS — I50.31 DIASTOLIC CHF, ACUTE (HCC): Primary | ICD-10-CM

## 2023-03-07 DIAGNOSIS — E83.52 HYPERCALCEMIA: ICD-10-CM

## 2023-03-07 DIAGNOSIS — I25.728 CORONARY ARTERY DISEASE OF AUTOLOGOUS BYPASS GRAFT WITH STABLE ANGINA PECTORIS (HCC): ICD-10-CM

## 2023-03-07 DIAGNOSIS — I10 PRIMARY HYPERTENSION: ICD-10-CM

## 2023-03-07 DIAGNOSIS — I10 ESSENTIAL HYPERTENSION: ICD-10-CM

## 2023-03-07 PROCEDURE — 3074F SYST BP LT 130 MM HG: CPT | Performed by: INTERNAL MEDICINE

## 2023-03-07 PROCEDURE — 3078F DIAST BP <80 MM HG: CPT | Performed by: INTERNAL MEDICINE

## 2023-03-07 PROCEDURE — 1123F ACP DISCUSS/DSCN MKR DOCD: CPT | Performed by: INTERNAL MEDICINE

## 2023-03-07 PROCEDURE — 99213 OFFICE O/P EST LOW 20 MIN: CPT | Performed by: INTERNAL MEDICINE

## 2023-03-07 NOTE — PROGRESS NOTES
Chief Complaint   Patient presents with    Follow-up     1 month follow up. HPI:  Patient is here for follow-up   Patient feels well when shopping in [employer]  was walking for 2 hours got tired of the end but normal acute dyspnea. Patient took spironolactone 25 mg tablet for 3 weeks noticed dry mouth at night and feeling dizzy she stopped it felt better, she is still on furosemide 20 mg tablet daily  Lab work showed serum calcium 10.3 ionized calcium 1.52. Patient is currently off vitamin D supplements  LDL 91 she is currently on Repatha  Patient had a cardiac evaluation by Dr. Mu Hicks, I reviewed it    Past Medical History, Surgical History, and Family History has been reviewed and updated.     Review of Systems:  Constitutional:  No fever, no fatigue, no chills, no headaches, no weight change  Dermatology:  No rash, no mole, no dry or sensitive skin  ENT:  No cough, no sore throat, no sinus pain, no runny nose, no ear pain  Cardiology:  No chest pain, no palpitations, no leg edema, no shortness of breath, no PND  Gastroenterology:  No dysphagia, no abdominal pain, no nausea, no vomiting, no constipation, no diarrhea, no heartburn  Musculoskeletal:  No joint pain, no leg cramps, no back pain, no muscle aches  Respiratory:  No shortness of breath, no orthopnea, no wheezing, no PINTO, no hemoptysis  Urology:  No blood in the urine, no urinary frequency, no urinary incontinence, no urinary urgency, no nocturia, no dysuria    Vitals:    03/07/23 1006   BP: 110/62   Pulse: 58   Temp: 96.9 °F (36.1 °C)   TempSrc: Temporal   SpO2: 96%   Weight: 220 lb (99.8 kg)   Height: 5' 3\" (1.6 m)       General:  Patient alert and oriented x 3, NAD, pleasant  HEENT:  Atraumatic, normocephalic, PERRLA, EOMI, clear conjunctiva, TMs clear, nose-clear, throat - no erythema  Neck:  Supple, no goiter, no carotid bruits, no LAD  Lungs:  CTA   Heart:  RRR, no murmurs, gallops or rubs  Abdomen:  Soft/nt/nd, + bowel sounds  Lymph node examination: unremarkable  Neurological exam : unremarkable  Extremities:  No clubbing, cyanosis or edema  Skin: unremarkable    Hemoglobin A1C   Date Value Ref Range Status   05/27/2020 6.0 % Final     Cholesterol, Total   Date Value Ref Range Status   02/01/2023 163 0 - 199 mg/dL Final     Triglycerides   Date Value Ref Range Status   02/01/2023 127 0 - 149 mg/dL Final     HDL   Date Value Ref Range Status   02/01/2023 47 >40 mg/dL Final     LDL Calculated   Date Value Ref Range Status   02/01/2023 91 0 - 99 mg/dL Final     VLDL Cholesterol Calculated   Date Value Ref Range Status   02/01/2023 25 mg/dL Final     Sodium   Date Value Ref Range Status   02/28/2023 137 132 - 146 mmol/L Final     Potassium   Date Value Ref Range Status   02/28/2023 4.3 3.5 - 5.0 mmol/L Final     Chloride   Date Value Ref Range Status   02/28/2023 102 98 - 107 mmol/L Final     CO2   Date Value Ref Range Status   02/28/2023 26 22 - 29 mmol/L Final     BUN   Date Value Ref Range Status   02/28/2023 17 6 - 23 mg/dL Final     Creatinine   Date Value Ref Range Status   02/28/2023 0.7 0.5 - 1.0 mg/dL Final     Glucose   Date Value Ref Range Status   02/28/2023 93 74 - 99 mg/dL Final     Calcium   Date Value Ref Range Status   02/28/2023 10.3 (H) 8.6 - 10.2 mg/dL Final     Total Protein   Date Value Ref Range Status   02/28/2023 7.3 6.4 - 8.3 g/dL Final     Albumin   Date Value Ref Range Status   02/28/2023 4.4 3.5 - 5.2 g/dL Final     Total Bilirubin   Date Value Ref Range Status   02/28/2023 0.3 0.0 - 1.2 mg/dL Final     Alkaline Phosphatase   Date Value Ref Range Status   02/28/2023 58 35 - 104 U/L Final     AST   Date Value Ref Range Status   02/28/2023 20 0 - 31 U/L Final     ALT   Date Value Ref Range Status   02/28/2023 17 0 - 32 U/L Final     Est, Glom Filt Rate   Date Value Ref Range Status   02/28/2023 >60 >=60 mL/min/1.73 Final     Comment:     Pediatric calculator link  [email or web address]_[email or web address]  Effective Oct 3, 2022  These results are not intended for use in patients  <25years of age. eGFR results are calculated without  a race factor using the 2021 CKD-EPI equation. Careful  clinical correlation is recommended, particularly when  comparing to results calculated using previous equations. The CKD-EPI equation is less accurate in patients with  extremes of muscle mass, extra-renal metabolism of  creatinine, excessive creatinine ingestion, or following  therapy that affects renal tubular secretion. GFR    Date Value Ref Range Status   08/19/2022 >60  Final        No results found. Assessment/Plan:   Hypercalcemia, repeat total and ionized calcium levels check PTH serum protein electrophoresis  Diastolic CHF controlled.   Discontinue spironolactone  Essential hypertension controlled  Coronary artery disease status post CABG controlled  Primary hypertension controlled    Outpatient Encounter Medications as of 3/7/2023   Medication Sig Dispense Refill    pantoprazole (PROTONIX) 40 MG tablet Take 1 tablet by mouth daily 90 tablet 0    montelukast (SINGULAIR) 10 MG tablet Take 1 tablet by mouth daily 90 tablet 0    metoprolol succinate (TOPROL XL) 50 MG extended release tablet Take 1 tablet by mouth 2 times daily 180 tablet 0    loratadine (CLARITIN) 10 MG tablet Take 1 tablet by mouth daily 90 tablet 0    isosorbide mononitrate (IMDUR) 30 MG extended release tablet Take 1 tablet by mouth daily am 90 tablet 0    Evolocumab (REPATHA SURECLICK) 172 MG/ML SOAJ inject 1 milliliter subcutaneously EVERY 2 WEEKS 2 mL 5    aspirin 325 MG tablet Take 325 mg by mouth daily      furosemide (LASIX) 20 MG tablet Take 1 tablet by mouth daily 90 tablet 0    diclofenac sodium (VOLTAREN) 1 % GEL Apply 4 g topically 2 times daily (Patient taking differently: Apply 4 g topically 2 times daily as needed) 100 g 1    acetaminophen (TYLENOL) 650 MG extended release tablet Take 650 mg by mouth 2 times daily as needed       mometasone (NASONEX) 50 MCG/ACT nasal spray 1 spray by Each Nostril route as needed       Albuterol Sulfate (PROAIR HFA IN) Inhale 2 puffs into the lungs 4 times daily      azelastine (ASTELIN) 0.1 % nasal spray 1 spray by Nasal route 2 times daily Use in each nostril as directed      ibuprofen (ADVIL;MOTRIN) 200 MG tablet Take 200 mg by mouth every 6 hours as needed for Pain Dr. Jessie Pettit anticoagulation      nitroGLYCERIN (NITROSTAT) 0.4 MG SL tablet Place 0.4 mg under the tongue every 5 minutes as needed for Chest pain up to max of 3 total doses. If no relief after 1 dose, call 911. [DISCONTINUED] spironolactone (ALDACTONE) 25 MG tablet Take 1 tablet by mouth daily 30 tablet 0    [DISCONTINUED] Cholecalciferol (VITAMIN D3) 125 MCG (5000 UT) TABS Take 1 tablet by mouth daily (Patient not taking: Reported on 3/7/2023) 90 tablet 0     No facility-administered encounter medications on file as of 3/7/2023. Blessing Esteves was seen today for follow-up. Diagnoses and all orders for this visit:    Diastolic CHF, acute ([address]  -     Comprehensive Metabolic Panel; Future    Essential hypertension  -     Comprehensive Metabolic Panel; Future    Hypercalcemia  -     Calcium, Ionized; Future  -     Comprehensive Metabolic Panel; Future  -     PTH, Intact; Future  -     Electrophoresis Protein, Serum; Future    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 ([address]    Primary hypertension         There are no Patient Instructions on file for this visit. On this date 3/7/2023 I have spent 25 minutes reviewing previous notes, test results and face to face with the patient discussing the diagnosis and importance of compliance with the treatment plan as well as documenting on the day of the visit.       Carolina Little MD   3/7/23

## 2023-03-27 DIAGNOSIS — E83.52 HYPERCALCEMIA: ICD-10-CM

## 2023-03-27 DIAGNOSIS — I50.31 DIASTOLIC CHF, ACUTE (HCC): ICD-10-CM

## 2023-03-27 DIAGNOSIS — I10 ESSENTIAL HYPERTENSION: ICD-10-CM

## 2023-03-27 LAB
ALBUMIN SERPL-MCNC: 4.4 G/DL (ref 3.5–5.2)
ALP SERPL-CCNC: 57 U/L (ref 35–104)
ALT SERPL-CCNC: 23 U/L (ref 0–32)
ANION GAP SERPL CALCULATED.3IONS-SCNC: 13 MMOL/L (ref 7–16)
AST SERPL-CCNC: 25 U/L (ref 0–31)
BILIRUB SERPL-MCNC: 0.4 MG/DL (ref 0–1.2)
BUN SERPL-MCNC: 16 MG/DL (ref 6–23)
CA-I BLD-SCNC: 1.35 MMOL/L (ref 1.15–1.33)
CALCIUM SERPL-MCNC: 10.1 MG/DL (ref 8.6–10.2)
CHLORIDE SERPL-SCNC: 106 MMOL/L (ref 98–107)
CO2 SERPL-SCNC: 24 MMOL/L (ref 22–29)
CREAT SERPL-MCNC: 0.7 MG/DL (ref 0.5–1)
GLUCOSE SERPL-MCNC: 90 MG/DL (ref 74–99)
POTASSIUM SERPL-SCNC: 4.5 MMOL/L (ref 3.5–5)
PTH-INTACT SERPL-MCNC: 76 PG/ML (ref 15–65)
SODIUM SERPL-SCNC: 143 MMOL/L (ref 132–146)

## 2023-03-29 LAB
ALBUMIN SERPL-MCNC: 2.8 G/DL (ref 3.5–4.7)
ALPHA1 GLOB SERPL ELPH-MCNC: 0.3 G/DL (ref 0.2–0.4)
ALPHA2 GLOB SERPL ELPH-MCNC: 1.1 G/DL (ref 0.5–1)
B-GLOBULIN SERPL ELPH-MCNC: 1.3 G/DL (ref 0.8–1.3)
ELECTROPHORESIS: ABNORMAL
GAMMA GLOB SERPL ELPH-MCNC: 1.4 G/DL (ref 0.7–1.6)
PROT SERPL-MCNC: 7 G/DL (ref 6.4–8.3)

## 2023-04-06 ENCOUNTER — OFFICE VISIT (OUTPATIENT)
Dept: PRIMARY CARE CLINIC | Age: 83
End: 2023-04-06

## 2023-04-06 VITALS
HEART RATE: 58 BPM | DIASTOLIC BLOOD PRESSURE: 72 MMHG | SYSTOLIC BLOOD PRESSURE: 136 MMHG | WEIGHT: 219.3 LBS | TEMPERATURE: 97.5 F | BODY MASS INDEX: 38.86 KG/M2 | OXYGEN SATURATION: 92 % | HEIGHT: 63 IN

## 2023-04-06 DIAGNOSIS — I50.31 DIASTOLIC CHF, ACUTE (HCC): ICD-10-CM

## 2023-04-06 DIAGNOSIS — E21.3 HYPERPARATHYROIDISM (HCC): Primary | ICD-10-CM

## 2023-04-06 DIAGNOSIS — I10 ESSENTIAL HYPERTENSION: ICD-10-CM

## 2023-04-06 DIAGNOSIS — J31.0 CHRONIC RHINITIS: ICD-10-CM

## 2023-04-06 DIAGNOSIS — I25.728 CORONARY ARTERY DISEASE OF AUTOLOGOUS BYPASS GRAFT WITH STABLE ANGINA PECTORIS (HCC): ICD-10-CM

## 2023-04-06 DIAGNOSIS — E66.01 SEVERE OBESITY (BMI 35.0-39.9) WITH COMORBIDITY (HCC): ICD-10-CM

## 2023-04-06 DIAGNOSIS — E83.52 HYPERCALCEMIA: ICD-10-CM

## 2023-04-06 PROBLEM — I26.99 PULMONARY EMBOLISM (HCC): Status: RESOLVED | Noted: 2019-07-04 | Resolved: 2023-04-06

## 2023-04-06 RX ORDER — PANTOPRAZOLE SODIUM 40 MG/1
40 TABLET, DELAYED RELEASE ORAL DAILY
Qty: 90 TABLET | Refills: 0 | Status: CANCELLED | OUTPATIENT
Start: 2023-04-06

## 2023-04-06 RX ORDER — ISOSORBIDE MONONITRATE 30 MG/1
30 TABLET, EXTENDED RELEASE ORAL DAILY
Qty: 90 TABLET | Refills: 0 | Status: CANCELLED | OUTPATIENT
Start: 2023-04-06

## 2023-04-06 RX ORDER — LORATADINE 10 MG/1
10 TABLET ORAL DAILY
Qty: 90 TABLET | Refills: 0 | Status: CANCELLED | OUTPATIENT
Start: 2023-04-06

## 2023-04-06 RX ORDER — METOPROLOL SUCCINATE 50 MG/1
50 TABLET, EXTENDED RELEASE ORAL 2 TIMES DAILY
Qty: 180 TABLET | Refills: 0 | Status: CANCELLED | OUTPATIENT
Start: 2023-04-06

## 2023-04-06 RX ORDER — FUROSEMIDE 20 MG/1
20 TABLET ORAL DAILY
COMMUNITY

## 2023-04-06 RX ORDER — MONTELUKAST SODIUM 10 MG/1
10 TABLET ORAL DAILY
Qty: 90 TABLET | Refills: 0 | Status: CANCELLED | OUTPATIENT
Start: 2023-04-06

## 2023-04-06 RX ORDER — FUROSEMIDE 20 MG/1
20 TABLET ORAL DAILY
Qty: 90 TABLET | Refills: 0 | Status: CANCELLED | OUTPATIENT
Start: 2023-04-06

## 2023-04-06 NOTE — PROGRESS NOTES
Value Ref Range Status   05/27/2020 6.0 % Final     Cholesterol, Total   Date Value Ref Range Status   02/01/2023 163 0 - 199 mg/dL Final     Triglycerides   Date Value Ref Range Status   02/01/2023 127 0 - 149 mg/dL Final     HDL   Date Value Ref Range Status   02/01/2023 47 >40 mg/dL Final     LDL Calculated   Date Value Ref Range Status   02/01/2023 91 0 - 99 mg/dL Final     VLDL Cholesterol Calculated   Date Value Ref Range Status   02/01/2023 25 mg/dL Final     Sodium   Date Value Ref Range Status   03/27/2023 143 132 - 146 mmol/L Final     Potassium   Date Value Ref Range Status   03/27/2023 4.5 3.5 - 5.0 mmol/L Final     Chloride   Date Value Ref Range Status   03/27/2023 106 98 - 107 mmol/L Final     CO2   Date Value Ref Range Status   03/27/2023 24 22 - 29 mmol/L Final     BUN   Date Value Ref Range Status   03/27/2023 16 6 - 23 mg/dL Final     Creatinine   Date Value Ref Range Status   03/27/2023 0.7 0.5 - 1.0 mg/dL Final     Glucose   Date Value Ref Range Status   03/27/2023 90 74 - 99 mg/dL Final     Calcium   Date Value Ref Range Status   03/27/2023 10.1 8.6 - 10.2 mg/dL Final     Total Protein   Date Value Ref Range Status   03/27/2023 7.0 6.4 - 8.3 g/dL Final     Albumin   Date Value Ref Range Status   03/27/2023 2.8 (L) 3.5 - 4.7 g/dL Final   03/27/2023 4.4 3.5 - 5.2 g/dL Final     Total Bilirubin   Date Value Ref Range Status   03/27/2023 0.4 0.0 - 1.2 mg/dL Final     Alkaline Phosphatase   Date Value Ref Range Status   03/27/2023 57 35 - 104 U/L Final     AST   Date Value Ref Range Status   03/27/2023 25 0 - 31 U/L Final     ALT   Date Value Ref Range Status   03/27/2023 23 0 - 32 U/L Final     Est, Glom Filt Rate   Date Value Ref Range Status   03/27/2023 >60 >=60 mL/min/1.73 Final     Comment:     Pediatric calculator link  [email or web address]_[email or web address]  Effective Oct 3, 2022  These results are not intended for use in patients  <25years of age.   eGFR results are

## 2023-04-17 ENCOUNTER — HOSPITAL ENCOUNTER (OUTPATIENT)
Dept: NUCLEAR MEDICINE | Age: 83
Discharge: HOME | End: 2023-04-17
Payer: MEDICARE

## 2023-04-17 DIAGNOSIS — E21.3 HYPERPARATHYROIDISM (HCC): ICD-10-CM

## 2023-04-17 DIAGNOSIS — E83.52 HYPERCALCEMIA: ICD-10-CM

## 2023-04-17 PROCEDURE — A9500 TC99M SESTAMIBI: HCPCS | Performed by: RADIOLOGY

## 2023-04-17 PROCEDURE — 78070 PARATHYROID PLANAR IMAGING: CPT

## 2023-04-17 PROCEDURE — 3430000000 HC RX DIAGNOSTIC RADIOPHARMACEUTICAL: Performed by: RADIOLOGY

## 2023-04-17 RX ORDER — TETRAKIS(2-METHOXYISOBUTYLISOCYANIDE)COPPER(I) TETRAFLUOROBORATE 1 MG/ML
20 INJECTION, POWDER, LYOPHILIZED, FOR SOLUTION INTRAVENOUS
Status: COMPLETED | OUTPATIENT
Start: 2023-04-17 | End: 2023-04-17

## 2023-04-17 RX ADMIN — Medication 20 MILLICURIE: at 12:20

## 2023-04-27 DIAGNOSIS — I25.728 CORONARY ARTERY DISEASE OF AUTOLOGOUS BYPASS GRAFT WITH STABLE ANGINA PECTORIS (HCC): ICD-10-CM

## 2023-04-28 RX ORDER — ISOSORBIDE MONONITRATE 30 MG/1
30 TABLET, EXTENDED RELEASE ORAL DAILY
Qty: 90 TABLET | Refills: 0 | Status: SHIPPED | OUTPATIENT
Start: 2023-04-28

## 2023-05-08 DIAGNOSIS — E21.3 HYPERPARATHYROIDISM (HCC): ICD-10-CM

## 2023-05-08 DIAGNOSIS — E83.52 HYPERCALCEMIA: ICD-10-CM

## 2023-05-08 LAB
CA-I BLD-SCNC: 1.52 MMOL/L (ref 1.15–1.33)
PHOSPHATE SERPL-MCNC: 2.4 MG/DL (ref 2.5–4.5)

## 2023-05-18 ENCOUNTER — OFFICE VISIT (OUTPATIENT)
Dept: PRIMARY CARE CLINIC | Age: 83
End: 2023-05-18
Payer: MEDICARE

## 2023-05-18 VITALS
HEART RATE: 60 BPM | DIASTOLIC BLOOD PRESSURE: 68 MMHG | TEMPERATURE: 97.7 F | SYSTOLIC BLOOD PRESSURE: 112 MMHG | HEIGHT: 63 IN | OXYGEN SATURATION: 98 % | WEIGHT: 220.3 LBS | BODY MASS INDEX: 39.04 KG/M2

## 2023-05-18 DIAGNOSIS — I50.31 DIASTOLIC CHF, ACUTE (HCC): ICD-10-CM

## 2023-05-18 DIAGNOSIS — I10 ESSENTIAL HYPERTENSION: ICD-10-CM

## 2023-05-18 DIAGNOSIS — E21.3 HYPERPARATHYROIDISM (HCC): Primary | ICD-10-CM

## 2023-05-18 DIAGNOSIS — E83.52 HYPERCALCEMIA: ICD-10-CM

## 2023-05-18 DIAGNOSIS — E78.5 HYPERLIPIDEMIA LDL GOAL <100: ICD-10-CM

## 2023-05-18 DIAGNOSIS — J31.0 CHRONIC RHINITIS: ICD-10-CM

## 2023-05-18 PROBLEM — E66.9 OBESITY, CLASS II, BMI 35-39.9: Status: ACTIVE | Noted: 2019-11-13

## 2023-05-18 PROBLEM — I25.119 CORONARY ARTERY DISEASE INVOLVING NATIVE CORONARY ARTERY OF NATIVE HEART WITH ANGINA PECTORIS (HCC): Status: ACTIVE | Noted: 2019-11-13

## 2023-05-18 PROBLEM — R06.09 DYSPNEA ON EXERTION: Status: ACTIVE | Noted: 2019-11-13

## 2023-05-18 PROBLEM — R07.89 OTHER CHEST PAIN: Status: ACTIVE | Noted: 2019-07-02

## 2023-05-18 PROBLEM — E66.812 OBESITY, CLASS II, BMI 35-39.9: Status: ACTIVE | Noted: 2019-11-13

## 2023-05-18 PROCEDURE — 3078F DIAST BP <80 MM HG: CPT | Performed by: INTERNAL MEDICINE

## 2023-05-18 PROCEDURE — 99213 OFFICE O/P EST LOW 20 MIN: CPT | Performed by: INTERNAL MEDICINE

## 2023-05-18 PROCEDURE — 3074F SYST BP LT 130 MM HG: CPT | Performed by: INTERNAL MEDICINE

## 2023-05-18 PROCEDURE — 1123F ACP DISCUSS/DSCN MKR DOCD: CPT | Performed by: INTERNAL MEDICINE

## 2023-05-18 RX ORDER — LORATADINE 10 MG/1
10 TABLET ORAL DAILY
Qty: 90 TABLET | Refills: 0 | Status: SHIPPED | OUTPATIENT
Start: 2023-05-18

## 2023-05-18 RX ORDER — METOPROLOL SUCCINATE 50 MG/1
50 TABLET, EXTENDED RELEASE ORAL 2 TIMES DAILY
Qty: 180 TABLET | Refills: 0 | Status: SHIPPED | OUTPATIENT
Start: 2023-05-18

## 2023-05-18 RX ORDER — MONTELUKAST SODIUM 10 MG/1
10 TABLET ORAL DAILY
Qty: 90 TABLET | Refills: 0 | Status: SHIPPED | OUTPATIENT
Start: 2023-05-18

## 2023-05-18 RX ORDER — PANTOPRAZOLE SODIUM 40 MG/1
40 TABLET, DELAYED RELEASE ORAL DAILY
Qty: 90 TABLET | Refills: 0 | Status: SHIPPED | OUTPATIENT
Start: 2023-05-18

## 2023-05-18 NOTE — PROGRESS NOTES
Chief Complaint   Patient presents with    Thyroid Problem     Follow up to review thyroid scan       HPI:  Patient is here for follow-up   Patient feels well overall, feels tired around 8:00 in the evening occasional muscle ache in the morning. Repeat lab work showed serum calcium 10.1, ionized calcium 1.5, elevated phosphorus 2.4 low  Parathyroid nuclear scan showed:  Asymmetric activity is seen at the left thyroid bed, which could reflect the  known left thyroid nodule; a superimposed parathyroid adenoma cannot be  excluded. Directed ultrasound could be performed for further assessment as  warranted. Past Medical History, Surgical History, and Family History has been reviewed and updated.     Review of Systems:  Constitutional:  No fever, no fatigue, no chills, no headaches, no weight change  Dermatology:  No rash, no mole, no dry or sensitive skin  ENT:  No cough, no sore throat, no sinus pain, no runny nose, no ear pain  Cardiology:  No chest pain, no palpitations, no leg edema, no shortness of breath, no PND  Gastroenterology:  No dysphagia, no abdominal pain, no nausea, no vomiting, no constipation, no diarrhea, no heartburn  Musculoskeletal:  No joint pain, no leg cramps, no back pain, no muscle aches  Respiratory:  No shortness of breath, no orthopnea, no wheezing, no PINTO, no hemoptysis  Urology:  No blood in the urine, no urinary frequency, no urinary incontinence, no urinary urgency, no nocturia, no dysuria    Vitals:    05/18/23 0919   BP: 112/68   Site: Right Upper Arm   Position: Sitting   Cuff Size: Medium Adult   Pulse: 60   Temp: 97.7 °F (36.5 °C)   TempSrc: Temporal   SpO2: 98%   Weight: 220 lb 4.8 oz (99.9 kg)   Height: 5' 3\" (1.6 m)       General:  Patient alert and oriented x 3, NAD, pleasant  HEENT:  Atraumatic, normocephalic, PERRLA, EOMI, clear conjunctiva, TMs clear, nose-clear, throat - no erythema  Neck:  Supple, no goiter, no carotid bruits, no LAD  Lungs:  CTA   Heart:  RRR, no

## 2023-07-11 DIAGNOSIS — I50.31 DIASTOLIC CHF, ACUTE (HCC): ICD-10-CM

## 2023-07-11 DIAGNOSIS — E78.5 HYPERLIPIDEMIA LDL GOAL <100: ICD-10-CM

## 2023-07-11 DIAGNOSIS — E21.3 HYPERPARATHYROIDISM (HCC): ICD-10-CM

## 2023-07-11 DIAGNOSIS — I10 ESSENTIAL HYPERTENSION: ICD-10-CM

## 2023-07-11 DIAGNOSIS — E83.52 HYPERCALCEMIA: ICD-10-CM

## 2023-07-11 LAB — CA-I BLD-SCNC: 1.32 MMOL/L (ref 1.15–1.33)

## 2023-07-12 LAB
ALBUMIN SERPL-MCNC: 4.5 G/DL (ref 3.5–5.2)
ALP SERPL-CCNC: 57 U/L (ref 35–104)
ALT SERPL-CCNC: 16 U/L (ref 0–32)
ANION GAP SERPL CALCULATED.3IONS-SCNC: 16 MMOL/L (ref 7–16)
AST SERPL-CCNC: 19 U/L (ref 0–31)
BILIRUB SERPL-MCNC: 0.4 MG/DL (ref 0–1.2)
BUN SERPL-MCNC: 17 MG/DL (ref 6–23)
CALCIUM SERPL-MCNC: 10.4 MG/DL (ref 8.6–10.2)
CHLORIDE SERPL-SCNC: 104 MMOL/L (ref 98–107)
CHOLESTEROL, TOTAL: 131 MG/DL (ref 0–199)
CO2 SERPL-SCNC: 21 MMOL/L (ref 22–29)
CREAT SERPL-MCNC: 0.7 MG/DL (ref 0.5–1)
GLUCOSE SERPL-MCNC: 103 MG/DL (ref 74–99)
HDLC SERPL-MCNC: 41 MG/DL
LDLC SERPL CALC-MCNC: 61 MG/DL (ref 0–99)
POTASSIUM SERPL-SCNC: 4.5 MMOL/L (ref 3.5–5)
PROT SERPL-MCNC: 7.1 G/DL (ref 6.4–8.3)
SODIUM SERPL-SCNC: 141 MMOL/L (ref 132–146)
TRIGL SERPL-MCNC: 143 MG/DL (ref 0–149)
VLDLC SERPL CALC-MCNC: 29 MG/DL

## 2023-07-18 ENCOUNTER — OFFICE VISIT (OUTPATIENT)
Dept: PRIMARY CARE CLINIC | Age: 83
End: 2023-07-18
Payer: MEDICARE

## 2023-07-18 VITALS
BODY MASS INDEX: 38.91 KG/M2 | OXYGEN SATURATION: 96 % | HEART RATE: 55 BPM | DIASTOLIC BLOOD PRESSURE: 80 MMHG | HEIGHT: 63 IN | WEIGHT: 219.6 LBS | TEMPERATURE: 96.9 F | SYSTOLIC BLOOD PRESSURE: 138 MMHG

## 2023-07-18 DIAGNOSIS — E83.52 HYPERCALCEMIA: ICD-10-CM

## 2023-07-18 DIAGNOSIS — I10 ESSENTIAL HYPERTENSION: ICD-10-CM

## 2023-07-18 DIAGNOSIS — I25.728 CORONARY ARTERY DISEASE OF AUTOLOGOUS BYPASS GRAFT WITH STABLE ANGINA PECTORIS (HCC): ICD-10-CM

## 2023-07-18 DIAGNOSIS — K58.1 IRRITABLE BOWEL SYNDROME WITH CONSTIPATION: ICD-10-CM

## 2023-07-18 DIAGNOSIS — G45.4 AMNESIA, GLOBAL, TRANSIENT: ICD-10-CM

## 2023-07-18 DIAGNOSIS — G45.9 TIA (TRANSIENT ISCHEMIC ATTACK): Primary | ICD-10-CM

## 2023-07-18 DIAGNOSIS — J31.0 CHRONIC RHINITIS: ICD-10-CM

## 2023-07-18 DIAGNOSIS — I50.31 DIASTOLIC CHF, ACUTE (HCC): ICD-10-CM

## 2023-07-18 PROCEDURE — 1123F ACP DISCUSS/DSCN MKR DOCD: CPT | Performed by: INTERNAL MEDICINE

## 2023-07-18 PROCEDURE — 99215 OFFICE O/P EST HI 40 MIN: CPT | Performed by: INTERNAL MEDICINE

## 2023-07-18 PROCEDURE — 3075F SYST BP GE 130 - 139MM HG: CPT | Performed by: INTERNAL MEDICINE

## 2023-07-18 PROCEDURE — 3079F DIAST BP 80-89 MM HG: CPT | Performed by: INTERNAL MEDICINE

## 2023-07-18 RX ORDER — FUROSEMIDE 20 MG/1
20 TABLET ORAL DAILY
Qty: 90 TABLET | Refills: 0 | Status: SHIPPED | OUTPATIENT
Start: 2023-07-18

## 2023-07-18 RX ORDER — LORATADINE 10 MG/1
10 TABLET ORAL DAILY
Qty: 90 TABLET | Refills: 0 | Status: SHIPPED | OUTPATIENT
Start: 2023-07-18

## 2023-07-18 RX ORDER — METOPROLOL SUCCINATE 50 MG/1
50 TABLET, EXTENDED RELEASE ORAL 2 TIMES DAILY
Qty: 180 TABLET | Refills: 0 | Status: SHIPPED | OUTPATIENT
Start: 2023-07-18

## 2023-07-18 RX ORDER — MONTELUKAST SODIUM 10 MG/1
10 TABLET ORAL DAILY
Qty: 90 TABLET | Refills: 0 | Status: SHIPPED | OUTPATIENT
Start: 2023-07-18

## 2023-07-18 RX ORDER — PANTOPRAZOLE SODIUM 40 MG/1
40 TABLET, DELAYED RELEASE ORAL DAILY
Qty: 90 TABLET | Refills: 0 | Status: SHIPPED | OUTPATIENT
Start: 2023-07-18

## 2023-07-18 RX ORDER — ISOSORBIDE MONONITRATE 30 MG/1
30 TABLET, EXTENDED RELEASE ORAL DAILY
Qty: 90 TABLET | Refills: 0 | Status: SHIPPED | OUTPATIENT
Start: 2023-07-18

## 2023-07-18 RX ORDER — LUBIPROSTONE 8 UG/1
8 CAPSULE ORAL 2 TIMES DAILY
Qty: 60 CAPSULE | Refills: 0 | Status: SHIPPED | OUTPATIENT
Start: 2023-07-18

## 2023-07-18 ASSESSMENT — PATIENT HEALTH QUESTIONNAIRE - PHQ9
SUM OF ALL RESPONSES TO PHQ QUESTIONS 1-9: 0
SUM OF ALL RESPONSES TO PHQ9 QUESTIONS 1 & 2: 0
SUM OF ALL RESPONSES TO PHQ QUESTIONS 1-9: 0
SUM OF ALL RESPONSES TO PHQ QUESTIONS 1-9: 0
1. LITTLE INTEREST OR PLEASURE IN DOING THINGS: 0
2. FEELING DOWN, DEPRESSED OR HOPELESS: 0
SUM OF ALL RESPONSES TO PHQ QUESTIONS 1-9: 0

## 2023-07-18 NOTE — PROGRESS NOTES
Chief Complaint   Patient presents with    Follow-up     Pt is a routine F/U, with labs on file from 7/11 for review. HPI:  Patient is here for follow-up   Problem #1 patient scheduled to see endocrinologist end of August regarding hypercalcemia and hyperparathyroidism  Problem #2 patient complains of right lower quadrant discomfort and ongoing worsening constipation last bowel movement was 2 days ago small amount she has history of IBS with constipation she is currently taking prune juice has tried Linzess in the past without results  Problem #3 patient complains of an episode happened 1 month ago lasted for 5 to 10 minutes while she draw a blank cannot remember her neighbors names, many other things her son was concerned asked to take her to the emergency room but she refused she was aware of her surroundings and oriented to person however she forgot everything else there was no seizure activity no cardiopulmonary symptoms no head trauma and no headache. Memory came back and no recurrence since    Patient is compliant to medications. Past Medical History, Surgical History, and Family History has been reviewed and updated.     Review of Systems:  Constitutional:  No fever, no fatigue, no chills, no headaches, no weight change  Dermatology:  No rash, no mole, no dry or sensitive skin  ENT:  No cough, no sore throat, no sinus pain, no runny nose, no ear pain  Cardiology:  No chest pain, no palpitations, no leg edema, no shortness of breath, no PND  Gastroenterology:  No dysphagia, no abdominal pain, no nausea, no vomiting, no constipation, no diarrhea, no heartburn  Musculoskeletal:  No joint pain, no leg cramps, no back pain, no muscle aches  Respiratory:  No shortness of breath, no orthopnea, no wheezing, no PINTO, no hemoptysis  Urology:  No blood in the urine, no urinary frequency, no urinary incontinence, no urinary urgency, no nocturia, no dysuria    Vitals:    07/18/23 0906   BP: 138/80   Pulse: 55

## 2023-07-25 RX ORDER — EVOLOCUMAB 140 MG/ML
INJECTION, SOLUTION SUBCUTANEOUS
Qty: 2 ML | Refills: 5 | Status: SHIPPED | OUTPATIENT
Start: 2023-07-25

## 2023-07-30 ENCOUNTER — HOSPITAL ENCOUNTER (OUTPATIENT)
Dept: ULTRASOUND IMAGING | Age: 83
Discharge: HOME | End: 2023-07-30
Attending: INTERNAL MEDICINE
Payer: MEDICARE

## 2023-07-30 ENCOUNTER — HOSPITAL ENCOUNTER (OUTPATIENT)
Dept: MRI IMAGING | Age: 83
Discharge: HOME | End: 2023-08-01
Attending: INTERNAL MEDICINE
Payer: MEDICARE

## 2023-07-30 DIAGNOSIS — G45.4 AMNESIA, GLOBAL, TRANSIENT: ICD-10-CM

## 2023-07-30 DIAGNOSIS — G45.9 TIA (TRANSIENT ISCHEMIC ATTACK): ICD-10-CM

## 2023-07-30 PROCEDURE — 93880 EXTRACRANIAL BILAT STUDY: CPT

## 2023-07-30 PROCEDURE — 70551 MRI BRAIN STEM W/O DYE: CPT

## 2023-08-08 ENCOUNTER — OFFICE VISIT (OUTPATIENT)
Dept: PRIMARY CARE CLINIC | Age: 83
End: 2023-08-08
Payer: MEDICARE

## 2023-08-08 VITALS
SYSTOLIC BLOOD PRESSURE: 120 MMHG | DIASTOLIC BLOOD PRESSURE: 78 MMHG | HEIGHT: 63 IN | TEMPERATURE: 97.5 F | BODY MASS INDEX: 38.59 KG/M2 | OXYGEN SATURATION: 96 % | WEIGHT: 217.8 LBS | HEART RATE: 59 BPM

## 2023-08-08 DIAGNOSIS — K58.1 IRRITABLE BOWEL SYNDROME WITH CONSTIPATION: Primary | ICD-10-CM

## 2023-08-08 DIAGNOSIS — G45.4 AMNESIA, GLOBAL, TRANSIENT: ICD-10-CM

## 2023-08-08 DIAGNOSIS — I10 ESSENTIAL HYPERTENSION: ICD-10-CM

## 2023-08-08 DIAGNOSIS — J31.0 CHRONIC RHINITIS: ICD-10-CM

## 2023-08-08 DIAGNOSIS — I50.31 DIASTOLIC CHF, ACUTE (HCC): ICD-10-CM

## 2023-08-08 PROCEDURE — 1123F ACP DISCUSS/DSCN MKR DOCD: CPT | Performed by: INTERNAL MEDICINE

## 2023-08-08 PROCEDURE — 99214 OFFICE O/P EST MOD 30 MIN: CPT | Performed by: INTERNAL MEDICINE

## 2023-08-08 PROCEDURE — 3078F DIAST BP <80 MM HG: CPT | Performed by: INTERNAL MEDICINE

## 2023-08-08 PROCEDURE — 3074F SYST BP LT 130 MM HG: CPT | Performed by: INTERNAL MEDICINE

## 2023-08-08 RX ORDER — PANTOPRAZOLE SODIUM 40 MG/1
40 TABLET, DELAYED RELEASE ORAL DAILY
Qty: 90 TABLET | Refills: 0 | Status: SHIPPED | OUTPATIENT
Start: 2023-08-08

## 2023-08-08 RX ORDER — METOPROLOL SUCCINATE 50 MG/1
50 TABLET, EXTENDED RELEASE ORAL 2 TIMES DAILY
Qty: 180 TABLET | Refills: 0 | Status: SHIPPED | OUTPATIENT
Start: 2023-08-08

## 2023-08-08 RX ORDER — LUBIPROSTONE 24 UG/1
24 CAPSULE ORAL 2 TIMES DAILY WITH MEALS
Qty: 30 CAPSULE | Refills: 1 | Status: SHIPPED | OUTPATIENT
Start: 2023-08-08

## 2023-08-08 RX ORDER — MONTELUKAST SODIUM 10 MG/1
10 TABLET ORAL DAILY
Qty: 90 TABLET | Refills: 0 | Status: SHIPPED | OUTPATIENT
Start: 2023-08-08

## 2023-08-08 ASSESSMENT — PATIENT HEALTH QUESTIONNAIRE - PHQ9
1. LITTLE INTEREST OR PLEASURE IN DOING THINGS: 0
SUM OF ALL RESPONSES TO PHQ QUESTIONS 1-9: 0
SUM OF ALL RESPONSES TO PHQ9 QUESTIONS 1 & 2: 0
2. FEELING DOWN, DEPRESSED OR HOPELESS: 0
SUM OF ALL RESPONSES TO PHQ QUESTIONS 1-9: 0

## 2023-08-08 NOTE — PROGRESS NOTES
[DISCONTINUED] lubiprostone (AMITIZA) 8 MCG CAPS capsule Take 1 capsule by mouth in the morning and at bedtime 60 capsule 0     No facility-administered encounter medications on file as of 8/8/2023. Mary Ann Longoria was seen today for follow-up and other. Diagnoses and all orders for this visit:    Irritable bowel syndrome with constipation  -     lubiprostone (AMITIZA) 24 MCG capsule; Take 1 capsule by mouth 2 times daily (with meals)    Chronic rhinitis  -     montelukast (SINGULAIR) 10 MG tablet; Take 1 tablet by mouth daily    Diastolic CHF, acute (HCC)  -     metoprolol succinate (TOPROL XL) 50 MG extended release tablet; Take 1 tablet by mouth 2 times daily    Essential hypertension  -     metoprolol succinate (TOPROL XL) 50 MG extended release tablet; Take 1 tablet by mouth 2 times daily    Amnesia, global, transient    Other orders  -     pantoprazole (PROTONIX) 40 MG tablet; Take 1 tablet by mouth daily         There are no Patient Instructions on file for this visit. On this date 8/8/2023 I have spent 30 minutes reviewing previous notes, test results and face to face with the patient discussing the diagnosis and importance of compliance with the treatment plan as well as documenting on the day of the visit.       Joseph Bar MD   8/8/23

## 2023-09-12 ENCOUNTER — OFFICE VISIT (OUTPATIENT)
Dept: CARDIOLOGY CLINIC | Age: 83
End: 2023-09-12
Payer: MEDICARE

## 2023-09-12 VITALS
BODY MASS INDEX: 38.8 KG/M2 | SYSTOLIC BLOOD PRESSURE: 118 MMHG | WEIGHT: 219 LBS | DIASTOLIC BLOOD PRESSURE: 62 MMHG | RESPIRATION RATE: 16 BRPM | HEART RATE: 59 BPM | HEIGHT: 63 IN

## 2023-09-12 DIAGNOSIS — I25.10 CORONARY ARTERY DISEASE INVOLVING NATIVE CORONARY ARTERY OF NATIVE HEART WITHOUT ANGINA PECTORIS: Primary | ICD-10-CM

## 2023-09-12 PROCEDURE — 99213 OFFICE O/P EST LOW 20 MIN: CPT | Performed by: INTERNAL MEDICINE

## 2023-09-12 PROCEDURE — 3074F SYST BP LT 130 MM HG: CPT | Performed by: INTERNAL MEDICINE

## 2023-09-12 PROCEDURE — 3078F DIAST BP <80 MM HG: CPT | Performed by: INTERNAL MEDICINE

## 2023-09-12 PROCEDURE — 1123F ACP DISCUSS/DSCN MKR DOCD: CPT | Performed by: INTERNAL MEDICINE

## 2023-09-12 PROCEDURE — 93000 ELECTROCARDIOGRAM COMPLETE: CPT | Performed by: INTERNAL MEDICINE

## 2023-09-12 NOTE — PROGRESS NOTES
Totally occluded LAD with patent LIMA to LAD. *  Significant calcified lesion at the proximal first diagonal branch. *  Significant disease involving OM1, OM2 and borderline lesion involving the proximal circumflex artery and significant lesion involving the distal left circumflex artery. *  Totally occluded RCA. *  Totally occluded SVG graft to OM1, OM2.  *  Totally occluded SVG graft to RCA. *  Patent LIMA to distal LAD with mildly to moderately diffusely diseased LAD distal to LIMA to LAD anastomosis. Was referred to [facility] for second opinion, continuing medical therapy was advised, will continue current treatment  2. Hypertension: Controlled, continue current treatment. 3.  Hyperlipidemi: On Repatha  4. Personal history of pulmonary embolism       RECOMMENDATIONS:   1. Continue current treatment  2. Preventive cardiology: Low-salt, low-cholesterol diet, daily exercise, total cholesterol of less than 200, LDL of less than 70, were all advised. 3.  Follow-up with Dr. Libertad Allen as scheduled  4. Follow-up with Dr. Eda Mercado in 6 months, sooner if symptomatic for any reason    I have reviewed my findings and recommendations with patient    Electronically signed by Grisel Quintero MD on 9/12/2023 at 11:26 AM  NOTE: This report was transcribed using voice recognition software.  Every effort was made to ensure accuracy; however, inadvertent computerized transcription errors may be present

## 2023-09-19 ENCOUNTER — OFFICE VISIT (OUTPATIENT)
Dept: PRIMARY CARE CLINIC | Age: 83
End: 2023-09-19
Payer: MEDICARE

## 2023-09-19 VITALS
TEMPERATURE: 97.9 F | WEIGHT: 216.7 LBS | HEART RATE: 84 BPM | SYSTOLIC BLOOD PRESSURE: 136 MMHG | HEIGHT: 63 IN | OXYGEN SATURATION: 97 % | DIASTOLIC BLOOD PRESSURE: 80 MMHG | BODY MASS INDEX: 38.39 KG/M2

## 2023-09-19 DIAGNOSIS — I10 ESSENTIAL HYPERTENSION: ICD-10-CM

## 2023-09-19 DIAGNOSIS — K58.1 IRRITABLE BOWEL SYNDROME WITH CONSTIPATION: ICD-10-CM

## 2023-09-19 DIAGNOSIS — J31.0 CHRONIC RHINITIS: ICD-10-CM

## 2023-09-19 DIAGNOSIS — I25.728 CORONARY ARTERY DISEASE OF AUTOLOGOUS BYPASS GRAFT WITH STABLE ANGINA PECTORIS (HCC): ICD-10-CM

## 2023-09-19 DIAGNOSIS — M17.0 PRIMARY OSTEOARTHRITIS OF BOTH KNEES: Primary | ICD-10-CM

## 2023-09-19 DIAGNOSIS — I50.31 DIASTOLIC CHF, ACUTE (HCC): ICD-10-CM

## 2023-09-19 PROCEDURE — 3075F SYST BP GE 130 - 139MM HG: CPT | Performed by: INTERNAL MEDICINE

## 2023-09-19 PROCEDURE — 99214 OFFICE O/P EST MOD 30 MIN: CPT | Performed by: INTERNAL MEDICINE

## 2023-09-19 PROCEDURE — 3079F DIAST BP 80-89 MM HG: CPT | Performed by: INTERNAL MEDICINE

## 2023-09-19 PROCEDURE — 1123F ACP DISCUSS/DSCN MKR DOCD: CPT | Performed by: INTERNAL MEDICINE

## 2023-09-19 RX ORDER — PANTOPRAZOLE SODIUM 40 MG/1
40 TABLET, DELAYED RELEASE ORAL DAILY
Qty: 90 TABLET | Refills: 0 | Status: SHIPPED | OUTPATIENT
Start: 2023-09-19

## 2023-09-19 RX ORDER — LACTULOSE 10 G/15ML
10 SOLUTION ORAL 2 TIMES DAILY
Qty: 900 ML | Refills: 2 | Status: SHIPPED | OUTPATIENT
Start: 2023-09-19

## 2023-09-19 RX ORDER — METOPROLOL SUCCINATE 50 MG/1
50 TABLET, EXTENDED RELEASE ORAL 2 TIMES DAILY
Qty: 180 TABLET | Refills: 0 | Status: SHIPPED | OUTPATIENT
Start: 2023-09-19

## 2023-09-19 RX ORDER — LORATADINE 10 MG/1
10 TABLET ORAL DAILY
Qty: 90 TABLET | Refills: 0 | Status: SHIPPED | OUTPATIENT
Start: 2023-09-19

## 2023-09-19 RX ORDER — FUROSEMIDE 20 MG/1
20 TABLET ORAL DAILY
Qty: 90 TABLET | Refills: 0 | Status: SHIPPED | OUTPATIENT
Start: 2023-09-19

## 2023-09-19 RX ORDER — MONTELUKAST SODIUM 10 MG/1
10 TABLET ORAL DAILY
Qty: 90 TABLET | Refills: 0 | Status: SHIPPED | OUTPATIENT
Start: 2023-09-19

## 2023-09-19 RX ORDER — IBUPROFEN 200 MG
TABLET ORAL
Qty: 180 TABLET | Refills: 0 | Status: SHIPPED | OUTPATIENT
Start: 2023-09-19

## 2023-09-19 RX ORDER — LUBIPROSTONE 24 UG/1
24 CAPSULE ORAL 2 TIMES DAILY WITH MEALS
Qty: 30 CAPSULE | Refills: 1 | Status: CANCELLED | OUTPATIENT
Start: 2023-09-19

## 2023-09-19 RX ORDER — ISOSORBIDE MONONITRATE 30 MG/1
30 TABLET, EXTENDED RELEASE ORAL DAILY
Qty: 90 TABLET | Refills: 0 | Status: SHIPPED | OUTPATIENT
Start: 2023-09-19

## 2023-09-19 NOTE — PROGRESS NOTES
diclofenac sodium (VOLTAREN) 1 % GEL Apply 4 g topically 2 times daily (Patient taking differently: Apply 4 g topically 2 times daily as needed) 100 g 1    acetaminophen (TYLENOL) 650 MG extended release tablet Take 1 tablet by mouth 2 times daily as needed      mometasone (NASONEX) 50 MCG/ACT nasal spray 1 spray by Each Nostril route as needed      Albuterol Sulfate (PROAIR HFA IN) Inhale 2 puffs into the lungs 4 times daily as needed      [DISCONTINUED] montelukast (SINGULAIR) 10 MG tablet Take 1 tablet by mouth daily 90 tablet 0    [DISCONTINUED] metoprolol succinate (TOPROL XL) 50 MG extended release tablet Take 1 tablet by mouth 2 times daily 180 tablet 0    [DISCONTINUED] pantoprazole (PROTONIX) 40 MG tablet Take 1 tablet by mouth daily 90 tablet 0    [DISCONTINUED] lubiprostone (AMITIZA) 24 MCG capsule Take 1 capsule by mouth 2 times daily (with meals) 30 capsule 1    [DISCONTINUED] isosorbide mononitrate (IMDUR) 30 MG extended release tablet Take 1 tablet by mouth daily am 90 tablet 0    [DISCONTINUED] loratadine (CLARITIN) 10 MG tablet Take 1 tablet by mouth daily 90 tablet 0    [DISCONTINUED] furosemide (LASIX) 20 MG tablet Take 1 tablet by mouth daily 90 tablet 0    [DISCONTINUED] ibuprofen (ADVIL;MOTRIN) 200 MG tablet Take 1 tablet by mouth every 6 hours as needed for Pain Dr. [facility]      nitroGLYCERIN (NITROSTAT) 0.4 MG SL tablet Place 1 tablet under the tongue every 5 minutes as needed for Chest pain up to max of 3 total doses. If no relief after 1 dose, call 911. No facility-administered encounter medications on file as of 9/19/2023. Jorge Maurice was seen today for arthritis. Diagnoses and all orders for this visit:    Primary osteoarthritis of both knees  -     ibuprofen (ADVIL;MOTRIN) 200 MG tablet; 1 tab bid    Diastolic CHF, acute (HCC)  -     furosemide (LASIX) 20 MG tablet;  Take 1 tablet by mouth daily  -     metoprolol succinate (TOPROL XL) 50 MG extended release

## 2023-10-16 ENCOUNTER — OFFICE VISIT (OUTPATIENT)
Dept: ORTHOPEDIC SURGERY | Age: 83
End: 2023-10-16
Payer: MEDICARE

## 2023-10-16 VITALS — HEIGHT: 63 IN | BODY MASS INDEX: 38.27 KG/M2 | WEIGHT: 216 LBS | TEMPERATURE: 98 F

## 2023-10-16 DIAGNOSIS — M17.11 PRIMARY OSTEOARTHRITIS OF RIGHT KNEE: Primary | ICD-10-CM

## 2023-10-16 PROCEDURE — 99213 OFFICE O/P EST LOW 20 MIN: CPT | Performed by: ORTHOPAEDIC SURGERY

## 2023-10-16 PROCEDURE — 1123F ACP DISCUSS/DSCN MKR DOCD: CPT | Performed by: ORTHOPAEDIC SURGERY

## 2023-10-16 NOTE — PROGRESS NOTES
Chief Complaint:   Chief Complaint   Patient presents with    Knee Pain     F/u right knee pain. Patient wanting Christie Ovalles is up regarding her right knee. Is been bothering her a lot more lately. She limps when she walks. She uses a cane in her right hand. She has had previous Visco supplement injections in the past which did help her for a time and she would like to do that once again. Does not have any local help she decide to have surgery on her knee. Allergies; medications; past medical, surgical, family, and social history; and problem list have been reviewed today and updated as indicated in this encounter seen below. Exam: There is varus malalignment of the right lower extremity. Hip range of motion is good with no instability or pain. Right knee range of motion lacks about 5 degrees of extension. There is varus malalignment. There is medial gapping with stress examination and is pain associated. She has grossly stable collateral cruciate ligaments. Patellofemoral alignment is good. Her calf is supple with no tenderness. There is minimal effusion and no hyperemia or erythema in the knee today. Radiographs: Imaging from 4/21/2022 right knee nonweightbearing shows medial bone-on-bone contact and varus malalignment with hypertrophic marginal changes medial and patellofemoral.    ASSESSMENT:    Emma Morales was seen today for knee pain. Diagnoses and all orders for this visit:    Primary osteoarthritis of right knee        PLAN: We discussed treatment options and he is aware that surgery is definitive but he is concerned about getting help after his surgery. She prefers to have the Visco supplement injection and we will seek approval for that and proceed accordingly. No follow-ups on file.        Current Outpatient Medications   Medication Sig Dispense Refill    furosemide (LASIX) 20 MG tablet Take 1 tablet by mouth daily 90 tablet 0    loratadine (CLARITIN) 10 MG tablet

## 2023-11-01 ENCOUNTER — OFFICE VISIT (OUTPATIENT)
Dept: ORTHOPEDIC SURGERY | Age: 83
End: 2023-11-01

## 2023-11-01 DIAGNOSIS — M17.11 PRIMARY OSTEOARTHRITIS OF RIGHT KNEE: Primary | ICD-10-CM

## 2023-11-01 RX ORDER — HYALURONATE SODIUM 10 MG/ML
20 SYRINGE (ML) INTRAARTICULAR ONCE
Status: COMPLETED | OUTPATIENT
Start: 2023-11-01 | End: 2023-11-01

## 2023-11-01 RX ADMIN — Medication 20 MG: at 11:05

## 2023-11-01 NOTE — PROGRESS NOTES
35.0-39. 9) with comorbidity ([facility])    Obesity, Class II, BMI 35-39.9    Dyspnea on exertion    Coronary artery disease involving native coronary artery of native heart with angina pectoris [facility])       Past Medical History:   Diagnosis Date    Arthritis     shoulders, hips and knees, fingers    Bronchitis     CAD (coronary artery disease)     some blockage, being treated with aspirin; Dr. Vasu Chase last seen 1/3/22    GERD (gastroesophageal reflux disease)     Hyperlipidemia     Hypertension     Sinus infection 06/22/2022    LD of doxycline- 7/2/22       Past Surgical History:   Procedure Laterality Date    APPENDECTOMY      APPENDECTOMY      BLADDER SURGERY      BLADDER SUSPENSION      CARDIAC SURGERY      CATARACT EXTRACTION Left 07/01/2022    CHOLECYSTECTOMY      CHOLECYSTECTOMY      COLONOSCOPY  11/29/2012    diverticulosis, hemorrhoid    COLONOSCOPY  09/2015    CORONARY ANGIOPLASTY  10/11/2019    Angioplasty LAD by Dr Nida Stanton  06/17/2019    Bypass x4. H.  LIMB-LAD, SVG-OM1 SVG-OM2 SVG-RCA; Dr. Vasu Chase- last seen 1/3/22    CYST REMOVAL      back    DILATION AND CURETTAGE OF UTERUS      HYSTERECTOMY (CERVIX STATUS UNKNOWN)      INTRACAPSULAR CATARACT EXTRACTION Left 07/13/2022    CATARACT EXTRACTION WITH INTRAOCULAR LENS IMPLANT LEFT EYE performed by Aliza Neville DO at [address]   Allergen Reactions    Statins [Statins] Swelling and Rash     Muscle aches tongue swelled    Adhesive Tape      Causes reddness    Duricef [Cefadroxil]      Doesn't remember reaction    Gabapentin      Doesn't remember reaction    Oxaprozin      Possible rash    Sulfa Antibiotics Rash       Social History     Socioeconomic History    Marital status:       Spouse name: None    Number of children: None    Years of education: None    Highest education level: None   Tobacco Use    Smoking status: Former     Packs/day: 1.00     Years: 37.00     Additional pack years: 0.00

## 2023-11-08 ENCOUNTER — OFFICE VISIT (OUTPATIENT)
Dept: ORTHOPEDIC SURGERY | Age: 83
End: 2023-11-08

## 2023-11-08 VITALS — HEIGHT: 63 IN | TEMPERATURE: 98 F | WEIGHT: 216 LBS | BODY MASS INDEX: 38.27 KG/M2

## 2023-11-08 DIAGNOSIS — M17.11 PRIMARY OSTEOARTHRITIS OF RIGHT KNEE: Primary | ICD-10-CM

## 2023-11-08 RX ORDER — HYALURONATE SODIUM 10 MG/ML
20 SYRINGE (ML) INTRAARTICULAR ONCE
Status: COMPLETED | OUTPATIENT
Start: 2023-11-08 | End: 2023-11-08

## 2023-11-08 RX ADMIN — Medication 20 MG: at 09:15

## 2023-11-08 NOTE — PROGRESS NOTES
Chief Complaint:   Chief Complaint   Patient presents with    Knee Pain     Right knee Euflexxa #2       Kathi Elizabeth follows up for Euflexxa injection #2 right knee she has had no problems with the previous injection. There is no reported change in symptoms yet. Allergies; medications; past medical, surgical, family, and social history; and problem list have been reviewed today and updated as indicated in this encounter seen below. Exam: Exam:  There is varus malalignment of the right lower extremity. Hip range of motion is good with no instability or pain. Right knee range of motion lacks about 5 degrees of extension. There is varus malalignment. There is medial gapping with stress examination and is pain associated. She has grossly stable collateral cruciate ligaments. Patellofemoral alignment is good. Her calf is supple with no tenderness. There is minimal effusion and no hyperemia or erythema in the knee today    Radiographs: None    ASSESSMENT:    Emma Morales was seen today for knee pain. Diagnoses and all orders for this visit:    Primary osteoarthritis of right knee  -     CA ARTHROCENTESIS ASPIR&/INJ MAJOR JT/BURSA W/O US  -     sodium hyaluronate (EUFLEXXA, HYALGAN) injection 20 mg        PLAN: Aspiration and Injectionof the right knee with Euflexxa                                   20mgwas discussed with the patient. Discussion included but was not limited to potential risks and benefits. No contraindications to the procedure were noted. Understanding and agreement was indicated. The procedure was accomplished without incident using appropriate aseptic technique. Lot number [id number]   follow up for 3rd injection    Return in about 1 week (around 11/15/2023) for Euflexxa No. 3 right knee.        Current Outpatient Medications   Medication Sig Dispense Refill    furosemide (LASIX) 20 MG tablet Take 1 tablet by mouth daily 90 tablet 0    loratadine (CLARITIN) 10 MG tablet Take 1 tablet

## 2023-11-10 DIAGNOSIS — I10 ESSENTIAL HYPERTENSION: ICD-10-CM

## 2023-11-10 LAB
ALBUMIN SERPL-MCNC: 4.5 G/DL (ref 3.5–5.2)
ALP BLD-CCNC: 65 U/L (ref 35–104)
ALT SERPL-CCNC: 16 U/L (ref 0–32)
ANION GAP SERPL CALCULATED.3IONS-SCNC: 11 MMOL/L (ref 7–16)
AST SERPL-CCNC: 21 U/L (ref 0–31)
BILIRUB SERPL-MCNC: 0.4 MG/DL (ref 0–1.2)
BUN BLDV-MCNC: 15 MG/DL (ref 6–23)
CALCIUM SERPL-MCNC: 10.2 MG/DL (ref 8.6–10.2)
CHLORIDE BLD-SCNC: 103 MMOL/L (ref 98–107)
CO2: 25 MMOL/L (ref 22–29)
CREAT SERPL-MCNC: 0.7 MG/DL (ref 0.5–1)
GFR SERPL CREATININE-BSD FRML MDRD: >60 ML/MIN/1.73M2
GLUCOSE BLD-MCNC: 86 MG/DL (ref 74–99)
POTASSIUM SERPL-SCNC: 4.4 MMOL/L (ref 3.5–5)
SODIUM BLD-SCNC: 139 MMOL/L (ref 132–146)
TOTAL PROTEIN: 7.4 G/DL (ref 6.4–8.3)

## 2023-11-15 ENCOUNTER — OFFICE VISIT (OUTPATIENT)
Dept: ORTHOPEDIC SURGERY | Age: 83
End: 2023-11-15

## 2023-11-15 DIAGNOSIS — M17.11 PRIMARY OSTEOARTHRITIS OF RIGHT KNEE: Primary | ICD-10-CM

## 2023-11-15 RX ORDER — HYALURONATE SODIUM 10 MG/ML
20 SYRINGE (ML) INTRAARTICULAR ONCE
Status: COMPLETED | OUTPATIENT
Start: 2023-11-15 | End: 2023-11-15

## 2023-11-15 RX ADMIN — Medication 20 MG: at 09:28

## 2023-11-20 ENCOUNTER — OFFICE VISIT (OUTPATIENT)
Dept: PRIMARY CARE CLINIC | Age: 83
End: 2023-11-20
Payer: MEDICARE

## 2023-11-20 VITALS
BODY MASS INDEX: 38.95 KG/M2 | WEIGHT: 219.8 LBS | HEIGHT: 63 IN | OXYGEN SATURATION: 96 % | TEMPERATURE: 98.1 F | DIASTOLIC BLOOD PRESSURE: 82 MMHG | HEART RATE: 68 BPM | SYSTOLIC BLOOD PRESSURE: 130 MMHG

## 2023-11-20 DIAGNOSIS — E78.5 HYPERLIPIDEMIA LDL GOAL <100: ICD-10-CM

## 2023-11-20 DIAGNOSIS — I10 ESSENTIAL HYPERTENSION: ICD-10-CM

## 2023-11-20 DIAGNOSIS — I50.32 CHRONIC DIASTOLIC (CONGESTIVE) HEART FAILURE (HCC): ICD-10-CM

## 2023-11-20 DIAGNOSIS — E83.52 HYPERCALCEMIA: ICD-10-CM

## 2023-11-20 DIAGNOSIS — J31.0 CHRONIC RHINITIS: ICD-10-CM

## 2023-11-20 DIAGNOSIS — E21.3 HYPERPARATHYROIDISM (HCC): ICD-10-CM

## 2023-11-20 DIAGNOSIS — K58.1 IRRITABLE BOWEL SYNDROME WITH CONSTIPATION: Primary | ICD-10-CM

## 2023-11-20 PROCEDURE — 3075F SYST BP GE 130 - 139MM HG: CPT | Performed by: INTERNAL MEDICINE

## 2023-11-20 PROCEDURE — 99214 OFFICE O/P EST MOD 30 MIN: CPT | Performed by: INTERNAL MEDICINE

## 2023-11-20 PROCEDURE — 1123F ACP DISCUSS/DSCN MKR DOCD: CPT | Performed by: INTERNAL MEDICINE

## 2023-11-20 PROCEDURE — 3079F DIAST BP 80-89 MM HG: CPT | Performed by: INTERNAL MEDICINE

## 2023-11-20 RX ORDER — MONTELUKAST SODIUM 10 MG/1
10 TABLET ORAL DAILY
Qty: 90 TABLET | Refills: 0 | Status: SHIPPED | OUTPATIENT
Start: 2023-11-20

## 2023-11-20 RX ORDER — METOPROLOL SUCCINATE 50 MG/1
50 TABLET, EXTENDED RELEASE ORAL 2 TIMES DAILY
Qty: 180 TABLET | Refills: 0 | Status: SHIPPED | OUTPATIENT
Start: 2023-11-20

## 2023-11-20 RX ORDER — PANTOPRAZOLE SODIUM 40 MG/1
40 TABLET, DELAYED RELEASE ORAL DAILY
Qty: 90 TABLET | Refills: 0 | Status: SHIPPED | OUTPATIENT
Start: 2023-11-20

## 2023-11-20 RX ORDER — LACTULOSE 10 G/15ML
10 SOLUTION ORAL 2 TIMES DAILY
Qty: 2700 ML | Refills: 0 | Status: SHIPPED | OUTPATIENT
Start: 2023-11-20

## 2023-11-20 NOTE — PROGRESS NOTES
Value Ref Range Status   11/10/2023 >60 >60 mL/min/1.73m2 Final     Comment:       These results are not intended for use in patients <25years of age. eGFR results are calculated without a race factor using the 2021 CKD-EPI equation. Careful clinical correlation is recommended, particularly when comparing to results   calculated using previous equations. The CKD-EPI equation is less accurate in patients with extremes of muscle mass, extra-renal   metabolism of creatine, excessive creatine ingestion, or following therapy that affects   renal tubular secretion. GFR    Date Value Ref Range Status   08/19/2022 >60  Final        No results found. Assessment/Plan:    IBS constipation controlled.   Patient may add 1/3 tablespoon of lactulose 10 g daily as needed  Right knee osteoarthritis, symptom controlled  Essential hypertension controlled  Hypercalcemia stable  Diastolic congestive heart failure controlled  Coronary artery disease stable      Outpatient Encounter Medications as of 11/20/2023   Medication Sig Dispense Refill    pantoprazole (PROTONIX) 40 MG tablet Take 1 tablet by mouth daily 90 tablet 0    metoprolol succinate (TOPROL XL) 50 MG extended release tablet Take 1 tablet by mouth 2 times daily 180 tablet 0    montelukast (SINGULAIR) 10 MG tablet Take 1 tablet by mouth daily 90 tablet 0    lactulose (CHRONULAC) 10 GM/15ML solution Take 15 mLs by mouth 2 times daily 2700 mL 0    furosemide (LASIX) 20 MG tablet Take 1 tablet by mouth daily 90 tablet 0    loratadine (CLARITIN) 10 MG tablet Take 1 tablet by mouth daily 90 tablet 0    isosorbide mononitrate (IMDUR) 30 MG extended release tablet Take 1 tablet by mouth daily am 90 tablet 0    ibuprofen (ADVIL;MOTRIN) 200 MG tablet 1 tab bid 180 tablet 0    Evolocumab (REPATHA SURECLICK) 075 MG/ML SOAJ inject 1 milliliter subcutaneously EVERY 2 WEEKS 2 mL 5    aspirin 325 MG tablet Take 1 tablet by mouth daily      diclofenac sodium

## 2024-01-22 RX ORDER — EVOLOCUMAB 140 MG/ML
INJECTION, SOLUTION SUBCUTANEOUS
Qty: 2 ML | Refills: 5 | Status: SHIPPED | OUTPATIENT
Start: 2024-01-22

## 2024-01-31 DIAGNOSIS — I25.728 CORONARY ARTERY DISEASE OF AUTOLOGOUS BYPASS GRAFT WITH STABLE ANGINA PECTORIS (HCC): ICD-10-CM

## 2024-01-31 RX ORDER — ISOSORBIDE MONONITRATE 30 MG/1
30 TABLET, EXTENDED RELEASE ORAL DAILY
Qty: 90 TABLET | Refills: 0 | Status: SHIPPED | OUTPATIENT
Start: 2024-01-31

## 2024-02-08 ENCOUNTER — OFFICE VISIT (OUTPATIENT)
Dept: CARDIOLOGY CLINIC | Age: 84
End: 2024-02-08
Payer: MEDICARE

## 2024-02-08 VITALS
BODY MASS INDEX: 39.16 KG/M2 | SYSTOLIC BLOOD PRESSURE: 122 MMHG | RESPIRATION RATE: 16 BRPM | HEIGHT: 63 IN | DIASTOLIC BLOOD PRESSURE: 76 MMHG | HEART RATE: 54 BPM | WEIGHT: 221 LBS

## 2024-02-08 DIAGNOSIS — I25.10 CORONARY ARTERY DISEASE INVOLVING NATIVE CORONARY ARTERY OF NATIVE HEART WITHOUT ANGINA PECTORIS: Primary | ICD-10-CM

## 2024-02-08 PROCEDURE — 93000 ELECTROCARDIOGRAM COMPLETE: CPT | Performed by: INTERNAL MEDICINE

## 2024-02-08 PROCEDURE — 3078F DIAST BP <80 MM HG: CPT | Performed by: INTERNAL MEDICINE

## 2024-02-08 PROCEDURE — 3074F SYST BP LT 130 MM HG: CPT | Performed by: INTERNAL MEDICINE

## 2024-02-08 PROCEDURE — 99213 OFFICE O/P EST LOW 20 MIN: CPT | Performed by: INTERNAL MEDICINE

## 2024-02-08 PROCEDURE — 1123F ACP DISCUSS/DSCN MKR DOCD: CPT | Performed by: INTERNAL MEDICINE

## 2024-02-08 NOTE — PROGRESS NOTES
[facility] Cardiology Progress Note  Dr. Jaimie Bruce      Referring Physician: Homer Padgett MD  CHIEF COMPLAINT: CAD    Chief Complaint   Patient presents with    Coronary Artery Disease     6 month        HISTORY OF PRESENT ILLNESS:    Patient is 83 year old female with history of coronary artery disease, hyperlipidemia, diabetes mellitus, GERD, diastolic CHF, pulmonary embolism, is here for follow-up appointment.    Shortness of breath is at baseline, patient denies any chest pain, no lightheadedness, no dizziness, no palpitations, no pedal edema, no PND, no orthopnea, no syncope, no presyncopal episodes.  Functional capacity is at baseline    Past Medical History:   Diagnosis Date    Arthritis     shoulders, hips and knees, fingers    Bronchitis     CAD (coronary artery disease)     some blockage, being treated with aspirin; Dr. Bruce last seen 1/3/22    GERD (gastroesophageal reflux disease)     Hyperlipidemia     Hypertension     Sinus infection 06/22/2022    LD of doxycline- 7/2/22         Past Surgical History:   Procedure Laterality Date    APPENDECTOMY      APPENDECTOMY      BLADDER SURGERY      BLADDER SUSPENSION      CARDIAC SURGERY      CATARACT EXTRACTION Left 07/01/2022    CHOLECYSTECTOMY      CHOLECYSTECTOMY      COLONOSCOPY  11/29/2012    diverticulosis, hemorrhoid    COLONOSCOPY  09/2015    CORONARY ANGIOPLASTY  10/11/2019    Angioplasty LAD by Dr Bruce     CORONARY ARTERY BYPASS GRAFT  06/17/2019    Bypass x4.  [facility].  LIMB-LAD, SVG-OM1 SVG-OM2 SVG-RCA; Dr. Bruce- last seen 1/3/22    CYST REMOVAL      back    DILATION AND CURETTAGE OF UTERUS      HYSTERECTOMY (CERVIX STATUS UNKNOWN)      INTRACAPSULAR CATARACT EXTRACTION Left 07/13/2022    CATARACT EXTRACTION WITH INTRAOCULAR LENS IMPLANT LEFT EYE performed by Vernon Ventura DO at [facility] OR         Current Outpatient Medications   Medication Sig Dispense Refill    isosorbide mononitrate (IMDUR) 30 MG extended release tablet

## 2024-02-12 DIAGNOSIS — E78.5 HYPERLIPIDEMIA LDL GOAL <100: ICD-10-CM

## 2024-02-12 DIAGNOSIS — E83.52 HYPERCALCEMIA: ICD-10-CM

## 2024-02-12 DIAGNOSIS — E21.3 HYPERPARATHYROIDISM (HCC): ICD-10-CM

## 2024-02-12 LAB
ALBUMIN SERPL-MCNC: 4.5 G/DL (ref 3.5–5.2)
ALP BLD-CCNC: 63 U/L (ref 35–104)
ALT SERPL-CCNC: 18 U/L (ref 0–32)
ANION GAP SERPL CALCULATED.3IONS-SCNC: 16 MMOL/L (ref 7–16)
AST SERPL-CCNC: 23 U/L (ref 0–31)
BILIRUB SERPL-MCNC: 0.4 MG/DL (ref 0–1.2)
BUN BLDV-MCNC: 18 MG/DL (ref 6–23)
CALCIUM IONIZED: 1.31 MMOL/L (ref 1.15–1.33)
CALCIUM SERPL-MCNC: 10.3 MG/DL (ref 8.6–10.2)
CHLORIDE BLD-SCNC: 103 MMOL/L (ref 98–107)
CHOLESTEROL: 149 MG/DL
CO2: 24 MMOL/L (ref 22–29)
CREAT SERPL-MCNC: 0.8 MG/DL (ref 0.5–1)
GFR SERPL CREATININE-BSD FRML MDRD: >60 ML/MIN/1.73M2
GLUCOSE BLD-MCNC: 111 MG/DL (ref 74–99)
HDLC SERPL-MCNC: 45 MG/DL
LDL CHOLESTEROL: 78 MG/DL
PHOSPHORUS: 2.8 MG/DL (ref 2.5–4.5)
POTASSIUM SERPL-SCNC: 4.7 MMOL/L (ref 3.5–5)
SODIUM BLD-SCNC: 143 MMOL/L (ref 132–146)
TOTAL PROTEIN: 7.5 G/DL (ref 6.4–8.3)
TRIGL SERPL-MCNC: 130 MG/DL
VLDLC SERPL CALC-MCNC: 26 MG/DL

## 2024-02-20 ENCOUNTER — OFFICE VISIT (OUTPATIENT)
Dept: PRIMARY CARE CLINIC | Age: 84
End: 2024-02-20
Payer: MEDICARE

## 2024-02-20 VITALS
HEART RATE: 77 BPM | OXYGEN SATURATION: 97 % | TEMPERATURE: 97.9 F | SYSTOLIC BLOOD PRESSURE: 124 MMHG | WEIGHT: 221.8 LBS | HEIGHT: 63 IN | BODY MASS INDEX: 39.3 KG/M2 | DIASTOLIC BLOOD PRESSURE: 80 MMHG

## 2024-02-20 DIAGNOSIS — I50.31 DIASTOLIC CHF, ACUTE (HCC): ICD-10-CM

## 2024-02-20 DIAGNOSIS — I50.32 CHRONIC DIASTOLIC (CONGESTIVE) HEART FAILURE (HCC): ICD-10-CM

## 2024-02-20 DIAGNOSIS — M48.062 SPINAL STENOSIS OF LUMBAR REGION WITH NEUROGENIC CLAUDICATION: Primary | ICD-10-CM

## 2024-02-20 DIAGNOSIS — M62.81 QUADRICEPS WEAKNESS: ICD-10-CM

## 2024-02-20 DIAGNOSIS — E21.3 HYPERPARATHYROIDISM (HCC): ICD-10-CM

## 2024-02-20 DIAGNOSIS — I10 ESSENTIAL HYPERTENSION: ICD-10-CM

## 2024-02-20 DIAGNOSIS — J31.0 CHRONIC RHINITIS: ICD-10-CM

## 2024-02-20 DIAGNOSIS — E66.01 SEVERE OBESITY (BMI 35.0-39.9) WITH COMORBIDITY (HCC): ICD-10-CM

## 2024-02-20 PROCEDURE — 99214 OFFICE O/P EST MOD 30 MIN: CPT | Performed by: INTERNAL MEDICINE

## 2024-02-20 PROCEDURE — 3074F SYST BP LT 130 MM HG: CPT | Performed by: INTERNAL MEDICINE

## 2024-02-20 PROCEDURE — 1123F ACP DISCUSS/DSCN MKR DOCD: CPT | Performed by: INTERNAL MEDICINE

## 2024-02-20 PROCEDURE — 3079F DIAST BP 80-89 MM HG: CPT | Performed by: INTERNAL MEDICINE

## 2024-02-20 RX ORDER — FUROSEMIDE 20 MG/1
20 TABLET ORAL DAILY
Qty: 90 TABLET | Refills: 0 | Status: SHIPPED | OUTPATIENT
Start: 2024-02-20

## 2024-02-20 RX ORDER — LORATADINE 10 MG/1
10 TABLET ORAL DAILY
Qty: 90 TABLET | Refills: 0 | Status: SHIPPED | OUTPATIENT
Start: 2024-02-20

## 2024-02-20 RX ORDER — METOPROLOL SUCCINATE 50 MG/1
50 TABLET, EXTENDED RELEASE ORAL 2 TIMES DAILY
Qty: 180 TABLET | Refills: 0 | Status: SHIPPED | OUTPATIENT
Start: 2024-02-20

## 2024-02-20 RX ORDER — MONTELUKAST SODIUM 10 MG/1
10 TABLET ORAL DAILY
Qty: 90 TABLET | Refills: 0 | Status: SHIPPED | OUTPATIENT
Start: 2024-02-20

## 2024-02-20 RX ORDER — PANTOPRAZOLE SODIUM 40 MG/1
40 TABLET, DELAYED RELEASE ORAL DAILY
Qty: 90 TABLET | Refills: 0 | Status: SHIPPED | OUTPATIENT
Start: 2024-02-20

## 2024-02-20 SDOH — ECONOMIC STABILITY: FOOD INSECURITY: WITHIN THE PAST 12 MONTHS, YOU WORRIED THAT YOUR FOOD WOULD RUN OUT BEFORE YOU GOT MONEY TO BUY MORE.: NEVER TRUE

## 2024-02-20 SDOH — ECONOMIC STABILITY: FOOD INSECURITY: WITHIN THE PAST 12 MONTHS, THE FOOD YOU BOUGHT JUST DIDN'T LAST AND YOU DIDN'T HAVE MONEY TO GET MORE.: NEVER TRUE

## 2024-02-20 SDOH — ECONOMIC STABILITY: INCOME INSECURITY: HOW HARD IS IT FOR YOU TO PAY FOR THE VERY BASICS LIKE FOOD, HOUSING, MEDICAL CARE, AND HEATING?: NOT VERY HARD

## 2024-02-20 ASSESSMENT — PATIENT HEALTH QUESTIONNAIRE - PHQ9
1. LITTLE INTEREST OR PLEASURE IN DOING THINGS: 0
SUM OF ALL RESPONSES TO PHQ QUESTIONS 1-9: 0
2. FEELING DOWN, DEPRESSED OR HOPELESS: 0
SUM OF ALL RESPONSES TO PHQ9 QUESTIONS 1 & 2: 0
SUM OF ALL RESPONSES TO PHQ QUESTIONS 1-9: 0

## 2024-02-20 NOTE — PROGRESS NOTES
Chief Complaint   Patient presents with    Hip Pain     Left hip pain is worse than right but states she is very sore, medication refills       HPI:  Patient is here for follow-up.  Patient is here for the following problems  #1 follow-up and refills on her hypertension, CHF  Lab work showed potassium 4.7 GFR>60  LDL 78 total cholesterol 149    #2 follow-up on hypercalcemia serum calcium 10.3.    #3 patient complaining of bilateral hip pain low back pain starts when she starts walking for short distance, has difficulty standing up from his chair has to assist with both arms.  She had an MRI of the lumbar spine in 2020 that showed L3-L4 lumbar spinal stenosis    Past Medical History, Surgical History, and Family History has been reviewed and updated.    Review of Systems:  Constitutional:  No fever, no fatigue, no chills, no headaches, no weight change  Dermatology:  No rash, no mole, no dry or sensitive skin  ENT:  No cough, no sore throat, no sinus pain, no runny nose, no ear pain  Cardiology:  No chest pain, no palpitations, no leg edema, no shortness of breath, no PND  Gastroenterology:  No dysphagia, no abdominal pain, no nausea, no vomiting, no constipation, no diarrhea, no heartburn  Musculoskeletal:  No joint pain, no leg cramps, no back pain, no muscle aches  Respiratory:  No shortness of breath, no orthopnea, no wheezing, no PINTO, no hemoptysis  Urology:  No blood in the urine, no urinary frequency, no urinary incontinence, no urinary urgency, no nocturia, no dysuria    Vitals:    02/20/24 1050   BP: 124/80   Site: Right Upper Arm   Position: Sitting   Cuff Size: Medium Adult   Pulse: 77   Temp: 97.9 °F (36.6 °C)   TempSrc: Temporal   SpO2: 97%   Weight: 100.6 kg (221 lb 12.8 oz)   Height: 1.6 m (5' 2.99\")       General:  Patient alert and oriented x 3, NAD, pleasant  HEENT:  Atraumatic, normocephalic, PERRLA, EOMI, clear conjunctiva, TMs clear, nose-clear, throat - no erythema  Neck:  Supple, no goiter, no

## 2024-02-27 ENCOUNTER — OFFICE VISIT (OUTPATIENT)
Dept: PAIN MANAGEMENT | Age: 84
End: 2024-02-27
Payer: MEDICARE

## 2024-02-27 VITALS
WEIGHT: 221 LBS | DIASTOLIC BLOOD PRESSURE: 70 MMHG | TEMPERATURE: 97 F | SYSTOLIC BLOOD PRESSURE: 138 MMHG | RESPIRATION RATE: 18 BRPM | HEIGHT: 63 IN | BODY MASS INDEX: 39.16 KG/M2 | OXYGEN SATURATION: 96 % | HEART RATE: 58 BPM

## 2024-02-27 DIAGNOSIS — M47.816 LUMBAR SPONDYLOSIS: ICD-10-CM

## 2024-02-27 DIAGNOSIS — M47.816 LUMBAR FACET ARTHROPATHY: ICD-10-CM

## 2024-02-27 DIAGNOSIS — M51.9 LUMBAR DISC DISORDER: ICD-10-CM

## 2024-02-27 DIAGNOSIS — G89.4 CHRONIC PAIN SYNDROME: Primary | ICD-10-CM

## 2024-02-27 DIAGNOSIS — M48.062 SPINAL STENOSIS OF LUMBAR REGION WITH NEUROGENIC CLAUDICATION: ICD-10-CM

## 2024-02-27 PROCEDURE — 99204 OFFICE O/P NEW MOD 45 MIN: CPT | Performed by: PAIN MEDICINE

## 2024-02-27 PROCEDURE — 3078F DIAST BP <80 MM HG: CPT | Performed by: PAIN MEDICINE

## 2024-02-27 PROCEDURE — 1123F ACP DISCUSS/DSCN MKR DOCD: CPT | Performed by: PAIN MEDICINE

## 2024-02-27 PROCEDURE — 3075F SYST BP GE 130 - 139MM HG: CPT | Performed by: PAIN MEDICINE

## 2024-02-27 RX ORDER — SODIUM CHLORIDE 0.9 % (FLUSH) 0.9 %
5-40 SYRINGE (ML) INJECTION PRN
OUTPATIENT
Start: 2024-02-27

## 2024-02-27 RX ORDER — SODIUM CHLORIDE 9 MG/ML
INJECTION, SOLUTION INTRAVENOUS PRN
OUTPATIENT
Start: 2024-02-27

## 2024-02-27 RX ORDER — SODIUM CHLORIDE 0.9 % (FLUSH) 0.9 %
5-40 SYRINGE (ML) INJECTION EVERY 12 HOURS SCHEDULED
OUTPATIENT
Start: 2024-02-27

## 2024-02-27 NOTE — PROGRESS NOTES
[facility]         [address], [address] [place] [address]      [phone]          Consult Note      Patient:  DEMETRI Bass 1940    Date of Service:  24    Requesting Physician:  Homer Padgett MD    Reason for Consult:      Patient presents with complaints of low back pain that started a long time ago and has been progressively getting worse.  Pain is described as aching/constant.  Pain is aggravated by nothing. Pain is relieved by OTC pain medications.    HISTORY OF PRESENT ILLNESS:      Pain does radiate to both lower extremities (posterior aspect) down to the knee. She  does not have numbness, tingling and does not have bladder or bowel dysfunction.    She has been on anticoagulation medications to include ASA. The patient  has not been on herbal supplements.  The patient is not diabetic.    Imaging:   Lumbar spine MRI 2020:  Multilevel degenerative disc disease and multilevel degenerative facet  hypertrophy with canal stenosis at L3-4.    Previous treatments: Physical Therapy, Epidural Steroid Injection, and medications..      Opioid Agreement:  Renewal date:N/A    Past Medical History:   Diagnosis Date    Arthritis     shoulders, hips and knees, fingers    Bronchitis     CAD (coronary artery disease)     some blockage, being treated with aspirin; Dr. Bruce last seen 1/3/22    GERD (gastroesophageal reflux disease)     Hyperlipidemia     Hypertension     Sinus infection 2022    LD of doxycline- 22     Past Surgical History:   Procedure Laterality Date    APPENDECTOMY      APPENDECTOMY      BLADDER SURGERY      BLADDER SUSPENSION      CARDIAC SURGERY      CATARACT EXTRACTION Left 2022    CHOLECYSTECTOMY      CHOLECYSTECTOMY      COLONOSCOPY  2012    diverticulosis, hemorrhoid    COLONOSCOPY  2015    CORONARY ANGIOPLASTY  10/11/2019    Angioplasty LAD by Dr Bruce     CORONARY ARTERY BYPASS GRAFT  2019

## 2024-02-27 NOTE — PROGRESS NOTES
Kathi Elizabeth presents to the [facility] on 2/27/2024. Kathi is complaining of pain in her lower back and in b/l hips. The pain is constant. The pain is described as aching, throbbing, and tender. Pain is rated on her best day at a 3, on her worst day at a 8, and on average at a 5 on the VAS scale. She took her last dose of Motrin and Tylenol today.      Any procedures since your last visit: No    She is  on NSAIDS and  is  on anticoagulation medications to include ASA and is managed by Homer Padgett MD.    Pacemaker or defibrillator: No     Medication Contract and Consent for Opioid Use Documents Filed        No documents found                       Resp 18   Ht 1.6 m (5' 2.99\")   Wt 100.2 kg (221 lb)   BMI 39.16 kg/m²      No LMP recorded. Patient has had a hysterectomy.

## 2024-03-05 ENCOUNTER — PREP FOR PROCEDURE (OUTPATIENT)
Dept: PAIN MANAGEMENT | Age: 84
End: 2024-03-05

## 2024-03-05 DIAGNOSIS — M54.16 LUMBAR RADICULOPATHY: ICD-10-CM

## 2024-03-11 ENCOUNTER — TELEPHONE (OUTPATIENT)
Dept: PAIN MANAGEMENT | Age: 84
End: 2024-03-11

## 2024-03-11 PROBLEM — M54.16 LUMBAR RADICULOPATHY: Status: ACTIVE | Noted: 2024-03-05

## 2024-03-11 NOTE — TELEPHONE ENCOUNTER
Call to Kathi Elizabeth that procedure was approved for 3/18/2024 and that [facility] should call her a few days before for the pre op call and after 3:00 PM the business day before with the arrival time. Instructed Kathi to hold ibuprofen for 24 hours, naprosyn for 4 days and any aspirin containing products or fish oil for 5-7 days. Instructed to call office back if any questions. Kathi verbalized understanding.    Electronically signed by Jose L Pizarro RN on 3/11/2024 at 10:24 AM

## 2024-03-18 ENCOUNTER — HOSPITAL ENCOUNTER (OUTPATIENT)
Dept: OPERATING ROOM | Age: 84
Setting detail: OUTPATIENT SURGERY
Discharge: HOME | End: 2024-03-18
Attending: PAIN MEDICINE
Payer: MEDICARE

## 2024-03-18 ENCOUNTER — HOSPITAL ENCOUNTER (OUTPATIENT)
Age: 84
Setting detail: OUTPATIENT SURGERY
Discharge: HOME | End: 2024-03-18
Attending: PAIN MEDICINE | Admitting: PAIN MEDICINE
Payer: MEDICARE

## 2024-03-18 VITALS
HEIGHT: 62 IN | TEMPERATURE: 95.6 F | WEIGHT: 221 LBS | OXYGEN SATURATION: 97 % | BODY MASS INDEX: 40.67 KG/M2 | HEART RATE: 82 BPM | RESPIRATION RATE: 16 BRPM | SYSTOLIC BLOOD PRESSURE: 158 MMHG | DIASTOLIC BLOOD PRESSURE: 63 MMHG

## 2024-03-18 DIAGNOSIS — M54.16 LUMBAR RADICULOPATHY: ICD-10-CM

## 2024-03-18 PROCEDURE — 64493 INJ PARAVERT F JNT L/S 1 LEV: CPT | Performed by: PAIN MEDICINE

## 2024-03-18 PROCEDURE — 2709999900 HC NON-CHARGEABLE SUPPLY: Performed by: PAIN MEDICINE

## 2024-03-18 PROCEDURE — 7100000011 HC PHASE II RECOVERY - ADDTL 15 MIN: Performed by: PAIN MEDICINE

## 2024-03-18 PROCEDURE — 6360000002 HC RX W HCPCS: Performed by: PAIN MEDICINE

## 2024-03-18 PROCEDURE — 7100000010 HC PHASE II RECOVERY - FIRST 15 MIN: Performed by: PAIN MEDICINE

## 2024-03-18 PROCEDURE — 3600000005 HC SURGERY LEVEL 5 BASE: Performed by: PAIN MEDICINE

## 2024-03-18 PROCEDURE — 64494 INJ PARAVERT F JNT L/S 2 LEV: CPT | Performed by: PAIN MEDICINE

## 2024-03-18 PROCEDURE — 6360000004 HC RX CONTRAST MEDICATION: Performed by: PAIN MEDICINE

## 2024-03-18 PROCEDURE — 2500000003 HC RX 250 WO HCPCS: Performed by: PAIN MEDICINE

## 2024-03-18 RX ORDER — SODIUM CHLORIDE 0.9 % (FLUSH) 0.9 %
5-40 SYRINGE (ML) INJECTION PRN
Status: DISCONTINUED | OUTPATIENT
Start: 2024-03-18 | End: 2024-03-18 | Stop reason: HOSPADM

## 2024-03-18 RX ORDER — LIDOCAINE HYDROCHLORIDE 5 MG/ML
INJECTION, SOLUTION INFILTRATION; INTRAVENOUS PRN
Status: DISCONTINUED | OUTPATIENT
Start: 2024-03-18 | End: 2024-03-18 | Stop reason: ALTCHOICE

## 2024-03-18 RX ORDER — SODIUM CHLORIDE 9 MG/ML
INJECTION, SOLUTION INTRAVENOUS PRN
Status: DISCONTINUED | OUTPATIENT
Start: 2024-03-18 | End: 2024-03-18 | Stop reason: HOSPADM

## 2024-03-18 RX ORDER — SODIUM CHLORIDE 0.9 % (FLUSH) 0.9 %
5-40 SYRINGE (ML) INJECTION EVERY 12 HOURS SCHEDULED
Status: DISCONTINUED | OUTPATIENT
Start: 2024-03-18 | End: 2024-03-18 | Stop reason: HOSPADM

## 2024-03-18 ASSESSMENT — PAIN - FUNCTIONAL ASSESSMENT
PAIN_FUNCTIONAL_ASSESSMENT: NONE - DENIES PAIN
PAIN_FUNCTIONAL_ASSESSMENT: 0-10
PAIN_FUNCTIONAL_ASSESSMENT: NONE - DENIES PAIN

## 2024-03-18 ASSESSMENT — PAIN DESCRIPTION - DESCRIPTORS: DESCRIPTORS: DISCOMFORT

## 2024-03-18 NOTE — OP NOTE
3/18/2024    Patient: Kathi Elizabeth  :  1940  Age:  83 y.o.  Sex:  female     PRE-OPERATIVE DIAGNOSIS: Bilateral Lumbar spondylosis, lumbar facet syndrome.    POST-OPERATIVE DIAGNOSIS: Same.    PROCEDURE:  # 1 Fluoroscopic guided lumbar medial branch blocks Bilateral at Levels: L3, L4, L5 MB.    SURGEON: Joann BOWLES    ANESTHESIA: Local    ESTIMATED BLOOD LOSS: None.  ______________________________________________________________________  BRIEF HISTORY:  Kathi Elizabeth comes in today for 1 fluoroscopic guided lumbar medial branch blocks  Bilateral  at Levels: L3, L4, L5 MB.  The potential complications of this procedure were discussed with her again today. She has elected to undergo the aforementioned procedure.     Kathi’s complete History & Physical examination were reviewed in depth, a copy of which is in the chart.    DESCRIPTION OF PROCEDURE:   After confirming written and informed consent, a time-out was performed and Kathi’s name and date of birth, the procedure to be performed as well as the plan for the location of the needle insertion were confirmed.    The patient was brought into the procedure room and placed in the prone position on the fluoroscopy table. Standard monitors were placed and vital signs were observed throughout the procedure. The area of the lumbar spine was prepped with chloraprep and draped in a sterile manner. AP fluoroscopy was used to identify and jono bartons point at the targeted levels.The skin and subcutaneous tissues in these identified areas were anesthetized with 0.5%Lidocaine. A 22 # gauge 3 1/2 spinal needle was advanced toward the junction of the superior articular process and the transverse process, along the course of the medial branch. Satisfactory needle placement was confirmed by AP and oblique projections.  At the sacral alar level, the sacral alar region was visualized and the needle tip was positioned on the sacral alar at the base of the superior

## 2024-03-18 NOTE — H&P
negative      PHYSICAL EXAM:    VITALS:  BP (!) 155/78   Pulse 83   Temp (!) 95.6 °F (35.3 °C) (Temporal)   Resp 20   Ht 1.575 m (5' 2\")   Wt 100.2 kg (221 lb)   SpO2 95%   BMI 40.42 kg/m²     CONSTITUTIONAL:  awake, alert, cooperative, no apparent distress, and appears stated age    EYES: PERRLA, EOMI    LUNGS:  No increased work of breathing, no audible wheezing    CARDIOVASCULAR:  regular rate and rhythm    ABDOMEN:  Soft non tender non distended     EXTREMITIES: no signs of clubbing or cyanosis.    MUSCULOSKELETAL: negative for flaccid muscle tone or spastic movements.    SKIN: gross examination reveals no signs of rashes, or diaphoresis.    NEURO: Cranial nerves II-XII grossly intact. No signs of agitated mood.       Assessment/Plan:    Low back and leg pain for LESI L3-4.

## 2024-03-18 NOTE — DISCHARGE INSTRUCTIONS
pain, swelling, warmth, or redness in the area.  Red streaks leading from the area.  Pus draining from the wound.  A new or higher fever.   Watch closely for changes in your health, and be sure to contact your doctor if you have any problems.  Where can you learn more?  Go to https://www.[employer].net/patientEd and enter C340 to learn more about \"Infection After Surgery: Care Instructions.\"  Current as of: July 26, 2023               Content Version: 14.0  © 2006-2024 [employer].   Care instructions adapted under license by [employer]. If you have questions about a medical condition or this instruction, always ask your healthcare professional. [employer] disclaims any warranty or liability for your use of this information.

## 2024-04-02 ENCOUNTER — OFFICE VISIT (OUTPATIENT)
Dept: PRIMARY CARE CLINIC | Age: 84
End: 2024-04-02
Payer: MEDICARE

## 2024-04-02 VITALS
DIASTOLIC BLOOD PRESSURE: 80 MMHG | BODY MASS INDEX: 40.39 KG/M2 | OXYGEN SATURATION: 98 % | SYSTOLIC BLOOD PRESSURE: 124 MMHG | WEIGHT: 219.5 LBS | HEIGHT: 62 IN | TEMPERATURE: 98.1 F | HEART RATE: 67 BPM

## 2024-04-02 DIAGNOSIS — E83.52 HYPERCALCEMIA: ICD-10-CM

## 2024-04-02 DIAGNOSIS — I10 PRIMARY HYPERTENSION: ICD-10-CM

## 2024-04-02 DIAGNOSIS — M48.062 SPINAL STENOSIS OF LUMBAR REGION WITH NEUROGENIC CLAUDICATION: ICD-10-CM

## 2024-04-02 DIAGNOSIS — I25.728 CORONARY ARTERY DISEASE OF AUTOLOGOUS BYPASS GRAFT WITH STABLE ANGINA PECTORIS (HCC): ICD-10-CM

## 2024-04-02 DIAGNOSIS — E78.5 HYPERLIPIDEMIA LDL GOAL <100: ICD-10-CM

## 2024-04-02 DIAGNOSIS — E66.01 MORBID OBESITY (HCC): ICD-10-CM

## 2024-04-02 DIAGNOSIS — K58.1 IRRITABLE BOWEL SYNDROME WITH CONSTIPATION: ICD-10-CM

## 2024-04-02 DIAGNOSIS — M62.81 QUADRICEPS WEAKNESS: Primary | ICD-10-CM

## 2024-04-02 PROCEDURE — 1123F ACP DISCUSS/DSCN MKR DOCD: CPT | Performed by: INTERNAL MEDICINE

## 2024-04-02 PROCEDURE — 99214 OFFICE O/P EST MOD 30 MIN: CPT | Performed by: INTERNAL MEDICINE

## 2024-04-02 PROCEDURE — 3074F SYST BP LT 130 MM HG: CPT | Performed by: INTERNAL MEDICINE

## 2024-04-02 PROCEDURE — 3079F DIAST BP 80-89 MM HG: CPT | Performed by: INTERNAL MEDICINE

## 2024-04-02 NOTE — PROGRESS NOTES
U/L Final     ALT   Date Value Ref Range Status   02/12/2024 18 0 - 32 U/L Final     Est, Glom Filt Rate   Date Value Ref Range Status   02/12/2024 >60 >60 mL/min/1.73m2 Final     Comment:       These results are not intended for use in patients <18 years of age.    eGFR results are calculated without a race factor using the 2021 CKD-EPI equation.  Careful clinical correlation is recommended, particularly when comparing to results   calculated using previous equations.  The CKD-EPI equation is less accurate in patients with extremes of muscle mass, extra-renal   metabolism of creatine, excessive creatine ingestion, or following therapy that affects   renal tubular secretion.       GFR    Date Value Ref Range Status   08/19/2022 >60  Final        FLUORO FOR SURGICAL PROCEDURES    Result Date: 3/18/2024  Radiology exam is complete. No Radiologist dictation. Please follow up with ordering provider.        Assessment/Plan:    Lumbar spinal stenosis pain improved on epidural injection  Quadriceps muscles, weakness improved   Essential hypertension controlled  Hyperlipidemia controlled on Repatha  Chronic constipation controlled  Coronary artery disease stable  Morbid obesity BMI 40.14    Outpatient Encounter Medications as of 4/2/2024   Medication Sig Dispense Refill    furosemide (LASIX) 20 MG tablet Take 1 tablet by mouth daily 90 tablet 0    loratadine (CLARITIN) 10 MG tablet Take 1 tablet by mouth daily 90 tablet 0    metoprolol succinate (TOPROL XL) 50 MG extended release tablet Take 1 tablet by mouth 2 times daily 180 tablet 0    montelukast (SINGULAIR) 10 MG tablet Take 1 tablet by mouth daily 90 tablet 0    pantoprazole (PROTONIX) 40 MG tablet Take 1 tablet by mouth daily 90 tablet 0    isosorbide mononitrate (IMDUR) 30 MG extended release tablet Take 1 tablet by mouth daily am 90 tablet 0    REPATHA SURECLICK 140 MG/ML SOAJ inject 1 milliliter subcutaneously every 2 weeks 2 mL 5    ibuprofen

## 2024-04-04 ENCOUNTER — OFFICE VISIT (OUTPATIENT)
Dept: PAIN MANAGEMENT | Age: 84
End: 2024-04-04
Payer: MEDICARE

## 2024-04-04 VITALS
OXYGEN SATURATION: 95 % | HEIGHT: 62 IN | RESPIRATION RATE: 18 BRPM | WEIGHT: 219 LBS | BODY MASS INDEX: 40.3 KG/M2 | SYSTOLIC BLOOD PRESSURE: 122 MMHG | HEART RATE: 64 BPM | TEMPERATURE: 96.9 F | DIASTOLIC BLOOD PRESSURE: 70 MMHG

## 2024-04-04 DIAGNOSIS — G89.4 CHRONIC PAIN SYNDROME: ICD-10-CM

## 2024-04-04 DIAGNOSIS — M47.816 LUMBAR SPONDYLOSIS: ICD-10-CM

## 2024-04-04 DIAGNOSIS — M47.816 LUMBAR FACET ARTHROPATHY: ICD-10-CM

## 2024-04-04 DIAGNOSIS — M48.062 SPINAL STENOSIS OF LUMBAR REGION WITH NEUROGENIC CLAUDICATION: ICD-10-CM

## 2024-04-04 DIAGNOSIS — M54.16 LUMBAR RADICULOPATHY: ICD-10-CM

## 2024-04-04 DIAGNOSIS — M51.9 LUMBAR DISC DISORDER: Primary | ICD-10-CM

## 2024-04-04 PROCEDURE — 99213 OFFICE O/P EST LOW 20 MIN: CPT | Performed by: PAIN MEDICINE

## 2024-04-04 PROCEDURE — 1123F ACP DISCUSS/DSCN MKR DOCD: CPT | Performed by: PAIN MEDICINE

## 2024-04-04 PROCEDURE — 3078F DIAST BP <80 MM HG: CPT | Performed by: PAIN MEDICINE

## 2024-04-04 PROCEDURE — 3074F SYST BP LT 130 MM HG: CPT | Performed by: PAIN MEDICINE

## 2024-04-04 NOTE — PROGRESS NOTES
Kathi Elizabeth presents to the [facility] on 4/4/2024. Kathi is complaining of pain in her lower back . The pain is intermittent. The pain is described as aching. Pain is rated on her best day at a 0, on her worst day at a 5, and on average at a 2 on the VAS scale. She took her last dose of Motrin and Tylenol TODAY.      Any procedures since your last visit: Yes, with 85 % relief.    She is  on NSAIDS and  is  on anticoagulation medications to include ASA and is managed by Homer Padgett MD  .     Pacemaker or defibrillator: No     Medication Contract and Consent for Opioid Use Documents Filed        No documents found                       Resp 18   Ht 1.575 m (5' 2.01\")   Wt 99.3 kg (219 lb)   BMI 40.05 kg/m²      No LMP recorded. Patient has had a hysterectomy.  
degenerative facet hypertrophy with canal stenosis at L3-4.  Plan:  Follow up on her low back pain with radiation to biateral thighs with no acute issues.  Patient is s/p LESI L3-4 with excellent relief, will repeat as needed.  OARRS report reviewed 04/2024 consistent.  Patient encouraged to stay active and to lose weight  Treatment plan discussed with the patient including medications and procedure side effects     We discussed with the patient that combining opioids, benzodiazepines, alcohol, illicit drugs or sleep aids increases the risk of respiratory depression including death. We discussed that these medications may cause drowsiness, sedation or dizziness and have counseled the patient not to drive or operate machinery. We have discussed that these medications will be prescribed only by one provider. We have discussed with the patient about age related risk factors and have thoroughly discussed the importance of taking these medications as prescribed. The patient verbalizes understanding.    sherry David M.D.

## 2024-04-25 DIAGNOSIS — I25.728 CORONARY ARTERY DISEASE OF AUTOLOGOUS BYPASS GRAFT WITH STABLE ANGINA PECTORIS (HCC): ICD-10-CM

## 2024-04-25 RX ORDER — ISOSORBIDE MONONITRATE 30 MG/1
30 TABLET, EXTENDED RELEASE ORAL DAILY
Qty: 90 TABLET | Refills: 0 | Status: SHIPPED | OUTPATIENT
Start: 2024-04-25

## 2024-05-16 DIAGNOSIS — J31.0 CHRONIC RHINITIS: ICD-10-CM

## 2024-05-16 DIAGNOSIS — I50.31 DIASTOLIC CHF, ACUTE (HCC): ICD-10-CM

## 2024-05-16 DIAGNOSIS — I10 ESSENTIAL HYPERTENSION: ICD-10-CM

## 2024-05-16 RX ORDER — MONTELUKAST SODIUM 10 MG/1
10 TABLET ORAL DAILY
Qty: 90 TABLET | Refills: 0 | Status: SHIPPED | OUTPATIENT
Start: 2024-05-16

## 2024-05-16 RX ORDER — FUROSEMIDE 20 MG/1
20 TABLET ORAL DAILY
Qty: 90 TABLET | Refills: 0 | Status: SHIPPED | OUTPATIENT
Start: 2024-05-16

## 2024-05-16 RX ORDER — LORATADINE 10 MG/1
10 TABLET ORAL DAILY
Qty: 90 TABLET | Refills: 0 | Status: SHIPPED | OUTPATIENT
Start: 2024-05-16

## 2024-05-16 RX ORDER — METOPROLOL SUCCINATE 50 MG/1
50 TABLET, EXTENDED RELEASE ORAL 2 TIMES DAILY
Qty: 180 TABLET | Refills: 0 | Status: SHIPPED | OUTPATIENT
Start: 2024-05-16

## 2024-05-16 RX ORDER — PANTOPRAZOLE SODIUM 40 MG/1
40 TABLET, DELAYED RELEASE ORAL DAILY
Qty: 90 TABLET | Refills: 0 | Status: SHIPPED | OUTPATIENT
Start: 2024-05-16

## 2024-05-16 NOTE — TELEPHONE ENCOUNTER
Refill pantoprazole, montelukast, loratadine, metoprolol, and furosemide    [facility] Iraheta

## 2024-06-27 RX ORDER — EVOLOCUMAB 140 MG/ML
INJECTION, SOLUTION SUBCUTANEOUS
Qty: 2 ML | Refills: 5 | Status: SHIPPED | OUTPATIENT
Start: 2024-06-27

## 2024-07-02 ENCOUNTER — OFFICE VISIT (OUTPATIENT)
Dept: PRIMARY CARE CLINIC | Age: 84
End: 2024-07-02
Payer: MEDICARE

## 2024-07-02 VITALS
TEMPERATURE: 97.3 F | WEIGHT: 223.6 LBS | DIASTOLIC BLOOD PRESSURE: 68 MMHG | SYSTOLIC BLOOD PRESSURE: 132 MMHG | BODY MASS INDEX: 41.15 KG/M2 | HEIGHT: 62 IN | HEART RATE: 64 BPM | OXYGEN SATURATION: 98 %

## 2024-07-02 DIAGNOSIS — I50.31 DIASTOLIC CHF, ACUTE (HCC): ICD-10-CM

## 2024-07-02 DIAGNOSIS — I10 ESSENTIAL HYPERTENSION: ICD-10-CM

## 2024-07-02 DIAGNOSIS — I25.728 CORONARY ARTERY DISEASE OF AUTOLOGOUS BYPASS GRAFT WITH STABLE ANGINA PECTORIS (HCC): ICD-10-CM

## 2024-07-02 DIAGNOSIS — I10 PRIMARY HYPERTENSION: Primary | ICD-10-CM

## 2024-07-02 DIAGNOSIS — E83.52 HYPERCALCEMIA: ICD-10-CM

## 2024-07-02 DIAGNOSIS — E78.5 HYPERLIPIDEMIA LDL GOAL <100: ICD-10-CM

## 2024-07-02 DIAGNOSIS — J31.0 CHRONIC RHINITIS: ICD-10-CM

## 2024-07-02 PROCEDURE — 1123F ACP DISCUSS/DSCN MKR DOCD: CPT | Performed by: INTERNAL MEDICINE

## 2024-07-02 PROCEDURE — 99214 OFFICE O/P EST MOD 30 MIN: CPT | Performed by: INTERNAL MEDICINE

## 2024-07-02 PROCEDURE — 3078F DIAST BP <80 MM HG: CPT | Performed by: INTERNAL MEDICINE

## 2024-07-02 PROCEDURE — 3075F SYST BP GE 130 - 139MM HG: CPT | Performed by: INTERNAL MEDICINE

## 2024-07-02 RX ORDER — FUROSEMIDE 20 MG/1
20 TABLET ORAL DAILY
Qty: 90 TABLET | Refills: 0 | Status: SHIPPED | OUTPATIENT
Start: 2024-07-02

## 2024-07-02 RX ORDER — MONTELUKAST SODIUM 10 MG/1
10 TABLET ORAL DAILY
Qty: 90 TABLET | Refills: 0 | Status: SHIPPED | OUTPATIENT
Start: 2024-07-02

## 2024-07-02 RX ORDER — ISOSORBIDE MONONITRATE 30 MG/1
30 TABLET, EXTENDED RELEASE ORAL DAILY
Qty: 90 TABLET | Refills: 0 | Status: SHIPPED | OUTPATIENT
Start: 2024-07-02

## 2024-07-02 RX ORDER — METOPROLOL SUCCINATE 50 MG/1
50 TABLET, EXTENDED RELEASE ORAL 2 TIMES DAILY
Qty: 180 TABLET | Refills: 0 | Status: SHIPPED | OUTPATIENT
Start: 2024-07-02

## 2024-07-02 RX ORDER — LORATADINE 10 MG/1
10 TABLET ORAL DAILY
Qty: 90 TABLET | Refills: 0 | Status: SHIPPED | OUTPATIENT
Start: 2024-07-02

## 2024-07-02 NOTE — PROGRESS NOTES
Chief Complaint   Patient presents with    Hypertension     3 mth check up and no complaints, refills requested       HPI:  Patient is here for follow-up.  Patient feels well except for low back pain she has received epidural injection in January of this year she is followed by pain management  Denied cardiopulmonary symptoms compliant on medications  Requested medications refill  Had no recent lab work    Past Medical History, Surgical History, and Family History has been reviewed and updated.    Review of Systems:  Constitutional:  No fever, no fatigue, no chills, no headaches, no weight change  Dermatology:  No rash, no mole, no dry or sensitive skin  ENT:  No cough, no sore throat, no sinus pain, no runny nose, no ear pain  Cardiology:  No chest pain, no palpitations, no leg edema, no shortness of breath, no PND  Gastroenterology:  No dysphagia, no abdominal pain, no nausea, no vomiting, no constipation, no diarrhea, no heartburn  Musculoskeletal:  No joint pain, no leg cramps, no back pain, no muscle aches  Respiratory:  No shortness of breath, no orthopnea, no wheezing, no PINTO, no hemoptysis  Urology:  No blood in the urine, no urinary frequency, no urinary incontinence, no urinary urgency, no nocturia, no dysuria    Vitals:    07/02/24 1006   BP: 132/68   Site: Right Upper Arm   Position: Sitting   Cuff Size: Medium Adult   Pulse: 64   Temp: 97.3 °F (36.3 °C)   TempSrc: Temporal   SpO2: 98%   Weight: 101.4 kg (223 lb 9.6 oz)   Height: 1.575 m (5' 2.01\")       General:  Patient alert and oriented x 3, NAD, pleasant  HEENT:  Atraumatic, normocephalic, PERRLA, EOMI, clear conjunctiva, TMs clear, nose-clear, throat - no erythema  Neck:  Supple, no goiter, no carotid bruits, no LAD  Lungs:  CTA   Heart:  RRR, +systolic murmur unchanged,no gallops or rubs  Abdomen:  Soft/nt/nd, + bowel sounds  Lymph node examination: unremarkable  Neurological exam : unremarkable  Extremities:  No clubbing, cyanosis or edema  Skin:

## 2024-07-17 ENCOUNTER — OFFICE VISIT (OUTPATIENT)
Dept: PAIN MANAGEMENT | Age: 84
End: 2024-07-17
Payer: MEDICARE

## 2024-07-17 VITALS
SYSTOLIC BLOOD PRESSURE: 110 MMHG | OXYGEN SATURATION: 97 % | RESPIRATION RATE: 18 BRPM | WEIGHT: 223 LBS | HEART RATE: 61 BPM | HEIGHT: 62 IN | BODY MASS INDEX: 41.04 KG/M2 | TEMPERATURE: 96.8 F | DIASTOLIC BLOOD PRESSURE: 64 MMHG

## 2024-07-17 DIAGNOSIS — M54.16 LUMBAR RADICULOPATHY: ICD-10-CM

## 2024-07-17 DIAGNOSIS — G89.4 CHRONIC PAIN SYNDROME: Primary | ICD-10-CM

## 2024-07-17 DIAGNOSIS — M48.062 SPINAL STENOSIS OF LUMBAR REGION WITH NEUROGENIC CLAUDICATION: ICD-10-CM

## 2024-07-17 DIAGNOSIS — M47.816 LUMBAR SPONDYLOSIS: ICD-10-CM

## 2024-07-17 DIAGNOSIS — M47.816 LUMBAR FACET ARTHROPATHY: ICD-10-CM

## 2024-07-17 DIAGNOSIS — M51.9 LUMBAR DISC DISORDER: ICD-10-CM

## 2024-07-17 PROCEDURE — 3078F DIAST BP <80 MM HG: CPT | Performed by: PAIN MEDICINE

## 2024-07-17 PROCEDURE — 1123F ACP DISCUSS/DSCN MKR DOCD: CPT | Performed by: PAIN MEDICINE

## 2024-07-17 PROCEDURE — 3074F SYST BP LT 130 MM HG: CPT | Performed by: PAIN MEDICINE

## 2024-07-17 PROCEDURE — 99213 OFFICE O/P EST LOW 20 MIN: CPT | Performed by: PAIN MEDICINE

## 2024-07-17 PROCEDURE — 99214 OFFICE O/P EST MOD 30 MIN: CPT | Performed by: PAIN MEDICINE

## 2024-07-17 RX ORDER — SODIUM CHLORIDE 0.9 % (FLUSH) 0.9 %
5-40 SYRINGE (ML) INJECTION PRN
OUTPATIENT
Start: 2024-07-17

## 2024-07-17 RX ORDER — SODIUM CHLORIDE 0.9 % (FLUSH) 0.9 %
5-40 SYRINGE (ML) INJECTION EVERY 12 HOURS SCHEDULED
OUTPATIENT
Start: 2024-07-17

## 2024-07-17 RX ORDER — SODIUM CHLORIDE 9 MG/ML
INJECTION, SOLUTION INTRAVENOUS PRN
OUTPATIENT
Start: 2024-07-17

## 2024-07-17 NOTE — PROGRESS NOTES
Kathi Elizabeth presents to the [facility] on 7/17/2024. Kathi is complaining of pain in her lower back that radiates to BLE. The pain is intermittent. The pain is described as aching. Pain is rated on her best day at a 0, on her worst day at a 7, and on average at a 5 on the VAS scale. She took her last dose of Motrin and Tylenol yesterday.      Any procedures since your last visit: No    She is  on NSAIDS and  is  on anticoagulation medications to include ASA and is managed by Homer Padgett MD       Pacemaker or defibrillator: No     Medication Contract and Consent for Opioid Use Documents Filed        No documents found                       Resp 18   Ht 1.575 m (5' 2.01\")   Wt 101.2 kg (223 lb)   BMI 40.78 kg/m²      No LMP recorded. Patient has had a hysterectomy.

## 2024-07-17 NOTE — PROGRESS NOTES
[facility]  [address], [place] [address]  [phone]    Follow up Note      Kathi Elizabeth     Date of Visit:  7/17/2024    CC:  Patient presents for follow up   Chief Complaint   Patient presents with    Lower Back Pain     HPI:  Worsening low back pain with radiation to bilateral lower extremities, last seen 04/2024.  Pain is described as aching/intermittent and is aggravated by walking.  Appropriate analgesia with current medications regimen: N/A.    Change in quality of symptoms:no.    Medication side effects:not applicable .   Recent diagnostic testing:none.   Recent interventional procedures:none    She has been on anticoagulation medications to include ASA. The patient  has not been on herbal supplements.  The patient is not diabetic.     Imaging:   Lumbar spine MRI 2020:  Multilevel degenerative disc disease and multilevel degenerative facet  hypertrophy with canal stenosis at L3-4.     Previous treatments: Physical Therapy, Epidural Steroid Injection, and medications..         Potential Aberrant Drug-Related Behavior:    No    Urine Drug Screening:  None    OARRS report:  07/2024 consistent     Opioid Agreement:  Renewal date:N/A    Past Medical History:   Diagnosis Date    Arthritis     shoulders, hips and knees, fingers    Bronchitis     CAD (coronary artery disease)     some blockage, being treated with aspirin; Dr. Bruce last seen 1/3/22    GERD (gastroesophageal reflux disease)     Hyperlipidemia     Hypertension     Sinus infection 06/22/2022    LD of doxycline- 7/2/22     Past Surgical History:   Procedure Laterality Date    APPENDECTOMY      APPENDECTOMY      BLADDER SURGERY      BLADDER SUSPENSION      CARDIAC SURGERY      CATARACT EXTRACTION Left 07/01/2022    CHOLECYSTECTOMY      CHOLECYSTECTOMY      COLONOSCOPY  11/29/2012    diverticulosis, hemorrhoid    COLONOSCOPY  09/2015    CORONARY ANGIOPLASTY  10/11/2019    Angioplasty LAD by

## 2024-08-12 ENCOUNTER — OFFICE VISIT (OUTPATIENT)
Dept: CARDIOLOGY CLINIC | Age: 84
End: 2024-08-12
Payer: MEDICARE

## 2024-08-12 VITALS
RESPIRATION RATE: 16 BRPM | WEIGHT: 224 LBS | BODY MASS INDEX: 41.22 KG/M2 | SYSTOLIC BLOOD PRESSURE: 124 MMHG | HEIGHT: 62 IN | HEART RATE: 72 BPM | DIASTOLIC BLOOD PRESSURE: 98 MMHG

## 2024-08-12 DIAGNOSIS — I25.10 CORONARY ARTERY DISEASE INVOLVING NATIVE CORONARY ARTERY OF NATIVE HEART WITHOUT ANGINA PECTORIS: Primary | ICD-10-CM

## 2024-08-12 PROCEDURE — 3074F SYST BP LT 130 MM HG: CPT | Performed by: INTERNAL MEDICINE

## 2024-08-12 PROCEDURE — 99213 OFFICE O/P EST LOW 20 MIN: CPT | Performed by: INTERNAL MEDICINE

## 2024-08-12 PROCEDURE — 1123F ACP DISCUSS/DSCN MKR DOCD: CPT | Performed by: INTERNAL MEDICINE

## 2024-08-12 PROCEDURE — 93000 ELECTROCARDIOGRAM COMPLETE: CPT | Performed by: INTERNAL MEDICINE

## 2024-08-12 PROCEDURE — 3080F DIAST BP >= 90 MM HG: CPT | Performed by: INTERNAL MEDICINE

## 2024-08-12 NOTE — PROGRESS NOTES
distal LAD with mildly to moderately diffusely  diseased LAD distal to LIMA to LAD anastomosis.    CABG - (6/17/2019) Coronary artery bypass grafting times four, LIMA to the LAD, sequential vein bypass graft to the first and second obtuse marginal artery.  Saphenous vein bypass graft to the posterior descending artery.    Cardiology Labs: BMP:    Lab Results   Component Value Date/Time     02/12/2024 08:08 AM    K 4.7 02/12/2024 08:08 AM     02/12/2024 08:08 AM    CO2 24 02/12/2024 08:08 AM    BUN 18 02/12/2024 08:08 AM    CREATININE 0.8 02/12/2024 08:08 AM     CMP:    Lab Results   Component Value Date/Time     02/12/2024 08:08 AM    K 4.7 02/12/2024 08:08 AM     02/12/2024 08:08 AM    CO2 24 02/12/2024 08:08 AM    BUN 18 02/12/2024 08:08 AM    CREATININE 0.8 02/12/2024 08:08 AM     CBC:    Lab Results   Component Value Date/Time    WBC 6.8 08/19/2022 08:02 AM    RBC 4.59 08/19/2022 08:02 AM    HGB 14.3 08/19/2022 08:02 AM    HCT 44.6 08/19/2022 08:02 AM    MCV 97.2 08/19/2022 08:02 AM    RDW 12.4 08/19/2022 08:02 AM     08/19/2022 08:02 AM     PT/INR:  No results found for: \"PTINR\"  PT/INR Warfarin:  No components found for: \"PTPATWAR\", \"PTINRWAR\"  PTT:    Lab Results   Component Value Date/Time    APTT 37.7 10/09/2019 10:09 AM     PTT Heparin:  No components found for: \"APTTHEP\"  Magnesium:  No results found for: \"MG\"  TSH:    Lab Results   Component Value Date/Time    TSH 1.300 08/29/2022 10:24 AM     TROPONIN:  No components found for: \"TROP\"  BNP:  No results found for: \"BNP\"  FASTING LIPID PANEL:    Lab Results   Component Value Date/Time    CHOL 149 02/12/2024 08:08 AM    CHOL 131 07/11/2023 08:09 AM    HDL 45 02/12/2024 08:08 AM    TRIG 130 02/12/2024 08:08 AM     No orders to display     I have personally reviewed the laboratory, cardiac diagnostic and radiographic testing as outlined above:      IMPRESSION:  1.  CAD: Status post CABG with LIMA to LAD sequential SVG to M1 and

## 2024-08-19 ENCOUNTER — TELEPHONE (OUTPATIENT)
Dept: PAIN MANAGEMENT | Age: 84
End: 2024-08-19

## 2024-08-19 NOTE — TELEPHONE ENCOUNTER
Call to Kathi Elizabeth that procedure was scheduled for 8/26/2024 and that [facility] should call her a few days before for the pre op call and after 3:00 PM the business day before with the arrival time. Instructed Kathi to hold ibuprofen for 24 hours, Celebrex, Mobic, and naprosyn for 4 days and any aspirin containing products, CoQ 10, or fish oil for 7 days. Instructed to call office back if any questions. Kathi verbalized understanding.    Electronically signed by Jose L Pizarro RN on 8/19/2024 at 3:03 PM

## 2024-08-20 ENCOUNTER — ANESTHESIA EVENT (OUTPATIENT)
Dept: OPERATING ROOM | Age: 84
End: 2024-08-20
Payer: MEDICARE

## 2024-08-23 NOTE — ANESTHESIA PRE PROCEDURE
Department of Anesthesiology  Preprocedure Note       Name:  Kathi Elizabeth   Age:  83 y.o.  :  1940                                          MRN:  [id number]         Date:  2024      Surgeon: Surgeon(s):  Julian Story MD    Procedure: Procedure(s):  Lumbar epidural steroid injection L3-4 with low volume.SEDATION    Medications prior to admission:   Prior to Admission medications    Medication Sig Start Date End Date Taking? Authorizing Provider   isosorbide mononitrate (IMDUR) 30 MG extended release tablet Take 1 tablet by mouth daily am 24  Yes Homer Padgett MD   metoprolol succinate (TOPROL XL) 50 MG extended release tablet Take 1 tablet by mouth 2 times daily 24  Yes Homer Padgett MD   pantoprazole (PROTONIX) 40 MG tablet Take 1 tablet by mouth daily 24  Yes Homer Padgett MD   furosemide (LASIX) 20 MG tablet Take 1 tablet by mouth daily 24   Homer Padgett MD   loratadine (CLARITIN) 10 MG tablet Take 1 tablet by mouth daily 24   Homer Padgett MD   montelukast (SINGULAIR) 10 MG tablet Take 1 tablet by mouth daily 24   Homer Padgett MD   Evolocumab (REPATHA SURECLICK) 140 MG/ML SOAJ inject 1 milliliter subcutaneously every 2 weeks 24   Sean Sylvester MD   ibuprofen (ADVIL;MOTRIN) 200 MG tablet 1 tab bid 23   Homer Padgett MD   aspirin 325 MG tablet Take 1 tablet by mouth daily    ProvidereKysha MD   acetaminophen (TYLENOL) 650 MG extended release tablet Take 1 tablet by mouth 2 times daily as needed    Keysha Gilbert MD   mometasone (NASONEX) 50 MCG/ACT nasal spray 1 spray by Each Nostril route as needed    Keysha Gilbert MD   Albuterol Sulfate (PROAIR HFA IN) Inhale 2 puffs into the lungs 4 times daily as needed    Keysha Gilbert MD   nitroGLYCERIN (NITROSTAT) 0.4 MG SL tablet Place 1 tablet under the tongue every 5 minutes as needed for Chest pain up to max of 3 total doses. If no relief after 1 dose, call 911.  History:        Diagnosis Date    Arthritis     shoulders, hips and knees, fingers    Bronchitis     CAD (coronary artery disease)     some blockage, being treated with aspirin; Dr. Bruce last seen 1/3/22    GERD (gastroesophageal reflux disease)     Hyperlipidemia     Hypertension     Sinus infection 2022    LD of doxycline- 22       Past Surgical History:        Procedure Laterality Date    APPENDECTOMY      BLADDER SURGERY      BLADDER SUSPENSION      CARDIAC SURGERY      CHOLECYSTECTOMY      COLONOSCOPY  2012    diverticulosis, hemorrhoid    COLONOSCOPY  2015    CORONARY ANGIOPLASTY  10/11/2019    Angioplasty LAD by Dr Bruce     CORONARY ARTERY BYPASS GRAFT  2019    Bypass x4.  H.  LIMB-LAD, SVG-OM1 SVG-OM2 SVG-RCA; Dr. Bruce- last seen 1/3/22    CYST REMOVAL      back    DILATION AND CURETTAGE OF UTERUS      HYSTERECTOMY (CERVIX STATUS UNKNOWN)      INTRACAPSULAR CATARACT EXTRACTION Left 2022    CATARACT EXTRACTION WITH INTRAOCULAR LENS IMPLANT LEFT EYE performed by Vernon Ventura DO at [facility] OR    PAIN MANAGEMENT PROCEDURE N/A 2024    Lumbar epidural steroid injection L3-4 with low volume. performed by Julian Story MD at [facility] OR       Social History:    Social History     Tobacco Use    Smoking status: Former     Current packs/day: 0.00     Average packs/day: 1 pack/day for 37.0 years (37.0 ttl pk-yrs)     Types: Cigarettes     Start date:      Quit date:      Years since quittin.6    Smokeless tobacco: Never   Substance Use Topics    Alcohol use: No                                Counseling given: Not Answered      Vital Signs (Current):   Vitals:    24 0837 24 0808   BP:  (!) 151/85   Pulse:  61   Resp:  16   Temp:  98.2 °F (36.8 °C)   TempSrc:  Skin   SpO2:  95%   Weight: 101.2 kg (223 lb) 102.2 kg (225 lb 6.4 oz)   Height: 1.575 m (5' 2\") 1.626 m (5' 4\")                                              BP Readings

## 2024-08-26 ENCOUNTER — HOSPITAL ENCOUNTER (OUTPATIENT)
Age: 84
Setting detail: OUTPATIENT SURGERY
Discharge: HOME | End: 2024-08-26
Attending: PAIN MEDICINE | Admitting: PAIN MEDICINE
Payer: MEDICARE

## 2024-08-26 ENCOUNTER — HOSPITAL ENCOUNTER (OUTPATIENT)
Dept: OPERATING ROOM | Age: 84
Setting detail: OUTPATIENT SURGERY
Discharge: HOME | End: 2024-08-26
Attending: PAIN MEDICINE
Payer: MEDICARE

## 2024-08-26 ENCOUNTER — ANESTHESIA (OUTPATIENT)
Dept: OPERATING ROOM | Age: 84
End: 2024-08-26
Payer: MEDICARE

## 2024-08-26 VITALS
TEMPERATURE: 98.2 F | SYSTOLIC BLOOD PRESSURE: 120 MMHG | DIASTOLIC BLOOD PRESSURE: 53 MMHG | BODY MASS INDEX: 38.48 KG/M2 | HEART RATE: 53 BPM | WEIGHT: 225.4 LBS | HEIGHT: 64 IN | RESPIRATION RATE: 16 BRPM | OXYGEN SATURATION: 93 %

## 2024-08-26 DIAGNOSIS — M54.16 LUMBAR RADICULOPATHY: ICD-10-CM

## 2024-08-26 PROCEDURE — 2500000003 HC RX 250 WO HCPCS: Performed by: PAIN MEDICINE

## 2024-08-26 PROCEDURE — 2709999900 HC NON-CHARGEABLE SUPPLY: Performed by: PAIN MEDICINE

## 2024-08-26 PROCEDURE — 2580000003 HC RX 258: Performed by: ANESTHESIOLOGY

## 2024-08-26 PROCEDURE — 7100000011 HC PHASE II RECOVERY - ADDTL 15 MIN: Performed by: PAIN MEDICINE

## 2024-08-26 PROCEDURE — 6360000004 HC RX CONTRAST MEDICATION: Performed by: PAIN MEDICINE

## 2024-08-26 PROCEDURE — 6360000002 HC RX W HCPCS: Performed by: PAIN MEDICINE

## 2024-08-26 PROCEDURE — 62323 NJX INTERLAMINAR LMBR/SAC: CPT | Performed by: PAIN MEDICINE

## 2024-08-26 PROCEDURE — 6360000002 HC RX W HCPCS: Performed by: NURSE ANESTHETIST, CERTIFIED REGISTERED

## 2024-08-26 PROCEDURE — 3600000005 HC SURGERY LEVEL 5 BASE: Performed by: PAIN MEDICINE

## 2024-08-26 PROCEDURE — 7100000010 HC PHASE II RECOVERY - FIRST 15 MIN: Performed by: PAIN MEDICINE

## 2024-08-26 PROCEDURE — 3700000000 HC ANESTHESIA ATTENDED CARE: Performed by: PAIN MEDICINE

## 2024-08-26 RX ORDER — SODIUM CHLORIDE 0.9 % (FLUSH) 0.9 %
5-40 SYRINGE (ML) INJECTION EVERY 12 HOURS SCHEDULED
Status: DISCONTINUED | OUTPATIENT
Start: 2024-08-26 | End: 2024-08-26 | Stop reason: HOSPADM

## 2024-08-26 RX ORDER — LIDOCAINE HYDROCHLORIDE 5 MG/ML
INJECTION, SOLUTION INFILTRATION; INTRAVENOUS PRN
Status: DISCONTINUED | OUTPATIENT
Start: 2024-08-26 | End: 2024-08-26 | Stop reason: ALTCHOICE

## 2024-08-26 RX ORDER — SODIUM CHLORIDE 0.9 % (FLUSH) 0.9 %
5-40 SYRINGE (ML) INJECTION PRN
Status: DISCONTINUED | OUTPATIENT
Start: 2024-08-26 | End: 2024-08-26 | Stop reason: HOSPADM

## 2024-08-26 RX ORDER — SODIUM CHLORIDE, SODIUM LACTATE, POTASSIUM CHLORIDE, CALCIUM CHLORIDE 600; 310; 30; 20 MG/100ML; MG/100ML; MG/100ML; MG/100ML
INJECTION, SOLUTION INTRAVENOUS CONTINUOUS
Status: DISCONTINUED | OUTPATIENT
Start: 2024-08-26 | End: 2024-08-26 | Stop reason: HOSPADM

## 2024-08-26 RX ORDER — SODIUM CHLORIDE 9 MG/ML
INJECTION, SOLUTION INTRAVENOUS PRN
Status: DISCONTINUED | OUTPATIENT
Start: 2024-08-26 | End: 2024-08-26 | Stop reason: HOSPADM

## 2024-08-26 RX ORDER — FENTANYL CITRATE 50 UG/ML
INJECTION, SOLUTION INTRAMUSCULAR; INTRAVENOUS PRN
Status: DISCONTINUED | OUTPATIENT
Start: 2024-08-26 | End: 2024-08-26 | Stop reason: SDUPTHER

## 2024-08-26 RX ADMIN — SODIUM CHLORIDE, POTASSIUM CHLORIDE, SODIUM LACTATE AND CALCIUM CHLORIDE: 600; 310; 30; 20 INJECTION, SOLUTION INTRAVENOUS at 08:24

## 2024-08-26 RX ADMIN — FENTANYL CITRATE 50 MCG: 50 INJECTION, SOLUTION INTRAMUSCULAR; INTRAVENOUS at 09:20

## 2024-08-26 RX ADMIN — FENTANYL CITRATE 50 MCG: 50 INJECTION, SOLUTION INTRAMUSCULAR; INTRAVENOUS at 09:21

## 2024-08-26 ASSESSMENT — ENCOUNTER SYMPTOMS
DYSPNEA ACTIVITY LEVEL: NO INTERVAL CHANGE
SHORTNESS OF BREATH: 1

## 2024-08-26 ASSESSMENT — LIFESTYLE VARIABLES: SMOKING_STATUS: 0

## 2024-08-26 ASSESSMENT — PAIN - FUNCTIONAL ASSESSMENT
PAIN_FUNCTIONAL_ASSESSMENT: 0-10
PAIN_FUNCTIONAL_ASSESSMENT: NONE - DENIES PAIN

## 2024-08-26 NOTE — ANESTHESIA POSTPROCEDURE EVALUATION
Department of Anesthesiology  Postprocedure Note    Patient: Kathi Elizabeth  MRN: [id number]  YOB: 1940  Date of evaluation: 8/26/2024    Procedure Summary       Date: 08/26/24 Room / Location: [address]    Anesthesia Start: 0911 Anesthesia Stop: 0927    Procedure: Lumbar epidural steroid injection L3-4 with low volume.SEDATION (Back) Diagnosis:       Lumbar radiculopathy      (Lumbar radiculopathy [M54.16])    Surgeons: Julian Story MD Responsible Provider: Padmini Salguero DO    Anesthesia Type: MAC ASA Status: 3            Anesthesia Type: MAC    Theresa Phase I: Theresa Score: 10    Theresa Phase II: Theresa Score: 10    Anesthesia Post Evaluation    Patient location during evaluation: PACU  Patient participation: complete - patient participated  Level of consciousness: awake and alert  Airway patency: patent  Nausea & Vomiting: no nausea and no vomiting  Cardiovascular status: hemodynamically stable  Respiratory status: acceptable  Hydration status: euvolemic  Pain management: adequate    No notable events documented.

## 2024-08-26 NOTE — H&P
[facility]  [address], [place] [address]  [phone]    Procedure History & Physical      Kathi Elizabeth     HPI:    Patient  is here for low back and leg pain for LESI L3-4.  Labs/imaging studies reviewed   All question and concerns addressed including R/B/A associated with the procedure    Past Medical History:   Diagnosis Date    Arthritis     shoulders, hips and knees, fingers    Bronchitis     CAD (coronary artery disease)     some blockage, being treated with aspirin; Dr. Bruce last seen 1/3/22    GERD (gastroesophageal reflux disease)     Hyperlipidemia     Hypertension     Sinus infection 06/22/2022    LD of doxycline- 7/2/22       Past Surgical History:   Procedure Laterality Date    APPENDECTOMY      BLADDER SURGERY      BLADDER SUSPENSION      CARDIAC SURGERY      CHOLECYSTECTOMY      COLONOSCOPY  11/29/2012    diverticulosis, hemorrhoid    COLONOSCOPY  09/2015    CORONARY ANGIOPLASTY  10/11/2019    Angioplasty LAD by Dr Bruce     CORONARY ARTERY BYPASS GRAFT  06/17/2019    Bypass x4.  [facility].  LIMB-LAD, SVG-OM1 SVG-OM2 SVG-RCA; Dr. Bruce- last seen 1/3/22    CYST REMOVAL      back    DILATION AND CURETTAGE OF UTERUS      HYSTERECTOMY (CERVIX STATUS UNKNOWN)      INTRACAPSULAR CATARACT EXTRACTION Left 07/13/2022    CATARACT EXTRACTION WITH INTRAOCULAR LENS IMPLANT LEFT EYE performed by Vernon Ventura DO at [facility] OR    PAIN MANAGEMENT PROCEDURE N/A 03/18/2024    Lumbar epidural steroid injection L3-4 with low volume. performed by Julian Story MD at [facility] OR       Prior to Admission medications    Medication Sig Start Date End Date Taking? Authorizing Provider   isosorbide mononitrate (IMDUR) 30 MG extended release tablet Take 1 tablet by mouth daily am 7/2/24  Yes Homer Padgett MD   metoprolol succinate (TOPROL XL) 50 MG extended release tablet Take 1 tablet by mouth 2 times daily 7/2/24  Yes Homer Padgett MD

## 2024-08-26 NOTE — DISCHARGE INSTRUCTIONS
[facility] Pain Management Department  [phone]   Post-Pain Block/ Radiofrequency Home Going Instructions    1-Go home, rest for the remainder of the day    2-Please do not lift over 20 pounds the day of the injection    3-If you received sedation No: alcohol, driving, operating lawn mowers, plows, tractors or other dangerous equipment until next morning. Do not make important decisions or sign legal documents for 24 hours. You may experience light headedness, dizziness, nausea or sleepiness after sedation. Do not stay alone. A responsible adult must be with you for 24 hours. You could be nauseated from the medications you have received. Your IV site may be sore and bruised.    4-No dietary restrictions     5-Resume all medications the same day, blood thinners to be resumed 24 hours after injection    6-Keep the surgical site clean and dry, you may shower the next morning and remove the      dressing.     7- No sitz baths, tub baths or hot tubs/swimming for 24 hours.       8- If you have any pain at the injection site(s), application of an ice pack to the area should be       helpful, 20 minutes on/20 minutes off for next 48 hours.    9- Call [facility] pain management immediately at if you develop.  Fever greater than 100.4 F  Have bleeding or drainage from the puncture site  Have progressive Leg/arm numbness and or weakness  Loss of control of bowel and or bladder (wet/soil yourself)  Severe headache with inability to lift head    10-You may return to work the next day           Infection After Surgery: Care Instructions  Overview  After surgery, an infection is always possible. It doesn't mean that the surgery didn't go well.  Because an infection can be serious, your doctor has taken steps to manage it.  Your doctor checked the infection and cleaned it if necessary. Your doctor may have made an opening in the area so that the pus can drain out. You may have gauze in the cut so that the area will stay open and

## 2024-08-26 NOTE — OP NOTE
2024    Patient: Kathi Elizabeth  :  1940  Age:  83 y.o.  Sex:  female     PRE-OPERATIVE DIAGNOSIS: Lumbar disc displacement, lumbar radiculopathy.    POST-OPERATIVE DIAGNOSIS: Same.    PROCEDURE: Fluoroscopic guided therapeutic lumbar epidural steroid injection at the L3-4 level (# 1).    SURGEON: WILBUR David.    ANESTHESIA: MAC    ESTIMATED BLOOD LOSS: None.  ______________________________________________________________________    BRIEF HISTORY:  Kathi Elizabeth comes in today for first lumbar epidural injection at L3-4 level. The potential complications of this procedure were discussed with her again today.  She has elected to undergo the aforementioned procedure.    Kathi’s complete History & Physical examination were reviewed in depth, a copy of which is in the chart.      DESCRIPTION OF PROCEDURE:    After confirming written and informed consent, a time-out was performed and Kathi’s name and date of birth, the procedure to be performed as well as the plan for the location of the needle insertion were confirmed.    The patient was brought into the procedure room and placed in the prone position on the fluoroscopy table. A pillow was placed under the patient's lower abdomen/upper pelvis to increase lumbar interlaminar space. Standard monitors were placed, and vital signs were observed throughout the procedure. The area of the lumbar spine was prepped with chloraprep and draped in a sterile manner. The L3-4 interspace was identified and marked under AP fluoroscopy. The skin and subcutaneous tissues at the above level were anesthestized with 0.5% lidocaine. With intermittent fluoroscopy, an # 18 gauge 3 1/2 tuohy epidural needle was inserted and directed toward the interlaminar space. The needle was slowly advanced using loss of resistance technique and 5 cc glass syringe  until the tip of the epidural needle has passed through the ligamentum flavum and entered the epidural space. AP and lateral

## 2024-08-27 RX ORDER — PANTOPRAZOLE SODIUM 40 MG/1
40 TABLET, DELAYED RELEASE ORAL DAILY
Qty: 90 TABLET | Refills: 0 | Status: SHIPPED | OUTPATIENT
Start: 2024-08-27

## 2024-09-24 DIAGNOSIS — I25.728 CORONARY ARTERY DISEASE OF AUTOLOGOUS BYPASS GRAFT WITH STABLE ANGINA PECTORIS (HCC): ICD-10-CM

## 2024-09-24 DIAGNOSIS — I10 ESSENTIAL HYPERTENSION: ICD-10-CM

## 2024-09-24 DIAGNOSIS — E78.5 HYPERLIPIDEMIA LDL GOAL <100: ICD-10-CM

## 2024-09-24 DIAGNOSIS — I10 PRIMARY HYPERTENSION: ICD-10-CM

## 2024-09-24 DIAGNOSIS — E83.52 HYPERCALCEMIA: ICD-10-CM

## 2024-09-24 LAB
ALBUMIN: 4.4 G/DL (ref 3.5–5.2)
ALP BLD-CCNC: 68 U/L (ref 35–104)
ALT SERPL-CCNC: 25 U/L (ref 0–32)
ANION GAP SERPL CALCULATED.3IONS-SCNC: 16 MMOL/L (ref 7–16)
AST SERPL-CCNC: 21 U/L (ref 0–31)
BILIRUB SERPL-MCNC: 0.3 MG/DL (ref 0–1.2)
BUN BLDV-MCNC: 17 MG/DL (ref 6–23)
CALCIUM SERPL-MCNC: 10.6 MG/DL (ref 8.6–10.2)
CHLORIDE BLD-SCNC: 103 MMOL/L (ref 98–107)
CHOLESTEROL, TOTAL: 137 MG/DL
CO2: 23 MMOL/L (ref 22–29)
CREAT SERPL-MCNC: 0.8 MG/DL (ref 0.5–1)
GFR, ESTIMATED: 77 ML/MIN/1.73M2
GLUCOSE BLD-MCNC: 114 MG/DL (ref 74–99)
HDLC SERPL-MCNC: 45 MG/DL
LDL CHOLESTEROL: 71 MG/DL
POTASSIUM SERPL-SCNC: 4.6 MMOL/L (ref 3.5–5)
SODIUM BLD-SCNC: 142 MMOL/L (ref 132–146)
TOTAL PROTEIN: 7.6 G/DL (ref 6.4–8.3)
TRIGL SERPL-MCNC: 105 MG/DL
VLDLC SERPL CALC-MCNC: 21 MG/DL

## 2024-10-01 ENCOUNTER — OFFICE VISIT (OUTPATIENT)
Dept: PRIMARY CARE CLINIC | Age: 84
End: 2024-10-01

## 2024-10-01 VITALS
SYSTOLIC BLOOD PRESSURE: 120 MMHG | TEMPERATURE: 97.9 F | WEIGHT: 224.7 LBS | OXYGEN SATURATION: 97 % | HEART RATE: 73 BPM | DIASTOLIC BLOOD PRESSURE: 82 MMHG | BODY MASS INDEX: 38.36 KG/M2 | HEIGHT: 64 IN

## 2024-10-01 DIAGNOSIS — Z23 NEED FOR VACCINATION: ICD-10-CM

## 2024-10-01 DIAGNOSIS — I25.728 CORONARY ARTERY DISEASE OF AUTOLOGOUS BYPASS GRAFT WITH STABLE ANGINA PECTORIS (HCC): ICD-10-CM

## 2024-10-01 DIAGNOSIS — J31.0 CHRONIC RHINITIS: ICD-10-CM

## 2024-10-01 DIAGNOSIS — I50.31 DIASTOLIC CHF, ACUTE (HCC): ICD-10-CM

## 2024-10-01 DIAGNOSIS — I10 ESSENTIAL HYPERTENSION: ICD-10-CM

## 2024-10-01 DIAGNOSIS — M72.2 PLANTAR FASCIITIS: ICD-10-CM

## 2024-10-01 RX ORDER — MONTELUKAST SODIUM 10 MG/1
10 TABLET ORAL DAILY
Qty: 90 TABLET | Refills: 0 | Status: SHIPPED | OUTPATIENT
Start: 2024-10-01

## 2024-10-01 RX ORDER — FUROSEMIDE 20 MG
20 TABLET ORAL DAILY
Qty: 90 TABLET | Refills: 0 | Status: SHIPPED | OUTPATIENT
Start: 2024-10-01

## 2024-10-01 RX ORDER — LORATADINE 10 MG/1
10 TABLET ORAL DAILY
Qty: 90 TABLET | Refills: 0 | Status: SHIPPED | OUTPATIENT
Start: 2024-10-01

## 2024-10-01 RX ORDER — ISOSORBIDE MONONITRATE 30 MG/1
30 TABLET, EXTENDED RELEASE ORAL DAILY
Qty: 90 TABLET | Refills: 0 | Status: SHIPPED | OUTPATIENT
Start: 2024-10-01

## 2024-10-01 RX ORDER — METOPROLOL SUCCINATE 50 MG/1
50 TABLET, EXTENDED RELEASE ORAL 2 TIMES DAILY
Qty: 180 TABLET | Refills: 0 | Status: SHIPPED | OUTPATIENT
Start: 2024-10-01

## 2024-10-01 NOTE — PROGRESS NOTES
visit.     On this date 10/1/2024 I have spent 30 minutes reviewing previous notes, test results and face to face with the patient discussing the diagnosis and importance of compliance with the treatment plan as well as documenting on the day of the visit.      Homer Padgett MD   10/1/24

## 2024-10-15 ENCOUNTER — OFFICE VISIT (OUTPATIENT)
Dept: PODIATRY | Age: 84
End: 2024-10-15

## 2024-10-15 VITALS — WEIGHT: 224 LBS | BODY MASS INDEX: 38.24 KG/M2 | HEIGHT: 64 IN

## 2024-10-15 DIAGNOSIS — R60.0 LOCALIZED EDEMA: ICD-10-CM

## 2024-10-15 DIAGNOSIS — M79.671 RIGHT FOOT PAIN: ICD-10-CM

## 2024-10-15 DIAGNOSIS — M77.51 CAPSULITIS OF METATARSOPHALANGEAL (MTP) JOINT OF RIGHT FOOT: Primary | ICD-10-CM

## 2024-10-15 DIAGNOSIS — R26.2 DIFFICULTY WALKING: ICD-10-CM

## 2024-10-15 NOTE — PROGRESS NOTES
New patient here for right foot pain. Patient has had this pain for about 6 weeks. She has been rolling her foot on a can and it has helped. Homer Padgett MD last visit 10/1/2024   Electronically signed by Alejandra Lopez LPN on 10/15/2024 at 11:09 AM    
clean, dry and intact until the next follow up visit. The patient was instructed to look for signs of redness, irritation, blistering and pain.  If these or any other symptoms were to develop, they were advised to contact the office immediately for reevaluation.      Exam:  VASCULAR: Pedal pulses palpable right foot.  Capillary refill time less than 5 seconds digits 1 through 5 right foot  NEUROLOGICAL: Epicritic sensations intact right lower extremity  DERMATOLOGICAL: Edema noted without ecchymotic skin changes into the plantar right forefoot region.  No plantar callosities, skin abrasions, ecchymosis, or any signs of infection noted right foot.  MUSCULOSKELETAL: Tenderness noted with active range of motion second and third MTPJ regions right foot.  Mild tenderness into the plantar fascial region right foot.  Antalgic gait noted upon evaluation.    Plan Per Assessment  Kathi was seen today for foot pain.    Diagnoses and all orders for this visit:    Capsulitis of metatarsophalangeal (MTP) joint of right foot    Localized edema    Right foot pain  -     XR FOOT RIGHT (MIN 3 VIEWS); Future    Difficulty walking        New patient evaluation and management  Radiographs were ordered and reviewed with patient in detail today.  No acute osseous abnormalities noted.  Compression dressing applied symptomatic right lower extremity to reduce current symptoms.  Patient was also fitted and dispensed an offloading shoe to utilize in the interim.  Patient is to continue utilizing her assistive walking device as well to prevent any additional issues with gait.  Patient will be followed up at a later date for continued evaluation and management, until issues are improved.  She was advised to call the office with any questions or concerns in the interim.      Seen By:    Lorenzo Giraldo Jr, DPM    Electronically signed by Lorenzo Giraldo Jr, DPM on 10/15/2024 at 12:18 PM      This note was created using voice recognition

## 2024-10-22 ENCOUNTER — OFFICE VISIT (OUTPATIENT)
Dept: PODIATRY | Age: 84
End: 2024-10-22

## 2024-10-22 VITALS — BODY MASS INDEX: 38.24 KG/M2 | WEIGHT: 224 LBS | HEIGHT: 64 IN

## 2024-10-22 DIAGNOSIS — R26.2 DIFFICULTY WALKING: ICD-10-CM

## 2024-10-22 DIAGNOSIS — M77.51 CAPSULITIS OF METATARSOPHALANGEAL (MTP) JOINT OF RIGHT FOOT: Primary | ICD-10-CM

## 2024-10-22 NOTE — PROGRESS NOTES
Patient here for right foot pain, MTP joint. Patient states her foot is feeling better, the unna boot helped. Homer Padgett MD last visit 10/1/2024   Electronically signed by Alejandra Lopez LPN on 10/22/2024 at 11:07 AM

## 2024-10-23 NOTE — PROGRESS NOTES
10/22/24     Kathi Elizabeth    : 1940   Sex: female    Age: 83 y.o.    Patient's PCP/Provider is:  Homer Padgett MD    Subjective:  Patient is seen today for follow-up regarding continued care regarding capsulitis issues right lower extremity.  Overall patient is doing well at this time with minimal issues noted.  She denies any additional abnormalities.  Patient is very pleased with current care provided and outcome.    Chief Complaint   Patient presents with    Foot Pain     Right foot pain MTP joint       ROS:  Const: Positives and pertinent negatives as per HPI.    Musculo: Denies symptoms other than stated above.  Neuro: Denies symptoms other than stated above.  Skin: Denies symptoms other than stated above.    Current Medications:    Current Outpatient Medications:     amoxicillin-clavulanate (AUGMENTIN) 875-125 MG per tablet, Take 1 tablet by mouth 2 times daily, Disp: , Rfl:     diclofenac sodium (VOLTAREN) 1 % GEL, APPLY 4 GRAMS TO THE AFFECTED AREA(S) BY TOPICAL ROUTE 2 TIMES PER DAY, Disp: , Rfl:     isosorbide mononitrate (IMDUR) 30 MG extended release tablet, Take 1 tablet by mouth daily am, Disp: 90 tablet, Rfl: 0    furosemide (LASIX) 20 MG tablet, Take 1 tablet by mouth daily, Disp: 90 tablet, Rfl: 0    loratadine (CLARITIN) 10 MG tablet, Take 1 tablet by mouth daily, Disp: 90 tablet, Rfl: 0    metoprolol succinate (TOPROL XL) 50 MG extended release tablet, Take 1 tablet by mouth 2 times daily, Disp: 180 tablet, Rfl: 0    montelukast (SINGULAIR) 10 MG tablet, Take 1 tablet by mouth daily, Disp: 90 tablet, Rfl: 0    pantoprazole (PROTONIX) 40 MG tablet, Take 1 tablet by mouth daily, Disp: 90 tablet, Rfl: 0    Evolocumab (REPATHA SURECLICK) 140 MG/ML SOAJ, inject 1 milliliter subcutaneously every 2 weeks, Disp: 2 mL, Rfl: 5    ibuprofen (ADVIL;MOTRIN) 200 MG tablet, 1 tab bid, Disp: 180 tablet, Rfl: 0    aspirin 325 MG tablet, Take 1 tablet by mouth daily, Disp: , Rfl:     acetaminophen

## 2024-11-15 NOTE — TELEPHONE ENCOUNTER
Name of Medication(s) Requested:  Requested Prescriptions     Pending Prescriptions Disp Refills    pantoprazole (PROTONIX) 40 MG tablet [Pharmacy Med Name: Pantoprazole Sodium Oral Tablet Delayed Release 40 MG] 90 tablet 0     Sig: TAKE ONE TABLET BY MOUTH DAILY       Medication is on current medication list Yes    Dosage and directions were verified? Yes    Quantity verified: 90 day supply     Pharmacy Verified?  Yes    Last Appointment:  10/1/2024    Future appts:  Future Appointments   Date Time Provider Department Center   1/2/2025  9:20 AM Homer Padgett MD [facility]   1/7/2025 10:00 AM Julian Story MD [facility]        (If no appt send self scheduling link. .REFILLAPPT)  Scheduling request sent?     [] Yes  [] No    Does patient need updated?  [] Yes  [x] No

## 2024-11-19 RX ORDER — PANTOPRAZOLE SODIUM 40 MG/1
40 TABLET, DELAYED RELEASE ORAL DAILY
Qty: 90 TABLET | Refills: 0 | Status: SHIPPED | OUTPATIENT
Start: 2024-11-19

## 2024-11-21 RX ORDER — PANTOPRAZOLE SODIUM 40 MG/1
40 TABLET, DELAYED RELEASE ORAL DAILY
Qty: 90 TABLET | Refills: 0 | OUTPATIENT
Start: 2024-11-21

## 2024-11-25 ENCOUNTER — TELEPHONE (OUTPATIENT)
Dept: PRIMARY CARE CLINIC | Age: 84
End: 2024-11-25

## 2024-11-25 NOTE — TELEPHONE ENCOUNTER
Name of Medication(s) Requested:  Requested Prescriptions     Pending Prescriptions Disp Refills    pantoprazole (PROTONIX) 40 MG tablet 90 tablet 0     Sig: Take 1 tablet by mouth daily       Medication is on current medication list Yes    Dosage and directions were verified? Yes    Quantity verified: 90 day supply     Pharmacy Verified?  Yes    Last Appointment:  10/1/2024    Future appts:  Future Appointments   Date Time Provider Department Center   1/2/2025  9:20 AM [facility]   1/7/2025 10:00 AM Julian Story MD [facility]        (If no appt send self scheduling link. .REFILLAPPT)  Scheduling request sent?     [] Yes  [] No    Does patient need updated?  [] Yes  [] No

## 2024-11-26 RX ORDER — PANTOPRAZOLE SODIUM 40 MG/1
40 TABLET, DELAYED RELEASE ORAL DAILY
Qty: 90 TABLET | Refills: 0 | Status: SHIPPED | OUTPATIENT
Start: 2024-11-26

## 2025-01-10 DIAGNOSIS — I25.728 CORONARY ARTERY DISEASE OF AUTOLOGOUS BYPASS GRAFT WITH STABLE ANGINA PECTORIS (HCC): ICD-10-CM

## 2025-01-10 DIAGNOSIS — J31.0 CHRONIC RHINITIS: ICD-10-CM

## 2025-01-10 RX ORDER — ISOSORBIDE MONONITRATE 30 MG/1
30 TABLET, EXTENDED RELEASE ORAL DAILY
Qty: 90 TABLET | Refills: 0 | Status: SHIPPED | OUTPATIENT
Start: 2025-01-10

## 2025-01-10 RX ORDER — LORATADINE 10 MG/1
10 TABLET ORAL DAILY
Qty: 90 TABLET | Refills: 0 | Status: SHIPPED | OUTPATIENT
Start: 2025-01-10

## 2025-01-10 RX ORDER — MONTELUKAST SODIUM 10 MG/1
10 TABLET ORAL DAILY
Qty: 90 TABLET | Refills: 0 | Status: SHIPPED | OUTPATIENT
Start: 2025-01-10

## 2025-01-10 RX ORDER — ISOSORBIDE MONONITRATE 30 MG/1
TABLET, EXTENDED RELEASE ORAL
Qty: 90 TABLET | Refills: 0 | OUTPATIENT
Start: 2025-01-10

## 2025-01-10 NOTE — TELEPHONE ENCOUNTER
Name of Medication(s) Requested:  Requested Prescriptions     Pending Prescriptions Disp Refills    isosorbide mononitrate (IMDUR) 30 MG extended release tablet 90 tablet 0     Sig: Take 1 tablet by mouth daily am    loratadine (CLARITIN) 10 MG tablet 90 tablet 0     Sig: Take 1 tablet by mouth daily    montelukast (SINGULAIR) 10 MG tablet 90 tablet 0     Sig: Take 1 tablet by mouth daily       Medication is on current medication list Yes    Dosage and directions were verified? Yes    Quantity verified: 90 day supply     Pharmacy Verified?  Yes    Last Appointment:  10/1/2024    Future appts:  Future Appointments   Date Time Provider Department Center   1/28/2025 11:30 AM Julian Story MD [facility]   3/6/2025 10:00 AM Homer Padgett MD [facility]        (If no appt send self scheduling link. .REFILLAPPT)  Scheduling request sent?     [] Yes  [] No    Does patient need updated?  [] Yes  [x] No  
Home

## 2025-01-27 RX ORDER — EVOLOCUMAB 140 MG/ML
INJECTION, SOLUTION SUBCUTANEOUS
Qty: 2 ML | Refills: 5 | Status: CANCELLED | OUTPATIENT
Start: 2025-01-27

## 2025-01-28 ENCOUNTER — OFFICE VISIT (OUTPATIENT)
Dept: PAIN MANAGEMENT | Age: 85
End: 2025-01-28
Payer: MEDICARE

## 2025-01-28 ENCOUNTER — TELEPHONE (OUTPATIENT)
Dept: PAIN MANAGEMENT | Age: 85
End: 2025-01-28

## 2025-01-28 VITALS
RESPIRATION RATE: 18 BRPM | WEIGHT: 224 LBS | SYSTOLIC BLOOD PRESSURE: 110 MMHG | DIASTOLIC BLOOD PRESSURE: 60 MMHG | BODY MASS INDEX: 38.24 KG/M2 | HEART RATE: 58 BPM | TEMPERATURE: 96.4 F | HEIGHT: 64 IN | OXYGEN SATURATION: 97 %

## 2025-01-28 DIAGNOSIS — I50.31 DIASTOLIC CHF, ACUTE (HCC): ICD-10-CM

## 2025-01-28 DIAGNOSIS — M47.816 LUMBAR FACET ARTHROPATHY: ICD-10-CM

## 2025-01-28 DIAGNOSIS — M48.062 SPINAL STENOSIS OF LUMBAR REGION WITH NEUROGENIC CLAUDICATION: ICD-10-CM

## 2025-01-28 DIAGNOSIS — M51.9 LUMBAR DISC DISORDER: ICD-10-CM

## 2025-01-28 DIAGNOSIS — I10 ESSENTIAL HYPERTENSION: ICD-10-CM

## 2025-01-28 DIAGNOSIS — M47.816 LUMBAR SPONDYLOSIS: ICD-10-CM

## 2025-01-28 DIAGNOSIS — M54.16 LUMBAR RADICULOPATHY: ICD-10-CM

## 2025-01-28 DIAGNOSIS — G89.4 CHRONIC PAIN SYNDROME: Primary | ICD-10-CM

## 2025-01-28 PROCEDURE — 99214 OFFICE O/P EST MOD 30 MIN: CPT | Performed by: PAIN MEDICINE

## 2025-01-28 PROCEDURE — 3074F SYST BP LT 130 MM HG: CPT | Performed by: PAIN MEDICINE

## 2025-01-28 PROCEDURE — 1123F ACP DISCUSS/DSCN MKR DOCD: CPT | Performed by: PAIN MEDICINE

## 2025-01-28 PROCEDURE — 3078F DIAST BP <80 MM HG: CPT | Performed by: PAIN MEDICINE

## 2025-01-28 RX ORDER — EVOLOCUMAB 140 MG/ML
140 INJECTION, SOLUTION SUBCUTANEOUS
Qty: 2.1 ML | Refills: 5 | Status: SHIPPED
Start: 2025-01-28 | End: 2025-01-28

## 2025-01-28 RX ORDER — EVOLOCUMAB 140 MG/ML
INJECTION, SOLUTION SUBCUTANEOUS
Qty: 2 ML | Refills: 0 | Status: SHIPPED | OUTPATIENT
Start: 2025-01-28

## 2025-01-28 RX ORDER — FUROSEMIDE 20 MG/1
20 TABLET ORAL DAILY
Qty: 90 TABLET | Refills: 0 | Status: SHIPPED | OUTPATIENT
Start: 2025-01-28

## 2025-01-28 RX ORDER — SODIUM CHLORIDE 0.9 % (FLUSH) 0.9 %
5-40 SYRINGE (ML) INJECTION PRN
OUTPATIENT
Start: 2025-01-28

## 2025-01-28 RX ORDER — SODIUM CHLORIDE 9 MG/ML
INJECTION, SOLUTION INTRAVENOUS PRN
OUTPATIENT
Start: 2025-01-28

## 2025-01-28 RX ORDER — METOPROLOL SUCCINATE 50 MG/1
50 TABLET, EXTENDED RELEASE ORAL 2 TIMES DAILY
Qty: 180 TABLET | Refills: 0 | Status: SHIPPED | OUTPATIENT
Start: 2025-01-28

## 2025-01-28 RX ORDER — PANTOPRAZOLE SODIUM 40 MG/1
40 TABLET, DELAYED RELEASE ORAL DAILY
Qty: 90 TABLET | Refills: 0 | Status: SHIPPED | OUTPATIENT
Start: 2025-01-28

## 2025-01-28 RX ORDER — SODIUM CHLORIDE 0.9 % (FLUSH) 0.9 %
5-40 SYRINGE (ML) INJECTION EVERY 12 HOURS SCHEDULED
OUTPATIENT
Start: 2025-01-28

## 2025-01-28 NOTE — PROGRESS NOTES
Kathi Elizabeth presents to the [facility] on 1/28/2025. Kathi is complaining of pain lower back radiating to BLE. The pain is persistent. The pain is described as aching and grabbing. Pain is rated on her best day at a 2, on her worst day at a 8, and on average at a 6 on the VAS scale. She took her last dose of Motrin and Tylenol today.      Any procedures since your last visit: Yes, with 60 % relief for 4 months    She is  on NSAIDS and  is  on anticoagulation medications to include ASA and is managed by Dr. Bruce. -Cardiology    Pacemaker or defibrillator: No     Medication Contract and Consent for Opioid Use Documents Filed        No documents found                       Resp 18   Ht 1.626 m (5' 4.02\")   Wt 101.6 kg (224 lb)   BMI 38.43 kg/m²      No LMP recorded. Patient has had a hysterectomy.

## 2025-01-28 NOTE — PROGRESS NOTES
[facility]  [address], [place] [address]  [phone]    Follow up Note      Kathi Elizabeth     Date of Visit:  1/28/2025    CC:  Patient presents for follow up   Chief Complaint   Patient presents with    Follow-up     Lumbar epidural steroid injection L3-4 with low volume  8/26/24       HPI:  Worsening low back pain with radiation to bilateral lower extremities, last seen 07/2024.  Pain is described as aching/intermittent and is aggravated by twisting/getting up from sitting position.  Appropriate analgesia with current medications regimen: N/A.    Change in quality of symptoms:no.    Medication side effects:not applicable .   Recent diagnostic testing:none.   Recent interventional procedures:none    She has been on anticoagulation medications to include ASA. The patient  has not been on herbal supplements.  The patient is not diabetic.     Imaging:   Lumbar spine MRI 2020:  Multilevel degenerative disc disease and multilevel degenerative facet  hypertrophy with canal stenosis at L3-4.     Previous treatments: Physical Therapy, Epidural Steroid Injection, and medications..         Potential Aberrant Drug-Related Behavior:    No    Urine Drug Screening:  None    OARRS report:  01/2025 consistent     Opioid Agreement:  Renewal date:N/A    Past Medical History:   Diagnosis Date    Arthritis     shoulders, hips and knees, fingers    Bronchitis     CAD (coronary artery disease)     some blockage, being treated with aspirin; Dr. Bruce last seen 1/3/22    GERD (gastroesophageal reflux disease)     Hyperlipidemia     Hypertension     Sinus infection 06/22/2022    LD of doxycline- 7/2/22     Past Surgical History:   Procedure Laterality Date    APPENDECTOMY      BLADDER SURGERY      BLADDER SUSPENSION      CARDIAC SURGERY      CHOLECYSTECTOMY      COLONOSCOPY  11/29/2012    diverticulosis, hemorrhoid    COLONOSCOPY  09/2015    CORONARY ANGIOPLASTY  10/11/2019

## 2025-01-28 NOTE — PROGRESS NOTES
Kathi Elizabeth presents to the [facility] on 1/28/2025. Kathi is complaining of pain in her lower back that radiates to BLE. The pain is {Desc; intermittent/persistent/constant:38462}. The pain is described as {PAIN ASSESSMENT:69640}. Pain is rated on her best day at a {HH 0-10:865184}, on her worst day at a {HH 0-10:504207}, and on average at a {HH 0-10:188899} on the VAS scale. She took her last dose of {Pain Meds:79953} ***.      Any procedures since your last visit: Yes, with *** % relief.    She {IS/IS NOT:19932} on NSAIDS and  {IS/IS NOT:19932} on anticoagulation medications to include {anticoagulants:34598} and is managed by ***.     Pacemaker or defibrillator: {YES/NO:12250} Physician managing device is ***.    Medication Contract and Consent for Opioid Use Documents Filed        No documents found                       Resp 18   Ht 1.626 m (5' 4.02\")   Wt 101.6 kg (224 lb)   BMI 38.43 kg/m²      No LMP recorded. Patient has had a hysterectomy.

## 2025-01-28 NOTE — TELEPHONE ENCOUNTER
Refill pantoprazole, furosemide, metoprolol succinate pharm [facility] [place]  She wanted to make sure all her blood pressure meds were sent in as well.. previously

## 2025-01-28 NOTE — TELEPHONE ENCOUNTER
Call to Kathi Elizabeth that procedure was scheduled for 02/03/2025 and that [facility] should call her a few days before for the pre op call and after 3:00 PM the business day before with the arrival time.  Instructed to call office back if any questions. Kathi verbalized understanding.    Electronically signed by Amanda Muse RN on 1/28/2025 at 1:57 PM

## 2025-02-03 ENCOUNTER — HOSPITAL ENCOUNTER (OUTPATIENT)
Age: 85
Setting detail: OUTPATIENT SURGERY
Discharge: HOME | End: 2025-02-03
Attending: PAIN MEDICINE | Admitting: PAIN MEDICINE
Payer: MEDICARE

## 2025-02-03 ENCOUNTER — HOSPITAL ENCOUNTER (OUTPATIENT)
Dept: OPERATING ROOM | Age: 85
Setting detail: OUTPATIENT SURGERY
Discharge: HOME | End: 2025-02-03
Attending: PAIN MEDICINE
Payer: MEDICARE

## 2025-02-03 VITALS
OXYGEN SATURATION: 99 % | HEIGHT: 64 IN | WEIGHT: 224 LBS | SYSTOLIC BLOOD PRESSURE: 185 MMHG | TEMPERATURE: 98.3 F | RESPIRATION RATE: 14 BRPM | HEART RATE: 68 BPM | DIASTOLIC BLOOD PRESSURE: 90 MMHG | BODY MASS INDEX: 38.24 KG/M2

## 2025-02-03 DIAGNOSIS — M47.816 LUMBAR SPONDYLOSIS: ICD-10-CM

## 2025-02-03 PROCEDURE — 2709999900 HC NON-CHARGEABLE SUPPLY: Performed by: PAIN MEDICINE

## 2025-02-03 PROCEDURE — 6360000002 HC RX W HCPCS: Performed by: PAIN MEDICINE

## 2025-02-03 PROCEDURE — 64494 INJ PARAVERT F JNT L/S 2 LEV: CPT | Performed by: PAIN MEDICINE

## 2025-02-03 PROCEDURE — 3600000015 HC SURGERY LEVEL 5 ADDTL 15MIN: Performed by: PAIN MEDICINE

## 2025-02-03 PROCEDURE — 7100000010 HC PHASE II RECOVERY - FIRST 15 MIN: Performed by: PAIN MEDICINE

## 2025-02-03 PROCEDURE — 3600000005 HC SURGERY LEVEL 5 BASE: Performed by: PAIN MEDICINE

## 2025-02-03 PROCEDURE — 7100000011 HC PHASE II RECOVERY - ADDTL 15 MIN: Performed by: PAIN MEDICINE

## 2025-02-03 PROCEDURE — 64493 INJ PARAVERT F JNT L/S 1 LEV: CPT | Performed by: PAIN MEDICINE

## 2025-02-03 RX ORDER — SODIUM CHLORIDE 0.9 % (FLUSH) 0.9 %
5-40 SYRINGE (ML) INJECTION EVERY 12 HOURS SCHEDULED
Status: DISCONTINUED | OUTPATIENT
Start: 2025-02-03 | End: 2025-02-03 | Stop reason: HOSPADM

## 2025-02-03 RX ORDER — SODIUM CHLORIDE 0.9 % (FLUSH) 0.9 %
5-40 SYRINGE (ML) INJECTION PRN
Status: DISCONTINUED | OUTPATIENT
Start: 2025-02-03 | End: 2025-02-03 | Stop reason: HOSPADM

## 2025-02-03 RX ORDER — LIDOCAINE HYDROCHLORIDE 5 MG/ML
INJECTION, SOLUTION INFILTRATION; INTRAVENOUS PRN
Status: DISCONTINUED | OUTPATIENT
Start: 2025-02-03 | End: 2025-02-03 | Stop reason: ALTCHOICE

## 2025-02-03 RX ORDER — SODIUM CHLORIDE 9 MG/ML
INJECTION, SOLUTION INTRAVENOUS PRN
Status: DISCONTINUED | OUTPATIENT
Start: 2025-02-03 | End: 2025-02-03 | Stop reason: HOSPADM

## 2025-02-03 ASSESSMENT — PAIN - FUNCTIONAL ASSESSMENT
PAIN_FUNCTIONAL_ASSESSMENT: NONE - DENIES PAIN
PAIN_FUNCTIONAL_ASSESSMENT: NONE - DENIES PAIN
PAIN_FUNCTIONAL_ASSESSMENT: 0-10
PAIN_FUNCTIONAL_ASSESSMENT: NONE - DENIES PAIN

## 2025-02-03 NOTE — DISCHARGE INSTRUCTIONS
[facility]  [phone]   Post-Pain Block/ Radiofrequency Home Going Instructions    1-Go home, rest for the remainder of the day  2-Please do not lift over 20 pounds the day of the injection  3-If you received sedation No: alcohol, driving, operating lawn mowers, plows, tractors or other dangerous equipment until next morning. Do not make important decisions or sign legal documents for 24 hours. You may experience light headedness, dizziness, nausea or sleepiness after sedation. Do not stay alone. A responsible adult must be with you for 24 hours. You could be nauseated from the medications you have received. Your IV site may be sore and bruised.    4-No dietary restrictions     5-Resume all medications the same day, blood thinners to be resumed 24 hours after injection    6-Keep the surgical site clean and dry, you may shower the next morning and remove the      dressing.     7- No sitz baths, tub baths or hot tubs/swimming for 24 hours.       8- If you have any pain at the injection site(s), application of an ice pack to the area should be       helpful, 20 minutes on/20 minutes off for next 48 hours.  9- Call [facility] pain management immediately at if you develop.  Fever greater than 100.4 F  Have bleeding or drainage from the puncture site  Have progressive Leg/arm numbness and or weakness  Loss of control of bowel and or bladder (wet/soil yourself)  Severe headache with inability to lift head  10-You may return to work the next day         Infection After Surgery: Care Instructions  Overview  After surgery, an infection is always possible. It doesn't mean that the surgery didn't go well.  Because an infection can be serious, your doctor has taken steps to manage it.  Your doctor checked the infection and cleaned it if necessary. Your doctor may have made an opening in the area so that the pus can drain out. You may have gauze in the cut so that the area will stay open and keep

## 2025-02-03 NOTE — H&P
Socioeconomic History    Marital status:      Spouse name: Not on file    Number of children: Not on file    Years of education: Not on file    Highest education level: Not on file   Occupational History    Not on file   Tobacco Use    Smoking status: Former     Current packs/day: 0.00     Average packs/day: 1 pack/day for 37.0 years (37.0 ttl pk-yrs)     Types: Cigarettes     Start date:      Quit date:      Years since quittin.1    Smokeless tobacco: Never   Vaping Use    Vaping status: Never Used   Substance and Sexual Activity    Alcohol use: No    Drug use: No    Sexual activity: Not on file   Other Topics Concern    Not on file   Social History Narrative    Not on file     Social Determinants of Health     Financial Resource Strain: Low Risk  (2024)    Overall Financial Resource Strain (CARDIA)     Difficulty of Paying Living Expenses: Not very hard   Food Insecurity: No Food Insecurity (2024)    Hunger Vital Sign     Worried About Running Out of Food in the Last Year: Never true     Ran Out of Food in the Last Year: Never true   Transportation Needs: Unknown (2024)    PRAPARE - Transportation     Lack of Transportation (Medical): Not on file     Lack of Transportation (Non-Medical): No   Physical Activity: Not on file   Stress: Not on file   Social Connections: Not on file   Intimate Partner Violence: Not on file   Housing Stability: Unknown (2024)    Housing Stability Vital Sign     Unable to Pay for Housing in the Last Year: Not on file     Number of Places Lived in the Last Year: Not on file     Unstable Housing in the Last Year: No       Family History   Problem Relation Age of Onset    Cancer Mother     Stroke Father          REVIEW OF SYSTEMS:    CONSTITUTIONAL:  negative for  fevers, chills, sweats and fatigue    RESPIRATORY:  negative for  dry cough, cough with sputum, dyspnea, wheezing and chest pain    CARDIOVASCULAR:  negative for chest pain, dyspnea,

## 2025-02-03 NOTE — OP NOTE
2/3/2025    Patient: Kathi Elizabeth  :  1940  Age:  84 y.o.  Sex:  female     PRE-OPERATIVE DIAGNOSIS: Bilateral Lumbar spondylosis, lumbar facet syndrome.    POST-OPERATIVE DIAGNOSIS: Same.    PROCEDURE:  # 1 Fluoroscopic guided lumbar medial branch blocks Bilateral at Levels: L3, L4, L5 MB.    SURGEON: Joann BOWLES    ANESTHESIA: Local    ESTIMATED BLOOD LOSS: None.  ______________________________________________________________________  BRIEF HISTORY:  Kathi Elizabeth comes in today for 1 fluoroscopic guided lumbar medial branch blocks  Bilateral  at Levels: L3, L4, L5 MB.  The potential complications of this procedure were discussed with her again today. She has elected to undergo the aforementioned procedure.     Kathi’s complete History & Physical examination were reviewed in depth, a copy of which is in the chart.    DESCRIPTION OF PROCEDURE:   After confirming written and informed consent, a time-out was performed and Kathi’s name and date of birth, the procedure to be performed as well as the plan for the location of the needle insertion were confirmed.    The patient was brought into the procedure room and placed in the prone position on the fluoroscopy table. Standard monitors were placed and vital signs were observed throughout the procedure. The area of the lumbar spine was prepped with chloraprep and draped in a sterile manner. AP fluoroscopy was used to identify and jono bartons point at the targeted levels.The skin and subcutaneous tissues in these identified areas were anesthetized with 0.5%Lidocaine. A 22 # gauge 5 inch spinal needle was advanced toward the junction of the superior articular process and the transverse process, along the course of the medial branch. Satisfactory needle placement was confirmed by AP and oblique projections.  At the sacral alar level, the sacral alar region was visualized and the needle tip was positioned on the sacral alar at the base of the superior

## 2025-02-18 ENCOUNTER — OFFICE VISIT (OUTPATIENT)
Dept: PAIN MANAGEMENT | Age: 85
End: 2025-02-18
Payer: MEDICARE

## 2025-02-18 VITALS
TEMPERATURE: 96.9 F | HEIGHT: 64 IN | SYSTOLIC BLOOD PRESSURE: 114 MMHG | HEART RATE: 76 BPM | BODY MASS INDEX: 38.24 KG/M2 | WEIGHT: 224 LBS | DIASTOLIC BLOOD PRESSURE: 66 MMHG | RESPIRATION RATE: 18 BRPM | OXYGEN SATURATION: 96 %

## 2025-02-18 DIAGNOSIS — G89.4 CHRONIC PAIN SYNDROME: Primary | ICD-10-CM

## 2025-02-18 DIAGNOSIS — M51.9 LUMBAR DISC DISORDER: ICD-10-CM

## 2025-02-18 DIAGNOSIS — M48.062 SPINAL STENOSIS OF LUMBAR REGION WITH NEUROGENIC CLAUDICATION: ICD-10-CM

## 2025-02-18 DIAGNOSIS — M47.816 LUMBAR SPONDYLOSIS: ICD-10-CM

## 2025-02-18 DIAGNOSIS — M47.816 LUMBAR FACET ARTHROPATHY: ICD-10-CM

## 2025-02-18 DIAGNOSIS — M54.16 LUMBAR RADICULOPATHY: ICD-10-CM

## 2025-02-18 PROCEDURE — 3074F SYST BP LT 130 MM HG: CPT | Performed by: PAIN MEDICINE

## 2025-02-18 PROCEDURE — 3078F DIAST BP <80 MM HG: CPT | Performed by: PAIN MEDICINE

## 2025-02-18 PROCEDURE — 99213 OFFICE O/P EST LOW 20 MIN: CPT | Performed by: PAIN MEDICINE

## 2025-02-18 PROCEDURE — 1123F ACP DISCUSS/DSCN MKR DOCD: CPT | Performed by: PAIN MEDICINE

## 2025-02-18 PROCEDURE — 1159F MED LIST DOCD IN RCRD: CPT | Performed by: PAIN MEDICINE

## 2025-02-18 PROCEDURE — 1160F RVW MEDS BY RX/DR IN RCRD: CPT | Performed by: PAIN MEDICINE

## 2025-02-18 NOTE — PROGRESS NOTES
[facility]  [address], [place] [address]  [phone]    Follow up Note      Kathi LAMA Clara     Date of Visit:  2/18/2025    CC:  Patient presents for follow up   Chief Complaint   Patient presents with    Follow-up     HPI:  Follow upon her low back pain with radiation to bilateral lower extremities an no acute issues.  Appropriate analgesia with current medications regimen: N/A.    Change in quality of symptoms:no.    Medication side effects:not applicable .   Recent diagnostic testing:none.   Recent interventional procedures:bilateral L3,4,5 MBB with more than 80% improvement in her pain     She has been on anticoagulation medications to include ASA. The patient  has not been on herbal supplements.  The patient is not diabetic.     Imaging:   Lumbar spine MRI 2020:  Multilevel degenerative disc disease and multilevel degenerative facet  hypertrophy with canal stenosis at L3-4.     Previous treatments: Physical Therapy, Epidural Steroid Injection, and medications..         Potential Aberrant Drug-Related Behavior:    No    Urine Drug Screening:  None    OARRS report:  02/2025 consistent     Opioid Agreement:  Renewal date:N/A    Past Medical History:   Diagnosis Date    Arthritis     shoulders, hips and knees, fingers    Bronchitis     CAD (coronary artery disease)     some blockage, being treated with aspirin; Dr. Bruce last seen 1/3/22    GERD (gastroesophageal reflux disease)     Hyperlipidemia     Hypertension     Sinus infection 06/22/2022    LD of doxycline- 7/2/22     Past Surgical History:   Procedure Laterality Date    APPENDECTOMY      BLADDER SURGERY      BLADDER SUSPENSION      CARDIAC SURGERY      CHOLECYSTECTOMY      COLONOSCOPY  11/29/2012    diverticulosis, hemorrhoid    COLONOSCOPY  09/2015    CORONARY ANGIOPLASTY  10/11/2019    Angioplasty LAD by Dr Bruce     CORONARY ARTERY BYPASS GRAFT  06/17/2019    Bypass x4.  [facility].  LIMB-LAD, SVG-OM1

## 2025-02-18 NOTE — PROGRESS NOTES
Kathi Elizabeth presents to the [facility] on 2/18/2025. Kathi is complaining of pain back. The pain is intermittent. The pain is described as sharp. Pain is rated on her best day at a 4, on her worst day at a 7, and on average at a 5 on the VAS scale. She took her last dose of Mobic and Tylenol 1 hour ago.      Any procedures since your last visit: No    She is  on NSAIDS and  is  on anticoagulation medications to include ASA and is managed by Dr Kennedy.     Pacemaker or defibrillator: No    Do you want someone present when the provider examines you? No    Medication Contract and Consent for Opioid Use Documents Filed        No documents found                       Temp 96.9 °F (36.1 °C) (Infrared)      No LMP recorded. Patient has had a hysterectomy.

## 2025-03-06 ENCOUNTER — OFFICE VISIT (OUTPATIENT)
Dept: PRIMARY CARE CLINIC | Age: 85
End: 2025-03-06

## 2025-03-06 VITALS
OXYGEN SATURATION: 96 % | HEART RATE: 78 BPM | WEIGHT: 221.6 LBS | HEIGHT: 64 IN | SYSTOLIC BLOOD PRESSURE: 128 MMHG | TEMPERATURE: 97.5 F | DIASTOLIC BLOOD PRESSURE: 82 MMHG | BODY MASS INDEX: 37.83 KG/M2

## 2025-03-06 DIAGNOSIS — I25.728 CORONARY ARTERY DISEASE OF AUTOLOGOUS BYPASS GRAFT WITH STABLE ANGINA PECTORIS: ICD-10-CM

## 2025-03-06 DIAGNOSIS — J31.0 CHRONIC RHINITIS: ICD-10-CM

## 2025-03-06 DIAGNOSIS — I10 ESSENTIAL HYPERTENSION: ICD-10-CM

## 2025-03-06 DIAGNOSIS — I50.31 DIASTOLIC CHF, ACUTE (HCC): ICD-10-CM

## 2025-03-06 DIAGNOSIS — Z00.00 WELCOME TO MEDICARE PREVENTIVE VISIT: Primary | ICD-10-CM

## 2025-03-06 RX ORDER — METOPROLOL SUCCINATE 50 MG/1
50 TABLET, EXTENDED RELEASE ORAL 2 TIMES DAILY
Qty: 180 TABLET | Refills: 1 | Status: SHIPPED | OUTPATIENT
Start: 2025-03-06

## 2025-03-06 RX ORDER — PANTOPRAZOLE SODIUM 40 MG/1
40 TABLET, DELAYED RELEASE ORAL DAILY
Qty: 90 TABLET | Refills: 1 | Status: SHIPPED | OUTPATIENT
Start: 2025-03-06

## 2025-03-06 RX ORDER — ISOSORBIDE MONONITRATE 30 MG/1
30 TABLET, EXTENDED RELEASE ORAL DAILY
Qty: 90 TABLET | Refills: 1 | Status: SHIPPED | OUTPATIENT
Start: 2025-03-06

## 2025-03-06 RX ORDER — MONTELUKAST SODIUM 10 MG/1
10 TABLET ORAL DAILY
Qty: 90 TABLET | Refills: 1 | Status: SHIPPED | OUTPATIENT
Start: 2025-03-06

## 2025-03-06 RX ORDER — FUROSEMIDE 20 MG/1
20 TABLET ORAL DAILY
Qty: 90 TABLET | Refills: 1 | Status: SHIPPED | OUTPATIENT
Start: 2025-03-06

## 2025-03-06 RX ORDER — LORATADINE 10 MG/1
10 TABLET ORAL DAILY
Qty: 90 TABLET | Refills: 1 | Status: SHIPPED | OUTPATIENT
Start: 2025-03-06

## 2025-03-06 SDOH — ECONOMIC STABILITY: FOOD INSECURITY: WITHIN THE PAST 12 MONTHS, YOU WORRIED THAT YOUR FOOD WOULD RUN OUT BEFORE YOU GOT MONEY TO BUY MORE.: NEVER TRUE

## 2025-03-06 SDOH — ECONOMIC STABILITY: FOOD INSECURITY: WITHIN THE PAST 12 MONTHS, THE FOOD YOU BOUGHT JUST DIDN'T LAST AND YOU DIDN'T HAVE MONEY TO GET MORE.: NEVER TRUE

## 2025-03-06 ASSESSMENT — PATIENT HEALTH QUESTIONNAIRE - PHQ9
SUM OF ALL RESPONSES TO PHQ QUESTIONS 1-9: 0
SUM OF ALL RESPONSES TO PHQ QUESTIONS 1-9: 1
2. FEELING DOWN, DEPRESSED OR HOPELESS: NOT AT ALL
SUM OF ALL RESPONSES TO PHQ QUESTIONS 1-9: 0
SUM OF ALL RESPONSES TO PHQ QUESTIONS 1-9: 0
SUM OF ALL RESPONSES TO PHQ QUESTIONS 1-9: 1
2. FEELING DOWN, DEPRESSED OR HOPELESS: SEVERAL DAYS
1. LITTLE INTEREST OR PLEASURE IN DOING THINGS: NOT AT ALL
SUM OF ALL RESPONSES TO PHQ QUESTIONS 1-9: 1
SUM OF ALL RESPONSES TO PHQ QUESTIONS 1-9: 0
SUM OF ALL RESPONSES TO PHQ QUESTIONS 1-9: 1

## 2025-03-06 ASSESSMENT — LIFESTYLE VARIABLES
HOW OFTEN DO YOU HAVE A DRINK CONTAINING ALCOHOL: NEVER
HOW MANY STANDARD DRINKS CONTAINING ALCOHOL DO YOU HAVE ON A TYPICAL DAY: PATIENT DOES NOT DRINK

## 2025-03-06 NOTE — PATIENT INSTRUCTIONS
Eating Healthy Foods: Care Instructions  With every meal, you can make healthy food choices. Try to eat a variety of fruits, vegetables, whole grains, lean proteins, and low-fat dairy products. This can help you get the right balance of nutrients, including vitamins and minerals. Small changes add up over time. You can start by adding one healthy food to your meals each day.    Try to make half your plate fruits and vegetables, one-fourth whole grains, and one-fourth lean proteins. Try including dairy with your meals.   Eat more fruits and vegetables. Try to have them with most meals and snacks.   Foods for healthy eating        Fruits   These can be fresh, frozen, canned, or dried.  Try to choose whole fruit rather than fruit juice.  Eat a variety of colors.        Vegetables   These can be fresh, frozen, canned, or dried.  Beans, peas, and lentils count too.        Whole grains   Choose whole-grain breads, cereals, and noodles.  Try brown rice.        Lean proteins   These can include lean meat, poultry, fish, and eggs.  You can also have tofu, beans, peas, lentils, nuts, and seeds.        Dairy   Try milk, yogurt, and cheese.  Choose low-fat or fat-free when you can.  If you need to, use lactose-free milk or fortified plant-based milk products, such as soy milk.        Water   Drink water when you're thirsty.  Limit sugar-sweetened drinks, including soda, fruit drinks, and sports drinks.  Where can you learn more?  Go to https://www.[employer].net/patientEd and enter T756 to learn more about \"Eating Healthy Foods: Care Instructions.\"  Current as of: September 20, 2023  Content Version: 14.3  © 2024 [employer].   Care instructions adapted under license by [employer]. If you have questions about a medical condition or this instruction, always ask your healthcare professional. [employer], disclaims any warranty or liability for your use of this information.         Starting a Weight-Loss

## 2025-03-06 NOTE — PROGRESS NOTES
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     Subjective       Patient's complete Health Risk Assessment and screening values have been reviewed and are found in Flowsheets. The following problems were reviewed today and where indicated follow up appointments were made and/or referrals ordered.    Positive Risk Factor Screenings with Interventions:    Fall Risk:  Do you feel unsteady or are you worried about falling? : (!) yes  2 or more falls in past year?: no  Fall with injury in past year?: no     Interventions:    Reviewed medications, home hazards, visual acuity, and co-morbidities that can increase risk for falls  Recommended Assisted Device uses a cane     Depression:  Depression Unable to Assess: Functional capacity motivation limits accuracy  PHQ-2 Score: 1  PHQ-9 Total Score: 0               General HRA Questions:  Select all that apply: (!) New or Increased Pain    Interventions - Pain:  Referred to: Pain Specialist  Chief Complaint   Patient presents with    Medicare AWV     Medicare wellness exam and patient is having pain in her right hip and knee , pain level 5/10       HPI:  Patient is here for follow-up .  Denied cardiopulmonary

## 2025-03-27 ENCOUNTER — TELEPHONE (OUTPATIENT)
Dept: CARDIOLOGY CLINIC | Age: 85
End: 2025-03-27

## 2025-03-31 ENCOUNTER — TELEPHONE (OUTPATIENT)
Dept: CARDIOLOGY CLINIC | Age: 85
End: 2025-03-31

## 2025-04-01 ENCOUNTER — OFFICE VISIT (OUTPATIENT)
Dept: CARDIOLOGY CLINIC | Age: 85
End: 2025-04-01
Payer: MEDICARE

## 2025-04-01 VITALS
HEIGHT: 64 IN | BODY MASS INDEX: 37.9 KG/M2 | WEIGHT: 222 LBS | DIASTOLIC BLOOD PRESSURE: 82 MMHG | RESPIRATION RATE: 16 BRPM | HEART RATE: 74 BPM | SYSTOLIC BLOOD PRESSURE: 136 MMHG

## 2025-04-01 DIAGNOSIS — I25.10 CORONARY ARTERY DISEASE INVOLVING NATIVE CORONARY ARTERY OF NATIVE HEART WITHOUT ANGINA PECTORIS: Primary | ICD-10-CM

## 2025-04-01 PROCEDURE — 93000 ELECTROCARDIOGRAM COMPLETE: CPT | Performed by: INTERNAL MEDICINE

## 2025-04-01 PROCEDURE — 3075F SYST BP GE 130 - 139MM HG: CPT | Performed by: INTERNAL MEDICINE

## 2025-04-01 PROCEDURE — 1123F ACP DISCUSS/DSCN MKR DOCD: CPT | Performed by: INTERNAL MEDICINE

## 2025-04-01 PROCEDURE — 1160F RVW MEDS BY RX/DR IN RCRD: CPT | Performed by: INTERNAL MEDICINE

## 2025-04-01 PROCEDURE — 1159F MED LIST DOCD IN RCRD: CPT | Performed by: INTERNAL MEDICINE

## 2025-04-01 PROCEDURE — 3079F DIAST BP 80-89 MM HG: CPT | Performed by: INTERNAL MEDICINE

## 2025-04-01 PROCEDURE — 99213 OFFICE O/P EST LOW 20 MIN: CPT | Performed by: INTERNAL MEDICINE

## 2025-04-01 NOTE — PROGRESS NOTES
[facility] Cardiology Progress Note  Dr. Jaimie Bruce      Referring Physician: Homer Padgett MD  CHIEF COMPLAINT: CAD    Chief Complaint   Patient presents with    Coronary Artery Disease     6 months        HISTORY OF PRESENT ILLNESS:    Patient is an 84 year old female with history of coronary artery disease, hyperlipidemia, diabetes mellitus, GERD, diastolic CHF, pulmonary embolism, is here for follow-up appointment.  Patient denies any chest pain, no shortness of breath, no lightheadedness, no dizziness, no palpitations, no pedal edema, no PND, no orthopnea, no syncope, no presyncopal episodes.  Functional capacity is at baseline, lives by herself and takes care of her activities of daily living    Past Medical History:   Diagnosis Date    Arthritis     shoulders, hips and knees, fingers    Bronchitis     CAD (coronary artery disease)     some blockage, being treated with aspirin; Dr. Bruce last seen 1/3/22    GERD (gastroesophageal reflux disease)     Hyperlipidemia     Hypertension     Sinus infection 06/22/2022    LD of doxycline- 7/2/22         Past Surgical History:   Procedure Laterality Date    APPENDECTOMY      BLADDER SURGERY      BLADDER SUSPENSION      CARDIAC SURGERY      CHOLECYSTECTOMY      COLONOSCOPY  11/29/2012    diverticulosis, hemorrhoid    COLONOSCOPY  09/2015    CORONARY ANGIOPLASTY  10/11/2019    Angioplasty LAD by Dr Bruce     CORONARY ARTERY BYPASS GRAFT  06/17/2019    Bypass x4.  [facility].  LIMB-LAD, SVG-OM1 SVG-OM2 SVG-RCA; Dr. Bruce- last seen 1/3/22    CYST REMOVAL      back    DILATION AND CURETTAGE OF UTERUS      HYSTERECTOMY (CERVIX STATUS UNKNOWN)      INTRACAPSULAR CATARACT EXTRACTION Left 07/13/2022    CATARACT EXTRACTION WITH INTRAOCULAR LENS IMPLANT LEFT EYE performed by Vernon Ventura DO at [facility] OR    PAIN MANAGEMENT PROCEDURE N/A 03/18/2024    Lumbar epidural steroid injection L3-4 with low volume. performed by Julian Story MD at [facility] OR

## 2025-04-04 RX ORDER — EVOLOCUMAB 140 MG/ML
INJECTION, SOLUTION SUBCUTANEOUS
Qty: 2 ML | Refills: 5 | Status: SHIPPED | OUTPATIENT
Start: 2025-04-04

## 2025-04-15 ENCOUNTER — OFFICE VISIT (OUTPATIENT)
Dept: PAIN MANAGEMENT | Age: 85
End: 2025-04-15
Payer: MEDICARE

## 2025-04-15 VITALS
BODY MASS INDEX: 37.9 KG/M2 | RESPIRATION RATE: 18 BRPM | HEART RATE: 62 BPM | DIASTOLIC BLOOD PRESSURE: 68 MMHG | TEMPERATURE: 96.8 F | SYSTOLIC BLOOD PRESSURE: 118 MMHG | OXYGEN SATURATION: 96 % | HEIGHT: 64 IN | WEIGHT: 222 LBS

## 2025-04-15 DIAGNOSIS — M48.062 SPINAL STENOSIS OF LUMBAR REGION WITH NEUROGENIC CLAUDICATION: ICD-10-CM

## 2025-04-15 DIAGNOSIS — M54.16 LUMBAR RADICULOPATHY: ICD-10-CM

## 2025-04-15 DIAGNOSIS — G89.4 CHRONIC PAIN SYNDROME: Primary | ICD-10-CM

## 2025-04-15 DIAGNOSIS — M47.816 LUMBAR FACET ARTHROPATHY: ICD-10-CM

## 2025-04-15 DIAGNOSIS — M47.816 LUMBAR SPONDYLOSIS: ICD-10-CM

## 2025-04-15 DIAGNOSIS — M51.9 LUMBAR DISC DISORDER: ICD-10-CM

## 2025-04-15 PROCEDURE — 1123F ACP DISCUSS/DSCN MKR DOCD: CPT | Performed by: PAIN MEDICINE

## 2025-04-15 PROCEDURE — 3078F DIAST BP <80 MM HG: CPT | Performed by: PAIN MEDICINE

## 2025-04-15 PROCEDURE — 3074F SYST BP LT 130 MM HG: CPT | Performed by: PAIN MEDICINE

## 2025-04-15 PROCEDURE — 1160F RVW MEDS BY RX/DR IN RCRD: CPT | Performed by: PAIN MEDICINE

## 2025-04-15 PROCEDURE — 99213 OFFICE O/P EST LOW 20 MIN: CPT | Performed by: PAIN MEDICINE

## 2025-04-15 PROCEDURE — 1159F MED LIST DOCD IN RCRD: CPT | Performed by: PAIN MEDICINE

## 2025-04-15 RX ORDER — SODIUM CHLORIDE 0.9 % (FLUSH) 0.9 %
5-40 SYRINGE (ML) INJECTION PRN
OUTPATIENT
Start: 2025-04-15

## 2025-04-15 RX ORDER — SODIUM CHLORIDE 0.9 % (FLUSH) 0.9 %
5-40 SYRINGE (ML) INJECTION EVERY 12 HOURS SCHEDULED
OUTPATIENT
Start: 2025-04-15

## 2025-04-15 RX ORDER — SODIUM CHLORIDE 9 MG/ML
INJECTION, SOLUTION INTRAVENOUS PRN
OUTPATIENT
Start: 2025-04-15

## 2025-04-15 NOTE — PROGRESS NOTES
[facility]  [address], [place] [address]  [phone]    Follow up Note      Kathi LAMA Clara     Date of Visit:  4/15/2025    CC:  Patient presents for follow up   Chief Complaint   Patient presents with    Lower Back Pain     HPI:  Follow upon her low back pain with radiation to bilateral lower extremities an no acute issues.  Appropriate analgesia with current medications regimen: N/A.    Change in quality of symptoms:no.    Medication side effects:not applicable .   Recent diagnostic testing:none.   Recent interventional procedures:none    She has been on anticoagulation medications to include ASA. The patient  has not been on herbal supplements.  The patient is not diabetic.     Imaging:   Lumbar spine MRI 2020:  Multilevel degenerative disc disease and multilevel degenerative facet  hypertrophy with canal stenosis at L3-4.     Previous treatments: Physical Therapy, Epidural Steroid Injection, and medications..         Potential Aberrant Drug-Related Behavior:    No    Urine Drug Screening:  None    OARRS report:  04/2025 consistent     Opioid Agreement:  Renewal date:N/A    Past Medical History:   Diagnosis Date    Arthritis     shoulders, hips and knees, fingers    Bronchitis     CAD (coronary artery disease)     some blockage, being treated with aspirin; Dr. Bruce last seen 1/3/22    GERD (gastroesophageal reflux disease)     Hyperlipidemia     Hypertension     Sinus infection 06/22/2022    LD of doxycline- 7/2/22     Past Surgical History:   Procedure Laterality Date    APPENDECTOMY      BLADDER SURGERY      BLADDER SUSPENSION      CARDIAC SURGERY      CHOLECYSTECTOMY      COLONOSCOPY  11/29/2012    diverticulosis, hemorrhoid    COLONOSCOPY  09/2015    CORONARY ANGIOPLASTY  10/11/2019    Angioplasty LAD by Dr Bruce     CORONARY ARTERY BYPASS GRAFT  06/17/2019    Bypass x4.  [facility].  LIMB-LAD, SVG-OM1 SVG-OM2 SVG-RCA; Dr. Bruce- last seen 1/3/22    CYST

## 2025-04-15 NOTE — PROGRESS NOTES
Kathi Elizabeth presents to the [facility] on 4/15/2025. Kathi is complaining of pain in her lower back radiating to BLE. The pain is constant. The pain is described as aching. Pain is rated on her best day at a 4, on her worst day at a 8, and on average at a 5 on the VAS scale. She took her last dose of ibuprofen and tylenol today.      Any procedures since your last visit: No    She is  on NSAIDS and  is  on anticoagulation medications to include ASA and is managed by Dr. Bruce.     Pacemaker or defibrillator: No     Medication Contract and Consent for Opioid Use Documents Filed        No documents found                       Resp 18   Ht 1.626 m (5' 4.02\")   Wt 100.7 kg (222 lb)   BMI 38.09 kg/m²      No LMP recorded. Patient has had a hysterectomy.

## 2025-04-24 ENCOUNTER — TELEPHONE (OUTPATIENT)
Dept: PAIN MANAGEMENT | Age: 85
End: 2025-04-24

## 2025-04-24 NOTE — TELEPHONE ENCOUNTER
Call to Kathi Elizabeth that procedure was scheduled for 05/01/2025 and that [facility] should call her a few days before for the pre op call and after 3:00 PM the business day before with the arrival time. Instructed to call office back if any questions. Kathi verbalized understanding.    Electronically signed by Amanda Muse RN on 4/24/2025 at 2:58 PM

## 2025-04-25 ENCOUNTER — ANESTHESIA EVENT (OUTPATIENT)
Dept: OPERATING ROOM | Age: 85
End: 2025-04-25
Payer: MEDICARE

## 2025-04-30 ASSESSMENT — ENCOUNTER SYMPTOMS
DYSPNEA ACTIVITY LEVEL: NO INTERVAL CHANGE
SHORTNESS OF BREATH: 1

## 2025-04-30 ASSESSMENT — LIFESTYLE VARIABLES: SMOKING_STATUS: 0

## 2025-04-30 NOTE — ANESTHESIA PRE PROCEDURE
Department of Anesthesiology  Preprocedure Note       Name:  Kathi Elizabeth   Age:  84 y.o.  :  1940                                          MRN:  [id number]         Date:  2025      Surgeon: Surgeon(s):  Julian Story MD    Procedure: Procedure(s):  Repeat bilateral Lumbar 3,4,5 medial branch block. SEDATION    Medications prior to admission:   Prior to Admission medications    Medication Sig Start Date End Date Taking? Authorizing Provider   REPATHA SURECLICK 140 MG/ML SOAJ INJECT 1 ML SUBCUTANEOUSLY EVERY 2 WEEKS 25  Yes Jaimie Bruce MD   furosemide (LASIX) 20 MG tablet Take 1 tablet by mouth daily 3/6/25  Yes Homer Padgett MD   isosorbide mononitrate (IMDUR) 30 MG extended release tablet Take 1 tablet by mouth daily am 3/6/25  Yes Homer Padgett MD   loratadine (CLARITIN) 10 MG tablet Take 1 tablet by mouth daily 3/6/25  Yes Homer Padgett MD   metoprolol succinate (TOPROL XL) 50 MG extended release tablet Take 1 tablet by mouth 2 times daily 3/6/25  Yes Homer Padgett MD   montelukast (SINGULAIR) 10 MG tablet Take 1 tablet by mouth daily 3/6/25  Yes Homer Padgett MD   pantoprazole (PROTONIX) 40 MG tablet Take 1 tablet by mouth daily 3/6/25  Yes Homer Padgett MD   ibuprofen (ADVIL;MOTRIN) 200 MG tablet 1 tab bid 23  Yes Homer Padgett MD   aspirin 325 MG tablet Take 1 tablet by mouth daily   Yes Keysha Gilbert MD   mometasone (NASONEX) 50 MCG/ACT nasal spray 1 spray by Each Nostril route as needed   Yes Keysha Gilbert MD   Albuterol Sulfate (PROAIR HFA IN) Inhale 2 puffs into the lungs 4 times daily as needed   Yes Keysha Gilbert MD   nitroGLYCERIN (NITROSTAT) 0.4 MG SL tablet Place 1 tablet under the tongue every 5 minutes as needed for Chest pain up to max of 3 total doses. If no relief after 1 dose, call 911.   Yes Keysha Gilbert MD   diclofenac sodium (VOLTAREN) 1 % GEL APPLY 4 GRAMS TO THE AFFECTED AREA(S) BY TOPICAL ROUTE 2 TIMES PER

## 2025-05-01 ENCOUNTER — HOSPITAL ENCOUNTER (OUTPATIENT)
Age: 85
Setting detail: OUTPATIENT SURGERY
Discharge: HOME | End: 2025-05-01
Attending: PAIN MEDICINE | Admitting: PAIN MEDICINE
Payer: MEDICARE

## 2025-05-01 ENCOUNTER — HOSPITAL ENCOUNTER (OUTPATIENT)
Dept: OPERATING ROOM | Age: 85
Setting detail: OUTPATIENT SURGERY
Discharge: HOME | End: 2025-05-01
Attending: PAIN MEDICINE
Payer: MEDICARE

## 2025-05-01 ENCOUNTER — ANESTHESIA (OUTPATIENT)
Dept: OPERATING ROOM | Age: 85
End: 2025-05-01
Payer: MEDICARE

## 2025-05-01 VITALS
SYSTOLIC BLOOD PRESSURE: 126 MMHG | HEART RATE: 68 BPM | HEIGHT: 64 IN | BODY MASS INDEX: 37.05 KG/M2 | TEMPERATURE: 97.8 F | RESPIRATION RATE: 18 BRPM | WEIGHT: 217 LBS | DIASTOLIC BLOOD PRESSURE: 68 MMHG | OXYGEN SATURATION: 97 %

## 2025-05-01 DIAGNOSIS — M47.896 OTHER OSTEOARTHRITIS OF SPINE, LUMBAR REGION: ICD-10-CM

## 2025-05-01 PROCEDURE — 64494 INJ PARAVERT F JNT L/S 2 LEV: CPT | Performed by: PAIN MEDICINE

## 2025-05-01 PROCEDURE — 7100000011 HC PHASE II RECOVERY - ADDTL 15 MIN: Performed by: PAIN MEDICINE

## 2025-05-01 PROCEDURE — 6360000002 HC RX W HCPCS: Performed by: NURSE ANESTHETIST, CERTIFIED REGISTERED

## 2025-05-01 PROCEDURE — 3600000005 HC SURGERY LEVEL 5 BASE: Performed by: PAIN MEDICINE

## 2025-05-01 PROCEDURE — 7100000010 HC PHASE II RECOVERY - FIRST 15 MIN: Performed by: PAIN MEDICINE

## 2025-05-01 PROCEDURE — 3700000000 HC ANESTHESIA ATTENDED CARE: Performed by: PAIN MEDICINE

## 2025-05-01 PROCEDURE — 6360000002 HC RX W HCPCS: Performed by: PAIN MEDICINE

## 2025-05-01 PROCEDURE — 2709999900 HC NON-CHARGEABLE SUPPLY: Performed by: PAIN MEDICINE

## 2025-05-01 PROCEDURE — 64493 INJ PARAVERT F JNT L/S 1 LEV: CPT | Performed by: PAIN MEDICINE

## 2025-05-01 PROCEDURE — 2580000003 HC RX 258: Performed by: ANESTHESIOLOGY

## 2025-05-01 RX ORDER — LIDOCAINE HYDROCHLORIDE 5 MG/ML
INJECTION, SOLUTION INFILTRATION; INTRAVENOUS PRN
Status: DISCONTINUED | OUTPATIENT
Start: 2025-05-01 | End: 2025-05-01 | Stop reason: ALTCHOICE

## 2025-05-01 RX ORDER — SODIUM CHLORIDE 0.9 % (FLUSH) 0.9 %
5-40 SYRINGE (ML) INJECTION PRN
Status: DISCONTINUED | OUTPATIENT
Start: 2025-05-01 | End: 2025-05-01 | Stop reason: HOSPADM

## 2025-05-01 RX ORDER — MEPERIDINE HYDROCHLORIDE 25 MG/ML
12.5 INJECTION INTRAMUSCULAR; INTRAVENOUS; SUBCUTANEOUS ONCE
Status: DISCONTINUED | OUTPATIENT
Start: 2025-05-01 | End: 2025-05-01 | Stop reason: HOSPADM

## 2025-05-01 RX ORDER — FENTANYL CITRATE 50 UG/ML
INJECTION, SOLUTION INTRAMUSCULAR; INTRAVENOUS
Status: DISCONTINUED | OUTPATIENT
Start: 2025-05-01 | End: 2025-05-01 | Stop reason: SDUPTHER

## 2025-05-01 RX ORDER — SODIUM CHLORIDE, SODIUM LACTATE, POTASSIUM CHLORIDE, CALCIUM CHLORIDE 600; 310; 30; 20 MG/100ML; MG/100ML; MG/100ML; MG/100ML
INJECTION, SOLUTION INTRAVENOUS CONTINUOUS
Status: DISCONTINUED | OUTPATIENT
Start: 2025-05-01 | End: 2025-05-01 | Stop reason: HOSPADM

## 2025-05-01 RX ORDER — SODIUM CHLORIDE 9 MG/ML
INJECTION, SOLUTION INTRAVENOUS PRN
Status: DISCONTINUED | OUTPATIENT
Start: 2025-05-01 | End: 2025-05-01 | Stop reason: HOSPADM

## 2025-05-01 RX ORDER — NALOXONE HYDROCHLORIDE 0.4 MG/ML
INJECTION, SOLUTION INTRAMUSCULAR; INTRAVENOUS; SUBCUTANEOUS PRN
Status: DISCONTINUED | OUTPATIENT
Start: 2025-05-01 | End: 2025-05-01 | Stop reason: HOSPADM

## 2025-05-01 RX ORDER — SODIUM CHLORIDE 0.9 % (FLUSH) 0.9 %
5-40 SYRINGE (ML) INJECTION EVERY 12 HOURS SCHEDULED
Status: DISCONTINUED | OUTPATIENT
Start: 2025-05-01 | End: 2025-05-01 | Stop reason: HOSPADM

## 2025-05-01 RX ORDER — DIPHENHYDRAMINE HYDROCHLORIDE 50 MG/ML
12.5 INJECTION, SOLUTION INTRAMUSCULAR; INTRAVENOUS
Status: DISCONTINUED | OUTPATIENT
Start: 2025-05-01 | End: 2025-05-01 | Stop reason: HOSPADM

## 2025-05-01 RX ORDER — DROPERIDOL 2.5 MG/ML
0.62 INJECTION, SOLUTION INTRAMUSCULAR; INTRAVENOUS
Status: DISCONTINUED | OUTPATIENT
Start: 2025-05-01 | End: 2025-05-01 | Stop reason: HOSPADM

## 2025-05-01 RX ADMIN — SODIUM CHLORIDE, POTASSIUM CHLORIDE, SODIUM LACTATE AND CALCIUM CHLORIDE: 600; 310; 30; 20 INJECTION, SOLUTION INTRAVENOUS at 11:44

## 2025-05-01 RX ADMIN — FENTANYL CITRATE 25 MCG: 50 INJECTION, SOLUTION INTRAMUSCULAR; INTRAVENOUS at 12:39

## 2025-05-01 ASSESSMENT — PAIN - FUNCTIONAL ASSESSMENT
PAIN_FUNCTIONAL_ASSESSMENT: NONE - DENIES PAIN
PAIN_FUNCTIONAL_ASSESSMENT: 0-10
PAIN_FUNCTIONAL_ASSESSMENT: NONE - DENIES PAIN
PAIN_FUNCTIONAL_ASSESSMENT: NONE - DENIES PAIN

## 2025-05-01 ASSESSMENT — PAIN DESCRIPTION - DESCRIPTORS: DESCRIPTORS: ACHING

## 2025-05-01 NOTE — ANESTHESIA POSTPROCEDURE EVALUATION
Department of Anesthesiology  Postprocedure Note    Patient: Kathi Elizabeth  MRN: [id number]  YOB: 1940  Date of evaluation: 5/1/2025    Procedure Summary       Date: 05/01/25 Room / Location: [address]    Anesthesia Start: 1234 Anesthesia Stop: 1249    Procedure: Repeat bilateral Lumbar 3,4,5 medial branch block. SEDATION (Bilateral: Back) Diagnosis:       Lumbar spondylosis      (Lumbar spondylosis [M47.816])    Surgeons: Julian Story MD Responsible Provider: Tom Shaver MD    Anesthesia Type: MAC ASA Status: 3            Anesthesia Type: No value filed.    Theresa Phase I: Theresa Score: 10    Theresa Phase II: Theresa Score: 9    Anesthesia Post Evaluation    Patient location during evaluation: PACU  Patient participation: complete - patient participated  Level of consciousness: awake and alert  Airway patency: patent  Nausea & Vomiting: no nausea and no vomiting  Cardiovascular status: hemodynamically stable  Respiratory status: room air and spontaneous ventilation  Hydration status: stable  Pain management: satisfactory to patient    No notable events documented.

## 2025-05-01 NOTE — OP NOTE
2025    Patient: Kathi Elizabeth  :  1940  Age:  84 y.o.  Sex:  female     PRE-OPERATIVE DIAGNOSIS: Bilateral Lumbar spondylosis, lumbar facet syndrome.    POST-OPERATIVE DIAGNOSIS: Same.    PROCEDURE:  # 2 Fluoroscopic guided lumbar medial branch blocks Bilateral at Levels: L3, L4, L5 MB.    SURGEON: Joann BOWLES    ANESTHESIA: MAC    ESTIMATED BLOOD LOSS: None.  ______________________________________________________________________  BRIEF HISTORY:  Kathi Elizabeth comes in today for 2 fluoroscopic guided lumbar medial branch blocks  Bilateral  at Levels: L3, L4, L5 MB.  The potential complications of this procedure were discussed with her again today. She has elected to undergo the aforementioned procedure.     Kathi’s complete History & Physical examination were reviewed in depth, a copy of which is in the chart.    DESCRIPTION OF PROCEDURE:   After confirming written and informed consent, a time-out was performed and Kathi’s name and date of birth, the procedure to be performed as well as the plan for the location of the needle insertion were confirmed.    The patient was brought into the procedure room and placed in the prone position on the fluoroscopy table. Standard monitors were placed and vital signs were observed throughout the procedure. The area of the lumbar spine was prepped with chloraprep and draped in a sterile manner. AP fluoroscopy was used to identify and jono bartons point at the targeted levels.The skin and subcutaneous tissues in these identified areas were anesthetized with 0.5%Lidocaine. A 22 # gauge 5 inch spinal needle was advanced toward the junction of the superior articular process and the transverse process, along the course of the medial branch. Satisfactory needle placement was confirmed by AP and oblique projections.  At the sacral alar level, the sacral alar region was visualized and the needle tip was positioned on the sacral alar at the base of the superior

## 2025-05-01 NOTE — H&P
[facility]  [address], [place] [address]  [phone]    Procedure History & Physical      Kathi Elizabeth     HPI:    Patient  is here for axial low back pain for repeat bilateral L3,4,5 MBB.  Labs/imaging studies reviewed   All question and concerns addressed including R/B/A associated with the procedure    Past Medical History:   Diagnosis Date    Arthritis     shoulders, hips and knees, fingers    Bronchitis     CAD (coronary artery disease)     some blockage, being treated with aspirin; Dr. Bruce last seen 1/3/22    GERD (gastroesophageal reflux disease)     Hyperlipidemia     Hypertension     Sinus infection 06/22/2022    LD of doxycline- 7/2/22       Past Surgical History:   Procedure Laterality Date    APPENDECTOMY      BLADDER SURGERY      BLADDER SUSPENSION      CARDIAC SURGERY      CHOLECYSTECTOMY      COLONOSCOPY  11/29/2012    diverticulosis, hemorrhoid    COLONOSCOPY  09/2015    CORONARY ANGIOPLASTY  10/11/2019    Angioplasty LAD by Dr Bruce     CORONARY ARTERY BYPASS GRAFT  06/17/2019    Bypass x4.  [facility].  LIMB-LAD, SVG-OM1 SVG-OM2 SVG-RCA; Dr. Bruce- last seen 1/3/22    CYST REMOVAL      back    DILATION AND CURETTAGE OF UTERUS      HYSTERECTOMY (CERVIX STATUS UNKNOWN)      INTRACAPSULAR CATARACT EXTRACTION Left 07/13/2022    CATARACT EXTRACTION WITH INTRAOCULAR LENS IMPLANT LEFT EYE performed by Vernon Ventura DO at [facility] OR    PAIN MANAGEMENT PROCEDURE N/A 03/18/2024    Lumbar epidural steroid injection L3-4 with low volume. performed by Julian Story MD at [facility] OR    PAIN MANAGEMENT PROCEDURE N/A 8/26/2024    Lumbar epidural steroid injection L3-4 with low volume.SEDATION performed by Julian Story MD at [facility] OR    PAIN MANAGEMENT PROCEDURE Bilateral 2/3/2025    Bilateral Lumbar 3,4,5 medial branch block. performed by Julian Story MD at [facility] OR       Prior to Admission medications    Medication Sig

## 2025-05-20 ENCOUNTER — OFFICE VISIT (OUTPATIENT)
Age: 85
End: 2025-05-20
Payer: MEDICARE

## 2025-05-20 VITALS
RESPIRATION RATE: 16 BRPM | BODY MASS INDEX: 37.05 KG/M2 | HEIGHT: 64 IN | WEIGHT: 217 LBS | OXYGEN SATURATION: 96 % | TEMPERATURE: 97.5 F | DIASTOLIC BLOOD PRESSURE: 60 MMHG | SYSTOLIC BLOOD PRESSURE: 102 MMHG | HEART RATE: 77 BPM

## 2025-05-20 DIAGNOSIS — G89.4 CHRONIC PAIN SYNDROME: Primary | ICD-10-CM

## 2025-05-20 DIAGNOSIS — M47.816 LUMBAR FACET ARTHROPATHY: ICD-10-CM

## 2025-05-20 DIAGNOSIS — M51.9 LUMBAR DISC DISORDER: ICD-10-CM

## 2025-05-20 DIAGNOSIS — M47.816 LUMBAR SPONDYLOSIS: ICD-10-CM

## 2025-05-20 DIAGNOSIS — M54.16 LUMBAR RADICULOPATHY: ICD-10-CM

## 2025-05-20 DIAGNOSIS — M48.062 SPINAL STENOSIS OF LUMBAR REGION WITH NEUROGENIC CLAUDICATION: ICD-10-CM

## 2025-05-20 PROCEDURE — 3078F DIAST BP <80 MM HG: CPT | Performed by: PAIN MEDICINE

## 2025-05-20 PROCEDURE — 1123F ACP DISCUSS/DSCN MKR DOCD: CPT | Performed by: PAIN MEDICINE

## 2025-05-20 PROCEDURE — 3074F SYST BP LT 130 MM HG: CPT | Performed by: PAIN MEDICINE

## 2025-05-20 PROCEDURE — 1159F MED LIST DOCD IN RCRD: CPT | Performed by: PAIN MEDICINE

## 2025-05-20 PROCEDURE — 1160F RVW MEDS BY RX/DR IN RCRD: CPT | Performed by: PAIN MEDICINE

## 2025-05-20 PROCEDURE — 99214 OFFICE O/P EST MOD 30 MIN: CPT | Performed by: PAIN MEDICINE

## 2025-05-20 RX ORDER — SODIUM CHLORIDE 9 MG/ML
INJECTION, SOLUTION INTRAVENOUS PRN
OUTPATIENT
Start: 2025-05-20

## 2025-05-20 RX ORDER — SODIUM CHLORIDE 0.9 % (FLUSH) 0.9 %
5-40 SYRINGE (ML) INJECTION EVERY 12 HOURS SCHEDULED
OUTPATIENT
Start: 2025-05-20

## 2025-05-20 RX ORDER — SODIUM CHLORIDE 0.9 % (FLUSH) 0.9 %
5-40 SYRINGE (ML) INJECTION PRN
OUTPATIENT
Start: 2025-05-20

## 2025-05-20 NOTE — PROGRESS NOTES
[facility]  [address], [place] [address]  [phone]    Follow up Note      Kathi LAMA Clara     Date of Visit:  5/20/2025    CC:  Patient presents for follow up   Chief Complaint   Patient presents with    Follow-up     Repeat bilateral Lumbar 3,4,5 medial branch block.      HPI:  Follow upon her low back pain with radiation to bilateral lower extremities an no acute issues.  Appropriate analgesia with current medications regimen: N/A.    Change in quality of symptoms:no.    Medication side effects:not applicable .   Recent diagnostic testing:none.   Recent interventional procedures:bilateral L3,4,5 MBB with more than 80% improvement in her pain for more than one week    She has been on anticoagulation medications to include ASA. The patient  has not been on herbal supplements.  The patient is not diabetic.     Imaging:   Lumbar spine MRI 2020:  Multilevel degenerative disc disease and multilevel degenerative facet  hypertrophy with canal stenosis at L3-4.     Previous treatments: Physical Therapy, Epidural Steroid Injection, and medications..         Potential Aberrant Drug-Related Behavior:    No    Urine Drug Screening:  None    OARRS report:  05/2025 consistent     Opioid Agreement:  Renewal date:N/A    Past Medical History:   Diagnosis Date    Arthritis     shoulders, hips and knees, fingers    Bronchitis     CAD (coronary artery disease)     some blockage, being treated with aspirin; Dr. Bruce last seen 1/3/22    GERD (gastroesophageal reflux disease)     Hyperlipidemia     Hypertension     Sinus infection 06/22/2022    LD of doxycline- 7/2/22     Past Surgical History:   Procedure Laterality Date    APPENDECTOMY      BLADDER SURGERY      BLADDER SUSPENSION      CARDIAC SURGERY      CHOLECYSTECTOMY      COLONOSCOPY  11/29/2012    diverticulosis, hemorrhoid    COLONOSCOPY  09/2015    CORONARY ANGIOPLASTY  10/11/2019    Angioplasty LAD by Dr Bruce

## 2025-05-20 NOTE — PROGRESS NOTES
Kathi Elizabeth presents to the [facility] on 5/20/2025. Kathi is complaining of pain in her lower back radiating to BLE. The pain is constant. The pain is described as aching. Pain is rated on her best day at a 3, on her worst day at a 7, and on average at a 5 on the VAS scale. She took her last dose of Motrin and Tylenol today.      Any procedures since your last visit: Yes, with 80 % relief.    She is  on NSAIDS and  is  on anticoagulation medications to include ASA and is managed by Dr. Bruce.     Pacemaker or defibrillator: No     Medication Contract and Consent for Opioid Use Documents Filed        No documents found                       Resp 16   Ht 1.626 m (5' 4.02\")   Wt 98.4 kg (217 lb)   BMI 37.23 kg/m²      No LMP recorded. Patient has had a hysterectomy.

## 2025-06-06 ENCOUNTER — TELEPHONE (OUTPATIENT)
Age: 85
End: 2025-06-06

## 2025-06-06 NOTE — TELEPHONE ENCOUNTER
Call to Kathi Elizabeth that procedure was scheduled for 06/16/2025 and that [facility] should call her a few days before for the pre op call and after 3:00 PM the business day before with the arrival time. Instructed to call office back if any questions. Kathi verbalized understanding.    Electronically signed by Amanda Muse RN on 6/6/2025 at 2:26 PM

## 2025-06-10 ENCOUNTER — OFFICE VISIT (OUTPATIENT)
Dept: PRIMARY CARE CLINIC | Age: 85
End: 2025-06-10
Payer: MEDICARE

## 2025-06-10 VITALS
DIASTOLIC BLOOD PRESSURE: 76 MMHG | OXYGEN SATURATION: 99 % | BODY MASS INDEX: 38.17 KG/M2 | SYSTOLIC BLOOD PRESSURE: 128 MMHG | TEMPERATURE: 97.7 F | HEART RATE: 59 BPM | WEIGHT: 223.6 LBS | HEIGHT: 64 IN

## 2025-06-10 DIAGNOSIS — I10 ESSENTIAL HYPERTENSION: ICD-10-CM

## 2025-06-10 DIAGNOSIS — E78.5 HYPERLIPIDEMIA LDL GOAL <100: ICD-10-CM

## 2025-06-10 DIAGNOSIS — I25.728 CORONARY ARTERY DISEASE OF AUTOLOGOUS BYPASS GRAFT WITH STABLE ANGINA PECTORIS: ICD-10-CM

## 2025-06-10 DIAGNOSIS — E66.01 MORBID (SEVERE) OBESITY DUE TO EXCESS CALORIES (HCC): ICD-10-CM

## 2025-06-10 DIAGNOSIS — I50.31 DIASTOLIC CHF, ACUTE (HCC): Primary | ICD-10-CM

## 2025-06-10 DIAGNOSIS — J31.0 CHRONIC RHINITIS: ICD-10-CM

## 2025-06-10 DIAGNOSIS — R73.03 BORDERLINE DIABETIC: ICD-10-CM

## 2025-06-10 LAB — HBA1C MFR BLD: 6 %

## 2025-06-10 PROCEDURE — 99213 OFFICE O/P EST LOW 20 MIN: CPT | Performed by: INTERNAL MEDICINE

## 2025-06-10 PROCEDURE — 1123F ACP DISCUSS/DSCN MKR DOCD: CPT | Performed by: INTERNAL MEDICINE

## 2025-06-10 PROCEDURE — 1160F RVW MEDS BY RX/DR IN RCRD: CPT | Performed by: INTERNAL MEDICINE

## 2025-06-10 PROCEDURE — 3078F DIAST BP <80 MM HG: CPT | Performed by: INTERNAL MEDICINE

## 2025-06-10 PROCEDURE — 83036 HEMOGLOBIN GLYCOSYLATED A1C: CPT | Performed by: INTERNAL MEDICINE

## 2025-06-10 PROCEDURE — 3074F SYST BP LT 130 MM HG: CPT | Performed by: INTERNAL MEDICINE

## 2025-06-10 PROCEDURE — 1159F MED LIST DOCD IN RCRD: CPT | Performed by: INTERNAL MEDICINE

## 2025-06-10 RX ORDER — LORATADINE 10 MG/1
10 TABLET ORAL DAILY
Qty: 90 TABLET | Refills: 0 | Status: SHIPPED | OUTPATIENT
Start: 2025-06-10

## 2025-06-10 NOTE — PROGRESS NOTES
Chief Complaint   Patient presents with    Coronary Artery Disease     3 mth check up with no complaints today, medication refills       HPI:  Patient is here for follow-up.  Patient feels well occasional constipation, she ambulates with a cane  Denied cardiopulmonary symptoms compliant on medications no recent lab work  Patient requested refills for Claritin 10 mg tablet daily for chronic rhinitis  Today hemoglobin A1c 6.0 no change since 2020      Past Medical History, Surgical History, and Family History has been reviewed and updated.    Review of Systems:  Constitutional:  No fever, no fatigue, no chills, no headaches, no weight change  Dermatology:  No rash, no mole, no dry or sensitive skin  ENT:  No cough, no sore throat, no sinus pain, no runny nose, no ear pain  Cardiology:  No chest pain, no palpitations, no leg edema, no shortness of breath, no PND  Gastroenterology:  No dysphagia, no abdominal pain, no nausea, no vomiting, no constipation, no diarrhea, no heartburn  Musculoskeletal:  No joint pain, no leg cramps, no back pain, no muscle aches  Respiratory:  No shortness of breath, no orthopnea, no wheezing, no PINTO, no hemoptysis  Urology:  No blood in the urine, no urinary frequency, no urinary incontinence, no urinary urgency, no nocturia, no dysuria    Vitals:    06/10/25 0937   BP: 128/76   BP Site: Right Upper Arm   Patient Position: Sitting   BP Cuff Size: Medium Adult   Pulse: 59   Temp: 97.7 °F (36.5 °C)   TempSrc: Temporal   SpO2: 99%   Weight: 101.4 kg (223 lb 9.6 oz)   Height: 1.626 m (5' 4.02\")       General:  Patient alert and oriented x 3, NAD, pleasant  HEENT:  Atraumatic, normocephalic, PERRLA, EOMI, clear conjunctiva, TMs clear, nose-clear, throat - no erythema  Neck:  Supple, no goiter, no carotid bruits, no LAD  Lungs:  CTA + midsternal scar  Heart:  RRR, + systolic murmur unchanged,no gallops or rubs  Abdomen:  Soft/nt/nd, + bowel sounds  Lymph node examination:

## 2025-06-13 ENCOUNTER — ANESTHESIA EVENT (OUTPATIENT)
Dept: OPERATING ROOM | Age: 85
End: 2025-06-13
Payer: MEDICARE

## 2025-06-13 NOTE — ANESTHESIA PRE PROCEDURE
Department of Anesthesiology  Preprocedure Note       Name:  Kathi Elizabeth   Age:  84 y.o.  :  1940                                          MRN:  [id number]         Date:  2025      Surgeon: Surgeon(s):  Julian Story MD    Procedure: Procedure(s):  Bilateral Lumbar 3,4,5 medial branch radiofrequency ablation. SEDATION    Medications prior to admission:   Prior to Admission medications    Medication Sig Start Date End Date Taking? Authorizing Provider   loratadine (CLARITIN) 10 MG tablet Take 1 tablet by mouth daily 6/10/25   Homer Padgett MD   REPATHA SURECLICK 140 MG/ML SOAJ INJECT 1 ML SUBCUTANEOUSLY EVERY 2 WEEKS 25   Jaimie Bruce MD   furosemide (LASIX) 20 MG tablet Take 1 tablet by mouth daily 3/6/25   Homer Padgett MD   isosorbide mononitrate (IMDUR) 30 MG extended release tablet Take 1 tablet by mouth daily am 3/6/25   Homer Padgett MD   metoprolol succinate (TOPROL XL) 50 MG extended release tablet Take 1 tablet by mouth 2 times daily 3/6/25   Homer Padgett MD   montelukast (SINGULAIR) 10 MG tablet Take 1 tablet by mouth daily 3/6/25   Homer Padgett MD   pantoprazole (PROTONIX) 40 MG tablet Take 1 tablet by mouth daily 3/6/25   Homer Padgett MD   diclofenac sodium (VOLTAREN) 1 % GEL APPLY 4 GRAMS TO THE AFFECTED AREA(S) BY TOPICAL ROUTE 2 TIMES PER DAY    Keysha Gilbert MD   ibuprofen (ADVIL;MOTRIN) 200 MG tablet 1 tab bid 23   Homer Padgett MD   aspirin 325 MG tablet Take 1 tablet by mouth daily    Keysha Gilbert MD   acetaminophen (TYLENOL) 650 MG extended release tablet Take 1 tablet by mouth 2 times daily as needed    Keysha Gilbert MD   mometasone (NASONEX) 50 MCG/ACT nasal spray 1 spray by Each Nostril route as needed    Keysha Gilbert MD   Albuterol Sulfate (PROAIR HFA IN) Inhale 2 puffs into the lungs 4 times daily as needed    Keysha Gilbert MD   nitroGLYCERIN (NITROSTAT) 0.4 MG SL tablet Place 1 tablet under the

## 2025-06-16 ENCOUNTER — HOSPITAL ENCOUNTER (OUTPATIENT)
Age: 85
Setting detail: OUTPATIENT SURGERY
Discharge: HOME | End: 2025-06-16
Attending: PAIN MEDICINE | Admitting: PAIN MEDICINE
Payer: MEDICARE

## 2025-06-16 ENCOUNTER — ANESTHESIA (OUTPATIENT)
Dept: OPERATING ROOM | Age: 85
End: 2025-06-16
Payer: MEDICARE

## 2025-06-16 ENCOUNTER — HOSPITAL ENCOUNTER (OUTPATIENT)
Dept: OPERATING ROOM | Age: 85
Setting detail: OUTPATIENT SURGERY
Discharge: HOME | End: 2025-06-16
Attending: PAIN MEDICINE
Payer: MEDICARE

## 2025-06-16 VITALS
RESPIRATION RATE: 16 BRPM | OXYGEN SATURATION: 94 % | HEIGHT: 64 IN | HEART RATE: 60 BPM | BODY MASS INDEX: 37.73 KG/M2 | WEIGHT: 221 LBS | DIASTOLIC BLOOD PRESSURE: 67 MMHG | TEMPERATURE: 98 F | SYSTOLIC BLOOD PRESSURE: 152 MMHG

## 2025-06-16 DIAGNOSIS — M47.816 LUMBAR SPONDYLOSIS: ICD-10-CM

## 2025-06-16 PROCEDURE — 6360000002 HC RX W HCPCS: Performed by: PAIN MEDICINE

## 2025-06-16 PROCEDURE — 2580000003 HC RX 258: Performed by: ANESTHESIOLOGY

## 2025-06-16 PROCEDURE — 64636 DESTROY L/S FACET JNT ADDL: CPT | Performed by: PAIN MEDICINE

## 2025-06-16 PROCEDURE — 3600000005 HC SURGERY LEVEL 5 BASE: Performed by: PAIN MEDICINE

## 2025-06-16 PROCEDURE — 3700000000 HC ANESTHESIA ATTENDED CARE: Performed by: PAIN MEDICINE

## 2025-06-16 PROCEDURE — 7100000010 HC PHASE II RECOVERY - FIRST 15 MIN: Performed by: PAIN MEDICINE

## 2025-06-16 PROCEDURE — 2709999900 HC NON-CHARGEABLE SUPPLY: Performed by: PAIN MEDICINE

## 2025-06-16 PROCEDURE — 3600000015 HC SURGERY LEVEL 5 ADDTL 15MIN: Performed by: PAIN MEDICINE

## 2025-06-16 PROCEDURE — 3700000001 HC ADD 15 MINUTES (ANESTHESIA): Performed by: PAIN MEDICINE

## 2025-06-16 PROCEDURE — 6360000002 HC RX W HCPCS: Performed by: NURSE ANESTHETIST, CERTIFIED REGISTERED

## 2025-06-16 PROCEDURE — 64635 DESTROY LUMB/SAC FACET JNT: CPT | Performed by: PAIN MEDICINE

## 2025-06-16 PROCEDURE — 7100000011 HC PHASE II RECOVERY - ADDTL 15 MIN: Performed by: PAIN MEDICINE

## 2025-06-16 RX ORDER — SODIUM CHLORIDE 9 MG/ML
INJECTION, SOLUTION INTRAVENOUS PRN
Status: DISCONTINUED | OUTPATIENT
Start: 2025-06-16 | End: 2025-06-16 | Stop reason: HOSPADM

## 2025-06-16 RX ORDER — LIDOCAINE HYDROCHLORIDE 20 MG/ML
INJECTION, SOLUTION EPIDURAL; INFILTRATION; INTRACAUDAL; PERINEURAL PRN
Status: DISCONTINUED | OUTPATIENT
Start: 2025-06-16 | End: 2025-06-16 | Stop reason: ALTCHOICE

## 2025-06-16 RX ORDER — SODIUM CHLORIDE, SODIUM LACTATE, POTASSIUM CHLORIDE, CALCIUM CHLORIDE 600; 310; 30; 20 MG/100ML; MG/100ML; MG/100ML; MG/100ML
INJECTION, SOLUTION INTRAVENOUS CONTINUOUS
Status: DISCONTINUED | OUTPATIENT
Start: 2025-06-16 | End: 2025-06-16 | Stop reason: HOSPADM

## 2025-06-16 RX ORDER — PROPOFOL 10 MG/ML
INJECTION, EMULSION INTRAVENOUS
Status: DISCONTINUED | OUTPATIENT
Start: 2025-06-16 | End: 2025-06-16 | Stop reason: SDUPTHER

## 2025-06-16 RX ORDER — FENTANYL CITRATE 50 UG/ML
INJECTION, SOLUTION INTRAMUSCULAR; INTRAVENOUS
Status: DISCONTINUED | OUTPATIENT
Start: 2025-06-16 | End: 2025-06-16 | Stop reason: SDUPTHER

## 2025-06-16 RX ORDER — SODIUM CHLORIDE 0.9 % (FLUSH) 0.9 %
5-40 SYRINGE (ML) INJECTION EVERY 12 HOURS SCHEDULED
Status: DISCONTINUED | OUTPATIENT
Start: 2025-06-16 | End: 2025-06-16 | Stop reason: HOSPADM

## 2025-06-16 RX ORDER — SODIUM CHLORIDE 0.9 % (FLUSH) 0.9 %
5-40 SYRINGE (ML) INJECTION PRN
Status: DISCONTINUED | OUTPATIENT
Start: 2025-06-16 | End: 2025-06-16 | Stop reason: HOSPADM

## 2025-06-16 RX ADMIN — FENTANYL CITRATE 50 MCG: 50 INJECTION, SOLUTION INTRAMUSCULAR; INTRAVENOUS at 11:02

## 2025-06-16 RX ADMIN — FENTANYL CITRATE 50 MCG: 50 INJECTION, SOLUTION INTRAMUSCULAR; INTRAVENOUS at 11:00

## 2025-06-16 RX ADMIN — SODIUM CHLORIDE, POTASSIUM CHLORIDE, SODIUM LACTATE AND CALCIUM CHLORIDE: 600; 310; 30; 20 INJECTION, SOLUTION INTRAVENOUS at 10:16

## 2025-06-16 RX ADMIN — PROPOFOL 10 MG: 10 INJECTION, EMULSION INTRAVENOUS at 11:13

## 2025-06-16 ASSESSMENT — PAIN - FUNCTIONAL ASSESSMENT
PAIN_FUNCTIONAL_ASSESSMENT: 0-10
PAIN_FUNCTIONAL_ASSESSMENT: NONE - DENIES PAIN
PAIN_FUNCTIONAL_ASSESSMENT: NONE - DENIES PAIN
PAIN_FUNCTIONAL_ASSESSMENT: 0-10

## 2025-06-16 ASSESSMENT — PAIN DESCRIPTION - DESCRIPTORS: DESCRIPTORS: ACHING;DISCOMFORT

## 2025-06-16 NOTE — H&P
[facility]  [address], [place] [address]  [phone]    Procedure History & Physical      Kathi Elizabeth     HPI:    Patient  is here for axial low back pain for bilateral L3,4,5 MB RFA  Labs/imaging studies reviewed   All question and concerns addressed including R/B/A associated with the procedure    Past Medical History:   Diagnosis Date    Arthritis     shoulders, hips and knees, fingers    Bronchitis     CAD (coronary artery disease)     some blockage, being treated with aspirin; Dr. Bruce last seen 5/2025    GERD (gastroesophageal reflux disease)     Hyperlipidemia     Hypertension     Sinus infection 06/22/2022    LD of doxycline- 7/2/22       Past Surgical History:   Procedure Laterality Date    APPENDECTOMY      BLADDER SURGERY      BLADDER SUSPENSION      CARDIAC SURGERY      CHOLECYSTECTOMY      COLONOSCOPY  11/29/2012    diverticulosis, hemorrhoid    COLONOSCOPY  09/2015    CORONARY ANGIOPLASTY  10/11/2019    Angioplasty LAD by Dr Bruce     CORONARY ARTERY BYPASS GRAFT  06/17/2019    Bypass x4.  [facility].  LIMB-LAD, SVG-OM1 SVG-OM2 SVG-RCA; Dr. Bruce- last seen 1/3/22    CYST REMOVAL      back    DILATION AND CURETTAGE OF UTERUS      HYSTERECTOMY (CERVIX STATUS UNKNOWN)      INTRACAPSULAR CATARACT EXTRACTION Left 07/13/2022    CATARACT EXTRACTION WITH INTRAOCULAR LENS IMPLANT LEFT EYE performed by Vernon Ventura DO at [facility] OR    PAIN MANAGEMENT PROCEDURE N/A 03/18/2024    Lumbar epidural steroid injection L3-4 with low volume. performed by Julian Story MD at [facility] OR    PAIN MANAGEMENT PROCEDURE N/A 8/26/2024    Lumbar epidural steroid injection L3-4 with low volume.SEDATION performed by Julian Story MD at [facility] OR    PAIN MANAGEMENT PROCEDURE Bilateral 2/3/2025    Bilateral Lumbar 3,4,5 medial branch block. performed by Julian Story MD at [facility] OR    PAIN MANAGEMENT PROCEDURE Bilateral 5/1/2025    Repeat

## 2025-06-16 NOTE — ANESTHESIA POSTPROCEDURE EVALUATION
Department of Anesthesiology  Postprocedure Note    Patient: Kathi Elizabeth  MRN: [id number]  YOB: 1940  Date of evaluation: 6/16/2025    Procedure Summary       Date: 06/16/25 Room / Location: [address]    Anesthesia Start: 1100 Anesthesia Stop: 1129    Procedure: Bilateral Lumbar 3,4,5 medial branch radiofrequency ablation. SEDATION (Bilateral) Diagnosis:       Lumbar spondylosis      (Lumbar spondylosis [M47.816])    Surgeons: Julian Story MD Responsible Provider: Zaki Dela Cruz MD    Anesthesia Type: MAC ASA Status: 3            Anesthesia Type: MAC    Theresa Phase I: Theresa Score: 10    Theresa Phase II: Theresa Score: 10    Anesthesia Post Evaluation    Patient location during evaluation: PACU  Patient participation: complete - patient participated  Level of consciousness: awake and alert  Airway patency: patent  Nausea & Vomiting: no nausea and no vomiting  Cardiovascular status: hemodynamically stable  Respiratory status: acceptable  Hydration status: euvolemic  Multimodal analgesia pain management approach  Pain management: adequate    No notable events documented.

## 2025-06-16 NOTE — DISCHARGE INSTRUCTIONS
[facility]  [phone]   Post-Pain Block/ Radiofrequency Home Going Instructions    1-Go home, rest for the remainder of the day  2-Please do not lift over 20 pounds the day of the injection  3-If you received sedation No: alcohol, driving, operating lawn mowers, plows, tractors or other dangerous equipment until next morning. Do not make important decisions or sign legal documents for 24 hours. You may experience light headedness, dizziness, nausea or sleepiness after sedation. Do not stay alone. A responsible adult must be with you for 24 hours. You could be nauseated from the medications you have received. Your IV site may be sore and bruised.    4-No dietary restrictions     5-Resume all medications the same day, blood thinners to be resumed 24 hours after injection    6-Keep the surgical site clean and dry, you may shower the next morning and remove the      dressing.     7- No sitz baths, tub baths or hot tubs/swimming for 24 hours.       8- If you have any pain at the injection site(s), application of an ice pack to the area should be       helpful, 20 minutes on/20 minutes off for next 48 hours.  9- Call [facility] pain management immediately at if you develop.  Fever greater than 100.4 F  Have bleeding or drainage from the puncture site  Have progressive Leg/arm numbness and or weakness  Loss of control of bowel and or bladder (wet/soil yourself)  Severe headache with inability to lift head  10-You may return to work the next day     Infection After Surgery: Care Instructions  Overview  After surgery, an infection is always possible. It doesn't mean that the surgery didn't go well.  Because an infection can be serious, your doctor has taken steps to manage it.  Your doctor checked the infection and cleaned it if necessary. Your doctor may have made an opening in the area so that the pus can drain out. You may have gauze in the cut so that the area will stay open and keep draining.

## 2025-06-16 NOTE — OP NOTE
2025    Patient: Kathi Elizabeth  :  1940  Age:  84 y.o.  Sex:  female     PRE-OPERATIVE DIAGNOSIS: Bilateral Lumbar spondylosis, lumbar facet arthropathy.    POST-OPERATIVE DIAGNOSIS: Same.    PROCEDURE:  Fluoroscopic-guided Bilateral  Lumbar facet medial branch radiofrequency ablation at levels L3,4,5 MB.    SURGEON:  WILBUR David    ANESTHESIA: MAC    ESTIMATED BLOOD LOSS: None.  ______________________________________________________________________  HISTORY & PHYSICAL: Kathi Elizabeth presents today for fluoroscopic-guided Bilateral lumbar facet medial branch radiofrequency ablation at levels L3,4,5 MB. The potential complications of the procedure were explained to Kathi again today and she has elected to undergo the aforementioned procedure.    Kathi’s complete History & Physical examination were reviewed in depth, a copy of which is in the chart.      DESCRIPTION OF PROCEDURE:    After confirming written and informed consent, a time-out was performed and Kathi’s name and date of birth, the procedure to be performed as well as the plan for the location of the needle insertion were confirmed.    The patient was brought into the procedure room and placed in the prone position on the fluoroscopy table. Standard monitors were placed and vital signs were observed throughout the procedure. The area of the lumbar spine and upper buttocks was sterilely prepped with chloraprep and draped in a sterile manner. AP fluoroscopy was used to identify and jono bartons point at the targeted area. A 22 gauge 10 mm radiofrequency probe was advanced toward each of these points. Once bone was contacted, negative aspiration for blood and CSF was confirmed, sensory stimulation was performed at 50 Hz and at 0.4 volts generating a pressure sensation. Motor stimulation < 2.0 volts elicited multifidus twitching without any radicular symptoms. 1 ml of 2% lidocaine was injected prior to lesioning, which was performed for 90

## 2025-07-18 ENCOUNTER — OFFICE VISIT (OUTPATIENT)
Age: 85
End: 2025-07-18
Payer: MEDICARE

## 2025-07-18 VITALS
HEART RATE: 65 BPM | WEIGHT: 221 LBS | DIASTOLIC BLOOD PRESSURE: 66 MMHG | TEMPERATURE: 97.5 F | HEIGHT: 64 IN | OXYGEN SATURATION: 96 % | SYSTOLIC BLOOD PRESSURE: 138 MMHG | RESPIRATION RATE: 18 BRPM | BODY MASS INDEX: 37.73 KG/M2

## 2025-07-18 DIAGNOSIS — M51.9 LUMBAR DISC DISORDER: ICD-10-CM

## 2025-07-18 DIAGNOSIS — M54.16 LUMBAR RADICULOPATHY: ICD-10-CM

## 2025-07-18 DIAGNOSIS — M47.816 LUMBAR FACET ARTHROPATHY: ICD-10-CM

## 2025-07-18 DIAGNOSIS — G89.4 CHRONIC PAIN SYNDROME: Primary | ICD-10-CM

## 2025-07-18 DIAGNOSIS — M48.062 SPINAL STENOSIS OF LUMBAR REGION WITH NEUROGENIC CLAUDICATION: ICD-10-CM

## 2025-07-18 DIAGNOSIS — M47.816 LUMBAR SPONDYLOSIS: ICD-10-CM

## 2025-07-18 PROCEDURE — 1123F ACP DISCUSS/DSCN MKR DOCD: CPT | Performed by: PAIN MEDICINE

## 2025-07-18 PROCEDURE — 99213 OFFICE O/P EST LOW 20 MIN: CPT | Performed by: PAIN MEDICINE

## 2025-07-18 PROCEDURE — 1160F RVW MEDS BY RX/DR IN RCRD: CPT | Performed by: PAIN MEDICINE

## 2025-07-18 PROCEDURE — 1159F MED LIST DOCD IN RCRD: CPT | Performed by: PAIN MEDICINE

## 2025-07-18 PROCEDURE — 3078F DIAST BP <80 MM HG: CPT | Performed by: PAIN MEDICINE

## 2025-07-18 PROCEDURE — 3075F SYST BP GE 130 - 139MM HG: CPT | Performed by: PAIN MEDICINE

## 2025-07-18 NOTE — PROGRESS NOTES
[facility]  [address], [place] [address]  [phone]    Follow up Note      Kathi C Clara     Date of Visit:  7/18/2025    CC:  Patient presents for follow up   Chief Complaint   Patient presents with    Follow-up     Bilateral Lumbar 3,4,5 medial branch radiofrequency ablation.- 6/16/2025     HPI:  Follow upon her low back pain with radiation to bilateral lower extremities an no acute issues.  Appropriate analgesia with current medications regimen: N/A.    Change in quality of symptoms:no.    Medication side effects:not applicable .   Recent diagnostic testing:none.   Recent interventional procedures:bilateral L3,4,5 MB RFA with 20% improvement in her pain    She has been on anticoagulation medications to include ASA. The patient  has not been on herbal supplements.  The patient is not diabetic.     Imaging:   Lumbar spine MRI 2020:  Multilevel degenerative disc disease and multilevel degenerative facet  hypertrophy with canal stenosis at L3-4.     Previous treatments: Physical Therapy, Epidural Steroid Injection, and medications..         Potential Aberrant Drug-Related Behavior:    No    Urine Drug Screening:  None    OARRS report:  07/2025 consistent     Opioid Agreement:  Renewal date:N/A    Past Medical History:   Diagnosis Date    Arthritis     shoulders, hips and knees, fingers    Bronchitis     CAD (coronary artery disease)     some blockage, being treated with aspirin; Dr. Bruce last seen 5/2025    GERD (gastroesophageal reflux disease)     Hyperlipidemia     Hypertension     Sinus infection 06/22/2022    LD of doxycline- 7/2/22     Past Surgical History:   Procedure Laterality Date    APPENDECTOMY      BLADDER SURGERY      BLADDER SUSPENSION      CARDIAC SURGERY      CHOLECYSTECTOMY      COLONOSCOPY  11/29/2012    diverticulosis, hemorrhoid    COLONOSCOPY  09/2015    CORONARY ANGIOPLASTY  10/11/2019    Angioplasty LAD by Dr Bruce     CORONARY

## 2025-07-18 NOTE — PROGRESS NOTES
Kathi Elizabeth presents to the [facility] on 7/18/2025. Kathi is complaining of pain in her lower back radiating to BLE . The pain is constant states that the morning is the worst. The pain is described as aching. Pain is rated on her best day at a 5, on her worst day at a 7, and on average at a 5 on the VAS scale. She took her last dose of tylenol and ibuprofen this AM .      Any procedures since your last visit: Yes    She is  on NSAIDS and  is  on anticoagulation medications to include ASA and is managed by Dr. Bruce.     Pacemaker or defibrillator: No       Medication Contract and Consent for Opioid Use Documents Filed        No documents found                       /66   Pulse 65   Temp 97.5 °F (36.4 °C)   Resp 18   Ht 1.626 m (5' 4\")   Wt 100.2 kg (221 lb)   SpO2 96%   BMI 37.93 kg/m²      No LMP recorded. Patient has had a hysterectomy.

## 2025-07-23 ENCOUNTER — OFFICE VISIT (OUTPATIENT)
Dept: ORTHOPEDIC SURGERY | Age: 85
End: 2025-07-23
Payer: MEDICARE

## 2025-07-23 VITALS — HEIGHT: 64 IN | WEIGHT: 221 LBS | BODY MASS INDEX: 37.73 KG/M2

## 2025-07-23 DIAGNOSIS — M17.11 PRIMARY OSTEOARTHRITIS OF RIGHT KNEE: Primary | ICD-10-CM

## 2025-07-23 DIAGNOSIS — M17.12 ARTHRITIS OF LEFT KNEE: ICD-10-CM

## 2025-07-23 PROCEDURE — 99213 OFFICE O/P EST LOW 20 MIN: CPT | Performed by: ORTHOPAEDIC SURGERY

## 2025-07-23 PROCEDURE — 1160F RVW MEDS BY RX/DR IN RCRD: CPT | Performed by: ORTHOPAEDIC SURGERY

## 2025-07-23 PROCEDURE — 1125F AMNT PAIN NOTED PAIN PRSNT: CPT | Performed by: ORTHOPAEDIC SURGERY

## 2025-07-23 PROCEDURE — 1123F ACP DISCUSS/DSCN MKR DOCD: CPT | Performed by: ORTHOPAEDIC SURGERY

## 2025-07-23 PROCEDURE — 1159F MED LIST DOCD IN RCRD: CPT | Performed by: ORTHOPAEDIC SURGERY

## 2025-07-23 NOTE — PROGRESS NOTES
Chief Complaint:   Chief Complaint   Patient presents with    Knee Pain     Bilateral Knee F/U, R>L       Kathi Elizabeth sees us today about her knees.  She has been here before for injections.  She did have viscosupplement injections of both knees and this was sometime ago.  They are bothering her more.  She is not interested in aggressive or surgical treatment at this time.  She does use an ambulatory aid.      Allergies; medications; past medical, surgical, family, and social history; and problem list have been reviewed today and updated as indicated in this encounter seen below.    Exam: Range of motion of the right knee is about 10 to 105 degrees.  There is slight pliability at the endpoints but discomfort in this area was not explored.  The left knee is 5 to 110 degrees.  There is some crepitus throughout the range of motion of both.  There is medial gapping of both knees with the right being worse than the left.  Is difficult to assess effusion because of her body habitus.  There is no hyperemia or erythema.    Radiographs: Old imaging from 4/21/2022 was reviewed.  The left knee was seen in varus malalignment with narrowing of the medial joint space and hypertrophic marginal changes medial femoral-tibial condyles.  The right knee is in varus malalignment with medial bone-on-bone contact and hypertrophic marginal changes medial and patellofemoral most.    ASSESSMENT:    Kahti was seen today for knee pain.    Diagnoses and all orders for this visit:    Primary osteoarthritis of right knee        PLAN: We discussed the symptoms as well as physical findings.  But he would like to have injections in her knee.  We will seek approval once again for Visco supplement injections of both knees.    No follow-ups on file.       Current Outpatient Medications   Medication Sig Dispense Refill    loratadine (CLARITIN) 10 MG tablet Take 1 tablet by mouth daily 90 tablet 0    REPATHA SURECLICK 140 MG/ML SOAJ INJECT 1 ML

## 2025-08-08 ENCOUNTER — OFFICE VISIT (OUTPATIENT)
Dept: ORTHOPEDIC SURGERY | Age: 85
End: 2025-08-08

## 2025-08-08 DIAGNOSIS — M17.11 PRIMARY OSTEOARTHRITIS OF RIGHT KNEE: Primary | ICD-10-CM

## 2025-08-08 DIAGNOSIS — M17.12 ARTHRITIS OF LEFT KNEE: ICD-10-CM

## 2025-08-15 ENCOUNTER — OFFICE VISIT (OUTPATIENT)
Dept: ORTHOPEDIC SURGERY | Age: 85
End: 2025-08-15

## 2025-08-15 VITALS — WEIGHT: 221 LBS | BODY MASS INDEX: 37.73 KG/M2 | HEIGHT: 64 IN

## 2025-08-15 DIAGNOSIS — M17.11 PRIMARY OSTEOARTHRITIS OF RIGHT KNEE: Primary | ICD-10-CM

## 2025-08-15 DIAGNOSIS — M17.12 ARTHRITIS OF LEFT KNEE: ICD-10-CM

## 2025-08-19 ENCOUNTER — SCHEDULED TELEPHONE ENCOUNTER (OUTPATIENT)
Age: 85
End: 2025-08-19
Payer: MEDICARE

## 2025-08-19 DIAGNOSIS — M47.816 LUMBAR FACET ARTHROPATHY: ICD-10-CM

## 2025-08-19 DIAGNOSIS — G89.4 CHRONIC PAIN SYNDROME: Primary | ICD-10-CM

## 2025-08-19 DIAGNOSIS — M48.062 SPINAL STENOSIS OF LUMBAR REGION WITH NEUROGENIC CLAUDICATION: ICD-10-CM

## 2025-08-19 DIAGNOSIS — M47.816 LUMBAR SPONDYLOSIS: ICD-10-CM

## 2025-08-19 DIAGNOSIS — M54.16 LUMBAR RADICULOPATHY: ICD-10-CM

## 2025-08-19 DIAGNOSIS — M51.9 LUMBAR DISC DISORDER: ICD-10-CM

## 2025-08-19 PROCEDURE — 99211 OFF/OP EST MAY X REQ PHY/QHP: CPT | Performed by: PAIN MEDICINE

## 2025-08-22 ENCOUNTER — OFFICE VISIT (OUTPATIENT)
Dept: ORTHOPEDIC SURGERY | Age: 85
End: 2025-08-22

## 2025-08-22 DIAGNOSIS — M17.11 PRIMARY OSTEOARTHRITIS OF RIGHT KNEE: Primary | ICD-10-CM

## 2025-08-22 DIAGNOSIS — M17.12 ARTHRITIS OF LEFT KNEE: ICD-10-CM

## 2025-09-02 LAB
BACTERIA: ABNORMAL
BILIRUBIN, URINE: NEGATIVE
CHOLESTEROL, TOTAL: 136 MG/DL
COLOR, UA: YELLOW
EPITHELIAL CELLS, UA: ABNORMAL /HPF
GLUCOSE URINE: NEGATIVE MG/DL
HDLC SERPL-MCNC: 50 MG/DL
KETONES, URINE: NEGATIVE MG/DL
LDL CHOLESTEROL: 59 MG/DL
LEUKOCYTE ESTERASE, URINE: NEGATIVE
NITRITE, URINE: NEGATIVE
PH, URINE: 6.5 (ref 5–8)
PROTEIN UA: NEGATIVE MG/DL
RBC UA: ABNORMAL /HPF
SPECIFIC GRAVITY UA: 1.01 (ref 1–1.03)
TRIGL SERPL-MCNC: 138 MG/DL
TURBIDITY: ABNORMAL
URINE HGB: ABNORMAL
UROBILINOGEN, URINE: 0.2 EU/DL (ref 0–1)
VLDLC SERPL CALC-MCNC: 28 MG/DL
WBC UA: ABNORMAL /HPF
YEAST: PRESENT

## (undated) DEVICE — Device: Brand: MALYUGIN RING SYSTEM 7.0MM

## (undated) DEVICE — NEEDLE HYPO 18GA L1.5IN PNK POLYPR HUB S STL THN WALL FILL

## (undated) DEVICE — 3 ML SYRINGE LUER-LOCK TIP: Brand: MONOJECT

## (undated) DEVICE — APPLICATOR MEDICATED 26 CC SOLUTION HI LT ORNG CHLORAPREP

## (undated) DEVICE — NEEDLE SPNL 25GA L2IN BLU SHT NEO LUM PUNC DISP

## (undated) DEVICE — NEEDLE HYPO 25GA L1.5IN BLU POLYPR HUB S STL REG BVL STR

## (undated) DEVICE — CVD CANNULA

## (undated) DEVICE — SOLUTION IV IRRIG WATER 1000ML POUR BRL 2F7114

## (undated) DEVICE — PEN: MARKING STD 100/CS: Brand: MEDICAL ACTION INDUSTRIES

## (undated) DEVICE — 12 ML SYRINGE,LUER-LOCK TIP: Brand: MONOJECT

## (undated) DEVICE — NEEDLE HYPO 18GA L1.5IN PNK POLYPR HUB S STL REG BVL STR

## (undated) DEVICE — GLOVE ORANGE PI 7 1/2   MSG9075

## (undated) DEVICE — APPLICATOR MEDICATED 10.5 CC SOLUTION HI LT ORNG CHLORAPREP

## (undated) DEVICE — Device

## (undated) DEVICE — 6 ML SYRINGE LUER-LOCK TIP: Brand: MONOJECT

## (undated) DEVICE — GAUZE,SPONGE,4"X4",12PLY,STERILE,LF,2'S: Brand: MEDLINE

## (undated) DEVICE — TOWEL,OR,DSP,ST,BLUE,STD,6/PK,12PK/CS: Brand: MEDLINE

## (undated) DEVICE — BANDAGE ADH W1XL3IN NAT FAB WVN FLX DURABLE N ADH PD SEAL

## (undated) DEVICE — Device: Brand: NIPRO HYPODERMIC NEEDLE

## (undated) DEVICE — 3M™ RED DOT™ MONITORING ELECTRODE WITH FOAM TAPE AND STICKY GEL 2560, 50/BAG, 20/CASE, 72/PLT: Brand: RED DOT™

## (undated) DEVICE — NEEDLE SPNL 22GA L5IN BLK HUB S STL W/ QNCKE PNT W/OUT

## (undated) DEVICE — SURGICAL PROCEDURE PACK CATRCT LT EYE BASIC CUST ST JOS LF

## (undated) DEVICE — SOLUTION IV IRRIG 1000ML POUR BTL 2F7114

## (undated) DEVICE — EYE PAK* 1043 NON-WOVEN OPHTHALMIC DRAPE APERTURE POUCH: Brand: ALCON EYE-PAK

## (undated) DEVICE — TRAY EPI SGL DOSE 18GA NDL CUST AULTMAN HOSP

## (undated) DEVICE — 4-PORT MANIFOLD: Brand: NEPTUNE 2

## (undated) DEVICE — SYRINGE MEDICAL 3ML CLEAR PLASTIC STANDARD NON CONTROL LUERLOCK TIP DISPOSABLE

## (undated) DEVICE — STERILE POLYISOPRENE POWDER-FREE SURGICAL GLOVES: Brand: PROTEXIS